# Patient Record
Sex: FEMALE | Race: WHITE | NOT HISPANIC OR LATINO | Employment: OTHER | URBAN - METROPOLITAN AREA
[De-identification: names, ages, dates, MRNs, and addresses within clinical notes are randomized per-mention and may not be internally consistent; named-entity substitution may affect disease eponyms.]

---

## 2017-02-03 ENCOUNTER — HOSPITAL ENCOUNTER (OUTPATIENT)
Dept: GENERAL RADIOLOGY | Facility: HOSPITAL | Age: 82
Discharge: HOME | End: 2017-02-03

## 2017-02-03 ENCOUNTER — APPOINTMENT (OUTPATIENT)
Dept: PREADMISSION TESTING | Facility: HOSPITAL | Age: 82
End: 2017-02-03

## 2017-02-03 ENCOUNTER — HOSPITAL ENCOUNTER (OUTPATIENT)
Dept: GENERAL RADIOLOGY | Facility: HOSPITAL | Age: 82
Discharge: HOME | End: 2017-02-03
Admitting: ORTHOPAEDIC SURGERY

## 2017-02-03 VITALS
OXYGEN SATURATION: 98 % | HEIGHT: 64 IN | WEIGHT: 124.9 LBS | DIASTOLIC BLOOD PRESSURE: 76 MMHG | RESPIRATION RATE: 16 BRPM | SYSTOLIC BLOOD PRESSURE: 184 MMHG | BODY MASS INDEX: 21.32 KG/M2 | TEMPERATURE: 96.7 F | HEART RATE: 61 BPM

## 2017-02-03 LAB
ANION GAP SERPL CALCULATED.3IONS-SCNC: 13.3 MMOL/L
BACTERIA UR QL AUTO: ABNORMAL /HPF
BILIRUB UR QL STRIP: NEGATIVE
BUN BLD-MCNC: 24 MG/DL (ref 8–23)
BUN/CREAT SERPL: 26.7 (ref 7–25)
CALCIUM SPEC-SCNC: 9.2 MG/DL (ref 8.6–10.5)
CHLORIDE SERPL-SCNC: 97 MMOL/L (ref 98–107)
CLARITY UR: CLEAR
CO2 SERPL-SCNC: 29.7 MMOL/L (ref 22–29)
COLOR UR: YELLOW
CREAT BLD-MCNC: 0.9 MG/DL (ref 0.57–1)
DEPRECATED RDW RBC AUTO: 44.1 FL (ref 37–54)
ERYTHROCYTE [DISTWIDTH] IN BLOOD BY AUTOMATED COUNT: 13.5 % (ref 11.7–13)
GFR SERPL CREATININE-BSD FRML MDRD: 60 ML/MIN/1.73
GLUCOSE BLD-MCNC: 86 MG/DL (ref 65–99)
GLUCOSE UR STRIP-MCNC: NEGATIVE MG/DL
HCT VFR BLD AUTO: 37.7 % (ref 35.6–45.5)
HGB BLD-MCNC: 12.8 G/DL (ref 11.9–15.5)
HGB UR QL STRIP.AUTO: NEGATIVE
HYALINE CASTS UR QL AUTO: ABNORMAL /LPF
KETONES UR QL STRIP: NEGATIVE
LEUKOCYTE ESTERASE UR QL STRIP.AUTO: NEGATIVE
MCH RBC QN AUTO: 30.3 PG (ref 26.9–32)
MCHC RBC AUTO-ENTMCNC: 34 G/DL (ref 32.4–36.3)
MCV RBC AUTO: 89.1 FL (ref 80.5–98.2)
NITRITE UR QL STRIP: NEGATIVE
PH UR STRIP.AUTO: 6.5 [PH] (ref 5–8)
PLATELET # BLD AUTO: 239 10*3/MM3 (ref 140–500)
PMV BLD AUTO: 10 FL (ref 6–12)
POTASSIUM BLD-SCNC: 4 MMOL/L (ref 3.5–5.2)
PROT UR QL STRIP: NEGATIVE
RBC # BLD AUTO: 4.23 10*6/MM3 (ref 3.9–5.2)
RBC # UR: ABNORMAL /HPF
REF LAB TEST METHOD: ABNORMAL
SODIUM BLD-SCNC: 140 MMOL/L (ref 136–145)
SP GR UR STRIP: 1.02 (ref 1–1.03)
SQUAMOUS #/AREA URNS HPF: ABNORMAL /HPF
UROBILINOGEN UR QL STRIP: NORMAL
WBC NRBC COR # BLD: 9.4 10*3/MM3 (ref 4.5–10.7)
WBC UR QL AUTO: ABNORMAL /HPF

## 2017-02-03 PROCEDURE — 36415 COLL VENOUS BLD VENIPUNCTURE: CPT

## 2017-02-03 PROCEDURE — 81001 URINALYSIS AUTO W/SCOPE: CPT | Performed by: ORTHOPAEDIC SURGERY

## 2017-02-03 PROCEDURE — 87086 URINE CULTURE/COLONY COUNT: CPT | Performed by: ORTHOPAEDIC SURGERY

## 2017-02-03 PROCEDURE — 80048 BASIC METABOLIC PNL TOTAL CA: CPT | Performed by: ORTHOPAEDIC SURGERY

## 2017-02-03 PROCEDURE — 85027 COMPLETE CBC AUTOMATED: CPT | Performed by: ORTHOPAEDIC SURGERY

## 2017-02-03 PROCEDURE — 93005 ELECTROCARDIOGRAM TRACING: CPT

## 2017-02-03 PROCEDURE — 73562 X-RAY EXAM OF KNEE 3: CPT

## 2017-02-03 PROCEDURE — 93010 ELECTROCARDIOGRAM REPORT: CPT | Performed by: INTERNAL MEDICINE

## 2017-02-03 PROCEDURE — 71020 HC CHEST PA AND LATERAL: CPT

## 2017-02-03 RX ORDER — PRAMIPEXOLE DIHYDROCHLORIDE 0.5 MG/1
0.5 TABLET ORAL 3 TIMES DAILY
Status: ON HOLD | COMMUNITY
End: 2017-02-15 | Stop reason: ALTCHOICE

## 2017-02-03 RX ORDER — MELOXICAM 15 MG/1
15 TABLET ORAL DAILY
COMMUNITY
End: 2020-05-13 | Stop reason: HOSPADM

## 2017-02-03 RX ORDER — TRAMADOL HYDROCHLORIDE 50 MG/1
50 TABLET ORAL EVERY 6 HOURS PRN
Status: ON HOLD | COMMUNITY
End: 2017-02-20

## 2017-02-03 RX ORDER — ASPIRIN 81 MG/1
81 TABLET ORAL DAILY
COMMUNITY
End: 2017-02-20 | Stop reason: HOSPADM

## 2017-02-03 RX ORDER — BISOPROLOL FUMARATE AND HYDROCHLOROTHIAZIDE 5; 6.25 MG/1; MG/1
1 TABLET ORAL EVERY MORNING
COMMUNITY
End: 2020-05-13 | Stop reason: HOSPADM

## 2017-02-03 RX ORDER — CHLORHEXIDINE GLUCONATE 500 MG/1
CLOTH TOPICAL
COMMUNITY
End: 2017-02-20 | Stop reason: HOSPADM

## 2017-02-03 NOTE — DISCHARGE INSTRUCTIONS
Take the following medications the morning of surgery with a small sip of water.      BISOPROLOL. AND DO YOUR NEBULIZER TREATMENT.      OFFICE TO CALL WITH ARRIVAL TIME.    General Instructions:  • Do not eat or drink after midnight: includes water, mints, or gum. You may brush your teeth.  • Do not smoke, chew tobacco, or drink alcohol.  • Bring medications in original bottles, any inhalers and if applicable your C-PAP/ BI-PAP machine.  • Bring any papers given to you in the doctor’s office.  • Wear clean comfortable clothes and socks.  • Do not wear contact lenses or make-up.  Bring a case for your glasses if applicable.   • Bring crutches or walker if applicable.  • Leave all other valuables and jewelry at home.    If you were given a blood bank ID arm band remember to bring it with you the day of surgery.    Preventing a Surgical Site Infection:  Shower on the morning of surgery using a fresh bar of anti-bacterial soap (such as Dial) and clean washcloth.  Dry with a clean towel and dress in clean clothing.  For 2 to 3 days before surgery, avoid shaving with a razor near where you will have surgery because the razor can irritate skin and make it easier to develop an infection  Ask your surgeon if you will be receiving antibiotics prior to surgery  Make sure you, your family, and all healthcare providers clean their hands with soap and water or an alcohol based hand  before caring for you or your wound  If at all possible, quit smoking as many days before surgery as you can.    Day of surgery:  Upon arrival, a Pre-op nurse and Anesthesiologist will review your health history, obtain vital signs, and answer questions you may have.  The only belongings needed at this time will be your home medications and if applicable your C-PAP/BI-PAP machine.  If you are staying overnight your family can leave the rest of your belongings in the car and bring them to your room later.  A Pre-op nurse will start an IV and  you may receive medication in preparation for surgery, including something to help you relax.  Your family will be able to see you in the Pre-op area.  While you are in surgery your family should notify the waiting room  if they leave the waiting room area and provide a contact phone number.    Please be aware that surgery does come with discomfort.  We want to make every effort to control your discomfort so please discuss any uncontrolled symptoms with your nurse.   Your doctor will most likely have prescribed pain medications.      If you are going home after surgery you will receive individualized written care instructions before being discharged.  A responsible adult must drive you to and from the hospital on the day of your surgery and stay with you for 24 hours.    If you are staying overnight following surgery, you will be transported to your hospital room following the recovery period.  [facility] has all private rooms.    If you have any questions please call Pre-Admission Testing at [phone].  Deductibles and co-payments are collected on the day of service. Please be prepared to pay the required co-pay, deductible or deposit on the day of service as defined by your plan.    2% CHLORAHEXIDINE GLUCONATE* CLOTH  Preparing or “prepping” skin before surgery can reduce the risk of infection at the surgical site. To make the process easier, [facility] has chosen disposable cloths moistened with a rinse-free, 2% Chlorhexidine Gluconate (CHG) antiseptic solution. The steps below outline the prepping process and should be carefully followed.        Use the prep cloth on the area that is circled in the diagram             Directions Night before Surgery  1) Shower using a fresh bar of anti-bacterial soap (such as Dial) and clean washcloth.  Use a clean towel to completely dry your skin.  2) Do not use any lotions, oils or creams on your skin.  3) Open the package and remove 1  cloth, wipe your skin for 30 seconds in a circular motion.  Allow to dry for 3 minutes.  4) Repeat #3 with second cloth.  5) Do not touch your eyes, ears, or mouth with the prep cloth.  6) Allow the wet prep solution to air dry.  7) Discard the prep cloth and wash your hands with soap and water.   8) Dress in clean bed clothes and sleep on fresh clean bed sheets.   9) You may experience some temporary itching after the prep.    Directions Day of Surgery  1) Repeat steps 1,2,3,4,5,6,7, and 9.   2) Dress in clean clothes before coming to the hospital.    BACTROBAN NASAL OINTMENT  There are many germs normally in your nose. Bactroban is an ointment that will help reduce these germs. Please follow these instructions for Bactroban use:    ____Two days before surgery in the evening Date________    ____The day before surgery in the morning  Date________    ____The day before surgery in the evening              Date________    ____The day of surgery in the morning    Date________    **Squirt ½ package of Bactroban Ointment onto a cotton applicator and apply to inside of 1st nostril.  Squirt the remaining Bactroban and apply to the inside of the other nostril.    PERIDEX- ORAL:  Use only if your surgeon has ordered  Use the night before and morning of surgery - Swish, gargle, and spit - do not swallow.

## 2017-02-05 LAB — BACTERIA SPEC AEROBE CULT: ABNORMAL

## 2017-02-14 RX ORDER — CEFAZOLIN SODIUM 2 G/100ML
2 INJECTION, SOLUTION INTRAVENOUS ONCE
Status: CANCELLED | OUTPATIENT
Start: 2017-02-14 | End: 2017-02-14

## 2017-02-15 ENCOUNTER — APPOINTMENT (OUTPATIENT)
Dept: GENERAL RADIOLOGY | Facility: HOSPITAL | Age: 82
End: 2017-02-15

## 2017-02-15 ENCOUNTER — ANESTHESIA (OUTPATIENT)
Dept: PERIOP | Facility: HOSPITAL | Age: 82
End: 2017-02-15

## 2017-02-15 ENCOUNTER — ANESTHESIA EVENT (OUTPATIENT)
Dept: PERIOP | Facility: HOSPITAL | Age: 82
End: 2017-02-15

## 2017-02-15 ENCOUNTER — HOSPITAL ENCOUNTER (INPATIENT)
Facility: HOSPITAL | Age: 82
LOS: 5 days | Discharge: NURSING, REHAB OR LONG-TERM CARE | End: 2017-02-20
Attending: ORTHOPAEDIC SURGERY | Admitting: ORTHOPAEDIC SURGERY

## 2017-02-15 DIAGNOSIS — R26.2 DIFFICULTY WALKING: Primary | ICD-10-CM

## 2017-02-15 PROBLEM — M17.12 OSTEOARTHRITIS OF LEFT KNEE: Status: ACTIVE | Noted: 2017-02-15

## 2017-02-15 LAB
HCT VFR BLD AUTO: 36 % (ref 35.6–45.5)
HGB BLD-MCNC: 12 G/DL (ref 11.9–15.5)

## 2017-02-15 PROCEDURE — C1776 JOINT DEVICE (IMPLANTABLE): HCPCS | Performed by: ORTHOPAEDIC SURGERY

## 2017-02-15 PROCEDURE — 0SRD0JA REPLACEMENT OF LEFT KNEE JOINT WITH SYNTHETIC SUBSTITUTE, UNCEMENTED, OPEN APPROACH: ICD-10-PCS | Performed by: ORTHOPAEDIC SURGERY

## 2017-02-15 PROCEDURE — 25010000002 VANCOMYCIN PER 500 MG: Performed by: ORTHOPAEDIC SURGERY

## 2017-02-15 PROCEDURE — 85018 HEMOGLOBIN: CPT | Performed by: ORTHOPAEDIC SURGERY

## 2017-02-15 PROCEDURE — 73560 X-RAY EXAM OF KNEE 1 OR 2: CPT

## 2017-02-15 PROCEDURE — 25010000003 CEFAZOLIN IN DEXTROSE 2-4 GM/100ML-% SOLUTION: Performed by: ORTHOPAEDIC SURGERY

## 2017-02-15 PROCEDURE — 25010000002 POTASSIUM CHLORIDE PER 2 MEQ OF POTASSIUM: Performed by: ORTHOPAEDIC SURGERY

## 2017-02-15 PROCEDURE — 25010000002 FENTANYL CITRATE (PF) 100 MCG/2ML SOLUTION: Performed by: NURSE ANESTHETIST, CERTIFIED REGISTERED

## 2017-02-15 PROCEDURE — 85014 HEMATOCRIT: CPT | Performed by: ORTHOPAEDIC SURGERY

## 2017-02-15 PROCEDURE — C1713 ANCHOR/SCREW BN/BN,TIS/BN: HCPCS | Performed by: ORTHOPAEDIC SURGERY

## 2017-02-15 PROCEDURE — 25010000002 ONDANSETRON PER 1 MG: Performed by: NURSE ANESTHETIST, CERTIFIED REGISTERED

## 2017-02-15 PROCEDURE — 25010000002 MIDAZOLAM PER 1 MG: Performed by: ANESTHESIOLOGY

## 2017-02-15 PROCEDURE — 94799 UNLISTED PULMONARY SVC/PX: CPT

## 2017-02-15 PROCEDURE — 25010000002 PROPOFOL 10 MG/ML EMULSION: Performed by: NURSE ANESTHETIST, CERTIFIED REGISTERED

## 2017-02-15 PROCEDURE — 25010000002 DEXAMETHASONE PER 1 MG: Performed by: NURSE ANESTHETIST, CERTIFIED REGISTERED

## 2017-02-15 DEVICE — SCRW HEX PERSONA 3.5X3.2X33MM: Type: IMPLANTABLE DEVICE | Status: FUNCTIONAL

## 2017-02-15 DEVICE — IMPLANTABLE DEVICE: Type: IMPLANTABLE DEVICE | Site: KNEE | Status: FUNCTIONAL

## 2017-02-15 DEVICE — EXT STEM FEM/KN NEXGEN STR 15X75MM 30MM: Type: IMPLANTABLE DEVICE | Site: KNEE | Status: FUNCTIONAL

## 2017-02-15 DEVICE — CMT BONE PALACOS 120001: Type: IMPLANTABLE DEVICE | Site: KNEE | Status: FUNCTIONAL

## 2017-02-15 RX ORDER — OXYCODONE AND ACETAMINOPHEN 10; 325 MG/1; MG/1
1 TABLET ORAL
Status: DISCONTINUED | OUTPATIENT
Start: 2017-02-15 | End: 2017-02-16

## 2017-02-15 RX ORDER — ONDANSETRON 4 MG/1
4 TABLET, ORALLY DISINTEGRATING ORAL EVERY 6 HOURS PRN
Status: DISCONTINUED | OUTPATIENT
Start: 2017-02-15 | End: 2017-02-20 | Stop reason: HOSPADM

## 2017-02-15 RX ORDER — CEFAZOLIN SODIUM 2 G/100ML
2 INJECTION, SOLUTION INTRAVENOUS ONCE
Status: COMPLETED | OUTPATIENT
Start: 2017-02-15 | End: 2017-02-15

## 2017-02-15 RX ORDER — FENTANYL CITRATE 50 UG/ML
50 INJECTION, SOLUTION INTRAMUSCULAR; INTRAVENOUS
Status: DISCONTINUED | OUTPATIENT
Start: 2017-02-15 | End: 2017-02-15 | Stop reason: HOSPADM

## 2017-02-15 RX ORDER — DIPHENHYDRAMINE HCL 25 MG
25 CAPSULE ORAL EVERY 4 HOURS PRN
Status: DISCONTINUED | OUTPATIENT
Start: 2017-02-15 | End: 2017-02-20 | Stop reason: HOSPADM

## 2017-02-15 RX ORDER — BISOPROLOL FUMARATE AND HYDROCHLOROTHIAZIDE 5; 6.25 MG/1; MG/1
1 TABLET ORAL EVERY MORNING
Status: DISCONTINUED | OUTPATIENT
Start: 2017-02-16 | End: 2017-02-20 | Stop reason: HOSPADM

## 2017-02-15 RX ORDER — DIPHENHYDRAMINE HYDROCHLORIDE 50 MG/ML
12.5 INJECTION INTRAMUSCULAR; INTRAVENOUS
Status: DISCONTINUED | OUTPATIENT
Start: 2017-02-15 | End: 2017-02-15 | Stop reason: HOSPADM

## 2017-02-15 RX ORDER — SODIUM CHLORIDE 0.9 % (FLUSH) 0.9 %
1-10 SYRINGE (ML) INJECTION AS NEEDED
Status: DISCONTINUED | OUTPATIENT
Start: 2017-02-15 | End: 2017-02-15 | Stop reason: HOSPADM

## 2017-02-15 RX ORDER — ONDANSETRON 2 MG/ML
4 INJECTION INTRAMUSCULAR; INTRAVENOUS ONCE AS NEEDED
Status: DISCONTINUED | OUTPATIENT
Start: 2017-02-15 | End: 2017-02-15 | Stop reason: HOSPADM

## 2017-02-15 RX ORDER — SENNA AND DOCUSATE SODIUM 50; 8.6 MG/1; MG/1
2 TABLET, FILM COATED ORAL 2 TIMES DAILY
Status: DISCONTINUED | OUTPATIENT
Start: 2017-02-15 | End: 2017-02-20 | Stop reason: HOSPADM

## 2017-02-15 RX ORDER — PROMETHAZINE HYDROCHLORIDE 25 MG/ML
12.5 INJECTION, SOLUTION INTRAMUSCULAR; INTRAVENOUS EVERY 6 HOURS PRN
Status: DISCONTINUED | OUTPATIENT
Start: 2017-02-15 | End: 2017-02-16

## 2017-02-15 RX ORDER — CEFAZOLIN SODIUM 2 G/100ML
2 INJECTION, SOLUTION INTRAVENOUS EVERY 8 HOURS
Status: COMPLETED | OUTPATIENT
Start: 2017-02-15 | End: 2017-02-16

## 2017-02-15 RX ORDER — NALOXONE HCL 0.4 MG/ML
0.1 VIAL (ML) INJECTION
Status: DISCONTINUED | OUTPATIENT
Start: 2017-02-15 | End: 2017-02-20 | Stop reason: HOSPADM

## 2017-02-15 RX ORDER — LIDOCAINE HYDROCHLORIDE 20 MG/ML
INJECTION, SOLUTION INFILTRATION; PERINEURAL AS NEEDED
Status: DISCONTINUED | OUTPATIENT
Start: 2017-02-15 | End: 2017-02-15 | Stop reason: SURG

## 2017-02-15 RX ORDER — SODIUM CHLORIDE AND POTASSIUM CHLORIDE 150; 450 MG/100ML; MG/100ML
100 INJECTION, SOLUTION INTRAVENOUS CONTINUOUS
Status: DISCONTINUED | OUTPATIENT
Start: 2017-02-15 | End: 2017-02-15 | Stop reason: SDUPTHER

## 2017-02-15 RX ORDER — BISACODYL 10 MG
10 SUPPOSITORY, RECTAL RECTAL DAILY PRN
Status: DISCONTINUED | OUTPATIENT
Start: 2017-02-15 | End: 2017-02-20 | Stop reason: HOSPADM

## 2017-02-15 RX ORDER — FAMOTIDINE 10 MG/ML
20 INJECTION, SOLUTION INTRAVENOUS ONCE
Status: COMPLETED | OUTPATIENT
Start: 2017-02-15 | End: 2017-02-15

## 2017-02-15 RX ORDER — NALOXONE HCL 0.4 MG/ML
0.2 VIAL (ML) INJECTION AS NEEDED
Status: DISCONTINUED | OUTPATIENT
Start: 2017-02-15 | End: 2017-02-15 | Stop reason: HOSPADM

## 2017-02-15 RX ORDER — ONDANSETRON 4 MG/1
4 TABLET, FILM COATED ORAL EVERY 6 HOURS PRN
Status: DISCONTINUED | OUTPATIENT
Start: 2017-02-15 | End: 2017-02-20 | Stop reason: HOSPADM

## 2017-02-15 RX ORDER — VANCOMYCIN HYDROCHLORIDE 1 G/200ML
1000 INJECTION, SOLUTION INTRAVENOUS ONCE
Status: COMPLETED | OUTPATIENT
Start: 2017-02-15 | End: 2017-02-15

## 2017-02-15 RX ORDER — FENTANYL CITRATE 50 UG/ML
INJECTION, SOLUTION INTRAMUSCULAR; INTRAVENOUS AS NEEDED
Status: DISCONTINUED | OUTPATIENT
Start: 2017-02-15 | End: 2017-02-15 | Stop reason: SURG

## 2017-02-15 RX ORDER — HYDROCODONE BITARTRATE AND ACETAMINOPHEN 7.5; 325 MG/1; MG/1
1 TABLET ORAL ONCE AS NEEDED
Status: DISCONTINUED | OUTPATIENT
Start: 2017-02-15 | End: 2017-02-15 | Stop reason: HOSPADM

## 2017-02-15 RX ORDER — GLYCOPYRROLATE 0.2 MG/ML
INJECTION INTRAMUSCULAR; INTRAVENOUS AS NEEDED
Status: DISCONTINUED | OUTPATIENT
Start: 2017-02-15 | End: 2017-02-15 | Stop reason: SURG

## 2017-02-15 RX ORDER — MAGNESIUM HYDROXIDE 1200 MG/15ML
LIQUID ORAL AS NEEDED
Status: DISCONTINUED | OUTPATIENT
Start: 2017-02-15 | End: 2017-02-15 | Stop reason: HOSPADM

## 2017-02-15 RX ORDER — DEXAMETHASONE SODIUM PHOSPHATE 10 MG/ML
INJECTION INTRAMUSCULAR; INTRAVENOUS AS NEEDED
Status: DISCONTINUED | OUTPATIENT
Start: 2017-02-15 | End: 2017-02-15 | Stop reason: SURG

## 2017-02-15 RX ORDER — PROPOFOL 10 MG/ML
VIAL (ML) INTRAVENOUS AS NEEDED
Status: DISCONTINUED | OUTPATIENT
Start: 2017-02-15 | End: 2017-02-15 | Stop reason: SURG

## 2017-02-15 RX ORDER — ONDANSETRON 2 MG/ML
INJECTION INTRAMUSCULAR; INTRAVENOUS AS NEEDED
Status: DISCONTINUED | OUTPATIENT
Start: 2017-02-15 | End: 2017-02-15 | Stop reason: SURG

## 2017-02-15 RX ORDER — HYDRALAZINE HYDROCHLORIDE 20 MG/ML
5 INJECTION INTRAMUSCULAR; INTRAVENOUS
Status: DISCONTINUED | OUTPATIENT
Start: 2017-02-15 | End: 2017-02-15 | Stop reason: HOSPADM

## 2017-02-15 RX ORDER — TRANEXAMIC ACID 100 MG/ML
INJECTION, SOLUTION INTRAVENOUS AS NEEDED
Status: DISCONTINUED | OUTPATIENT
Start: 2017-02-15 | End: 2017-02-15 | Stop reason: SURG

## 2017-02-15 RX ORDER — ONDANSETRON 2 MG/ML
4 INJECTION INTRAMUSCULAR; INTRAVENOUS EVERY 6 HOURS PRN
Status: DISCONTINUED | OUTPATIENT
Start: 2017-02-15 | End: 2017-02-20 | Stop reason: HOSPADM

## 2017-02-15 RX ORDER — LABETALOL HYDROCHLORIDE 5 MG/ML
5 INJECTION, SOLUTION INTRAVENOUS
Status: DISCONTINUED | OUTPATIENT
Start: 2017-02-15 | End: 2017-02-15 | Stop reason: HOSPADM

## 2017-02-15 RX ORDER — ACETAMINOPHEN 325 MG/1
325 TABLET ORAL EVERY 4 HOURS PRN
Status: DISCONTINUED | OUTPATIENT
Start: 2017-02-15 | End: 2017-02-20 | Stop reason: HOSPADM

## 2017-02-15 RX ORDER — OXYCODONE AND ACETAMINOPHEN 10; 325 MG/1; MG/1
2 TABLET ORAL EVERY 4 HOURS PRN
Status: DISCONTINUED | OUTPATIENT
Start: 2017-02-15 | End: 2017-02-16

## 2017-02-15 RX ORDER — MIDAZOLAM HYDROCHLORIDE 1 MG/ML
1 INJECTION INTRAMUSCULAR; INTRAVENOUS
Status: DISCONTINUED | OUTPATIENT
Start: 2017-02-15 | End: 2017-02-15 | Stop reason: HOSPADM

## 2017-02-15 RX ORDER — ACETAMINOPHEN 325 MG/1
650 TABLET ORAL EVERY 4 HOURS PRN
Status: DISCONTINUED | OUTPATIENT
Start: 2017-02-15 | End: 2017-02-20 | Stop reason: HOSPADM

## 2017-02-15 RX ORDER — DIAZEPAM 5 MG/1
5 TABLET ORAL EVERY 6 HOURS PRN
Status: DISCONTINUED | OUTPATIENT
Start: 2017-02-15 | End: 2017-02-20 | Stop reason: HOSPADM

## 2017-02-15 RX ORDER — MIDAZOLAM HYDROCHLORIDE 1 MG/ML
2 INJECTION INTRAMUSCULAR; INTRAVENOUS
Status: DISCONTINUED | OUTPATIENT
Start: 2017-02-15 | End: 2017-02-15 | Stop reason: HOSPADM

## 2017-02-15 RX ORDER — OXYCODONE AND ACETAMINOPHEN 7.5; 325 MG/1; MG/1
1 TABLET ORAL ONCE AS NEEDED
Status: DISCONTINUED | OUTPATIENT
Start: 2017-02-15 | End: 2017-02-15 | Stop reason: HOSPADM

## 2017-02-15 RX ORDER — HYDROMORPHONE HYDROCHLORIDE 1 MG/ML
0.25 INJECTION, SOLUTION INTRAMUSCULAR; INTRAVENOUS; SUBCUTANEOUS
Status: DISCONTINUED | OUTPATIENT
Start: 2017-02-15 | End: 2017-02-15 | Stop reason: HOSPADM

## 2017-02-15 RX ORDER — FLUMAZENIL 0.1 MG/ML
0.2 INJECTION INTRAVENOUS AS NEEDED
Status: DISCONTINUED | OUTPATIENT
Start: 2017-02-15 | End: 2017-02-15 | Stop reason: HOSPADM

## 2017-02-15 RX ORDER — HYDROMORPHONE HYDROCHLORIDE 1 MG/ML
0.5 INJECTION, SOLUTION INTRAMUSCULAR; INTRAVENOUS; SUBCUTANEOUS
Status: DISCONTINUED | OUTPATIENT
Start: 2017-02-15 | End: 2017-02-15 | Stop reason: HOSPADM

## 2017-02-15 RX ORDER — PROMETHAZINE HYDROCHLORIDE 25 MG/1
12.5 TABLET ORAL ONCE AS NEEDED
Status: DISCONTINUED | OUTPATIENT
Start: 2017-02-15 | End: 2017-02-15 | Stop reason: HOSPADM

## 2017-02-15 RX ORDER — HYDROMORPHONE HYDROCHLORIDE 1 MG/ML
0.5 INJECTION, SOLUTION INTRAMUSCULAR; INTRAVENOUS; SUBCUTANEOUS
Status: DISCONTINUED | OUTPATIENT
Start: 2017-02-15 | End: 2017-02-16

## 2017-02-15 RX ORDER — SODIUM CHLORIDE, SODIUM LACTATE, POTASSIUM CHLORIDE, CALCIUM CHLORIDE 600; 310; 30; 20 MG/100ML; MG/100ML; MG/100ML; MG/100ML
9 INJECTION, SOLUTION INTRAVENOUS CONTINUOUS
Status: DISCONTINUED | OUTPATIENT
Start: 2017-02-15 | End: 2017-02-15 | Stop reason: HOSPADM

## 2017-02-15 RX ADMIN — VANCOMYCIN HYDROCHLORIDE 1000 MG: 1 INJECTION, SOLUTION INTRAVENOUS at 10:56

## 2017-02-15 RX ADMIN — GLYCOPYRROLATE 0.2 MG: 0.2 INJECTION INTRAMUSCULAR; INTRAVENOUS at 12:22

## 2017-02-15 RX ADMIN — POTASSIUM CHLORIDE 100 ML/HR: 2 INJECTION, SOLUTION, CONCENTRATE INTRAVENOUS at 21:45

## 2017-02-15 RX ADMIN — SODIUM CHLORIDE, POTASSIUM CHLORIDE, SODIUM LACTATE AND CALCIUM CHLORIDE: 600; 310; 30; 20 INJECTION, SOLUTION INTRAVENOUS at 11:55

## 2017-02-15 RX ADMIN — ONDANSETRON 4 MG: 2 INJECTION INTRAMUSCULAR; INTRAVENOUS at 13:32

## 2017-02-15 RX ADMIN — CEFAZOLIN SODIUM 2 G: 2 INJECTION, SOLUTION INTRAVENOUS at 11:56

## 2017-02-15 RX ADMIN — OXYCODONE HYDROCHLORIDE AND ACETAMINOPHEN 1 TABLET: 10; 325 TABLET ORAL at 20:09

## 2017-02-15 RX ADMIN — FENTANYL CITRATE 50 MCG: 50 INJECTION INTRAMUSCULAR; INTRAVENOUS at 15:54

## 2017-02-15 RX ADMIN — FAMOTIDINE 20 MG: 10 INJECTION, SOLUTION INTRAVENOUS at 11:43

## 2017-02-15 RX ADMIN — TRANEXAMIC ACID 1000 MG: 100 INJECTION, SOLUTION INTRAVENOUS at 12:23

## 2017-02-15 RX ADMIN — PROPOFOL 100 MG: 10 INJECTION, EMULSION INTRAVENOUS at 12:04

## 2017-02-15 RX ADMIN — LIDOCAINE HYDROCHLORIDE 60 MG: 20 INJECTION, SOLUTION INFILTRATION; PERINEURAL at 12:02

## 2017-02-15 RX ADMIN — MIDAZOLAM 0.5 MG: 1 INJECTION INTRAMUSCULAR; INTRAVENOUS at 11:43

## 2017-02-15 RX ADMIN — CARBIDOPA AND LEVODOPA 1.5 TABLET: 25; 100 TABLET ORAL at 20:09

## 2017-02-15 RX ADMIN — FENTANYL CITRATE 50 MCG: 50 INJECTION INTRAMUSCULAR; INTRAVENOUS at 12:36

## 2017-02-15 RX ADMIN — SODIUM CHLORIDE, POTASSIUM CHLORIDE, SODIUM LACTATE AND CALCIUM CHLORIDE: 600; 310; 30; 20 INJECTION, SOLUTION INTRAVENOUS at 12:36

## 2017-02-15 RX ADMIN — CEFAZOLIN SODIUM 2 G: 2 INJECTION, SOLUTION INTRAVENOUS at 20:10

## 2017-02-15 RX ADMIN — DOCUSATE SODIUM -SENNOSIDES 2 TABLET: 50; 8.6 TABLET, COATED ORAL at 20:10

## 2017-02-15 RX ADMIN — DEXAMETHASONE SODIUM PHOSPHATE 8 MG: 10 INJECTION INTRAMUSCULAR; INTRAVENOUS at 13:32

## 2017-02-15 RX ADMIN — FENTANYL CITRATE 50 MCG: 50 INJECTION INTRAMUSCULAR; INTRAVENOUS at 12:28

## 2017-02-15 RX ADMIN — FENTANYL CITRATE 50 MCG: 50 INJECTION INTRAMUSCULAR; INTRAVENOUS at 13:28

## 2017-02-15 RX ADMIN — SODIUM CHLORIDE, POTASSIUM CHLORIDE, SODIUM LACTATE AND CALCIUM CHLORIDE 9 ML/HR: 600; 310; 30; 20 INJECTION, SOLUTION INTRAVENOUS at 11:45

## 2017-02-15 RX ADMIN — EPHEDRINE SULFATE 10 MG: 50 INJECTION INTRAMUSCULAR; INTRAVENOUS; SUBCUTANEOUS at 14:24

## 2017-02-15 RX ADMIN — PROPOFOL 100 MG: 10 INJECTION, EMULSION INTRAVENOUS at 12:02

## 2017-02-15 NOTE — BRIEF OP NOTE
TOTAL KNEE ARTHROPLASTY  Procedure Note    Gregoria Jimenez  2/15/2017    Pre-op Diagnosis:   Avascular necrosis with subchondral fracture of the left distal femur and proximal tibia    Post-op Diagnosis:     Same  Procedure/CPT® Codes:      Procedure(s):  LT TOTAL KNEE ARTHROPLASTY    Surgeon(s):  Gregory Carvajal MD    Anesthesia: General    Staff:   Circulator: Rochelle Gonsalez RN; Janae Diaz RN; Angeline Alcantara RN  Scrub Person: Dia Dorman; Padma Hooks  Vendor Representative: Jeet Rahman  Assistant: Richy Blanton    Estimated Blood Loss: * No values recorded between 2/15/2017 11:56 AM and 2/15/2017  2:33 PM *    Specimens:                * No specimens in log *      Drains:           Findings: see dictation    Complications: none      Gregory Carvajal MD     Date: 2/15/2017  Time: 2:33 PM

## 2017-02-15 NOTE — ANESTHESIA PREPROCEDURE EVALUATION
Anesthesia Evaluation        Airway   Mallampati: II  TM distance: >3 FB  Neck ROM: full  no difficulty expected  Dental      Comment: 3 upper caps front    Pulmonary - normal exam   Cardiovascular - normal exam    (+) hypertension well controlled,       Neuro/Psych  (+) tremors,      ROS Comment: parkinsons disease diagnosed 3 years ago  Tremor for 1-2 years prior  GI/Hepatic/Renal/Endo      Musculoskeletal     Abdominal    Substance History      OB/GYN          Other   (+) arthritis                                 Anesthesia Plan    ASA 3     general     intravenous induction   Anesthetic plan and risks discussed with patient.

## 2017-02-15 NOTE — PERIOPERATIVE NURSING NOTE
PT LIVES IN ASSISTED LIVING. ALERT, ORIENTED X 3. ABLE TO GIVE COMPLETE HX. PATIENT HAS PARKINSON'S. VERY TREMULOUS BOTH EXTREMITIES.

## 2017-02-15 NOTE — ANESTHESIA POSTPROCEDURE EVALUATION
Patient: Gregoria Jimenez    Procedure Summary     Date Anesthesia Start Anesthesia Stop Room / Location    02/15/17 [id number]  [facility] / [facility]       Procedure Diagnosis Surgeon Provider    LT TOTAL KNEE ARTHROPLASTY (Left Knee) No diagnosis on file. MD Richy Hayden MD          Anesthesia Type: general  Last vitals  BP (!) 183/83 (02/15/17 1550)    Temp      Pulse 64 (02/15/17 1550)   Resp 16 (02/15/17 1550)    SpO2 99 % (02/15/17 1550)      Post Anesthesia Care and Evaluation    Patient location during evaluation: PACU  Patient participation: complete - patient participated  Level of consciousness: awake and alert  Pain management: adequate  Airway patency: patent  Anesthetic complications: No anesthetic complications    Cardiovascular status: acceptable  Respiratory status: acceptable  Hydration status: acceptable

## 2017-02-15 NOTE — H&P
ORTHOPEDIC SURGERY PRE-OP HISTORY AND PHYSICAL      Patient: Gregoria Jimenez  Date of Admission: 2/15/2017  9:35 AM  YOB: 1934  Medical Record Number: [id number]  Attending Physician: Gregory Carvajal MD  Consulting Physician: Gregory Carvajal MD    CHIEF COMPLIANT: Left knee pain    HISTORY OF PRESENT ILLINESS: Patient is a 83 y.o. year old female presents to [facility] with above complaints.  The patient failed conservative treatment and patient requested surgical intervention.  Presents today for a left TKA.      ALLERGIES:   Allergies   Allergen Reactions   • Sulfa Antibiotics Nausea Only       HOME MEDICATIONS:  Prescriptions Prior to Admission   Medication Sig Dispense Refill Last Dose   • aspirin 81 MG EC tablet Take 81 mg by mouth Daily. TO STOP 1 WEEK BEFORE SURGERY   2/7/2017   • bisoprolol-hydrochlorothiazide (ZIAC) 5-6.25 MG per tablet Take 1 tablet by mouth Every Morning.   2/15/2017 at 0730   • carbidopa-levodopa (SINEMET)  MG per tablet Take 1.5 tablets by mouth 3 (Three) Times a Day.   2/15/2017 at 0730   • meloxicam (MOBIC) 15 MG tablet Take 15 mg by mouth Daily. TO STOP 1 WEEK BEFORE SURGERY   2/8/2017   • mupirocin (BACTROBAN) 2 % nasal ointment into each nostril. As directed pre op   2/15/2017 at 0730   • Nebulizer misc As Needed.   2/14/2017 at 1800   • traMADol (ULTRAM) 50 MG tablet Take 50 mg by mouth Every 6 (Six) Hours As Needed for moderate pain (4-6).   2/13/2017   • Chlorhexidine Gluconate Cloth 2 % pads Apply  topically. As directed pre op   2/15/2017 at 0600       Past Medical History   Diagnosis Date   • Arthritis    • History of bronchiectasis      2015   • Hypertension    • Neuropathy    • Parkinson disease      Past Surgical History   Procedure Laterality Date   • Eye surgery     • Knee arthroscopy       Social History     Occupational History   • Not on file.     Social History Main Topics   • Smoking status: Never Smoker   • Smokeless tobacco:  "Never Used   • Alcohol use No   • Drug use: No   • Sexual activity: Defer      Social History     Social History Narrative     History reviewed. No pertinent family history.    REVIEW OF SYSTEMS:    HEENT: Patient denies any headaches, vision changes, change in hearing, or tinnitus, Patient denies epistaxis, sinus pain, hoarseness, or dysphagia   Pulmonary: Patient denies any cough, congestion, acute change in SOA or wheezing.   Cardiovascular: Patient denies any change in chest pain, dyspnea, palpitations, weakness, intolerance of exercise, varicosities, change in murmur   Gastrointestinal:  Patient denies change in appetite, melena, change in bowel habits.   Genital/Urinary: Patient denies dysuria, change in color of urine, change in frequency of urination, pain with urgency, change in incontinence, retention.   Musculoskeletal: Patient denies complaints of acute changes in symptoms of other joints not mentioned above.   Neurological: Patient denies changes in dizziness, tremor, ataxia, or difficulty in speaking or changes in memory.   Endocrine system: Patient denies acute changes in tremors, palpitations, polyuria, polydipsia, polyphagia, diaphoresis, exophthalmos, or goiter.   Psychological: Patient denies thoughts/plans or harming self or other; denies acute changes in depression,  insomnia, night terrors, noble, disorientation.   Skin: Patient denies any bruising, rashes, discoloration, pruritus,or wounds not mentioned in history of present illness or chief complaint above.   Hematopoietic: Patient denies current bleeding, epistaxis, hematuria, or melena.    PHYSICAL EXAM:   Vitals:  Vitals:    02/15/17 1014   BP: 166/66   BP Location: Right arm   Patient Position: Lying   Pulse: 57   Resp: 16   Temp: 98 °F (36.7 °C)   TempSrc: Oral   SpO2: 99%   Weight: 123 lb 7 oz (56 kg)   Height: 64\" (162.6 cm)       General:  83 y.o. female who appears about stated age.    Alert, cooperative, in no acute distress         "               Head:    Normocephalic, without obvious abnormality, atraumatic   Eyes:            Lids and lashes normal, conjunctivae and sclerae normal, no         icterus, no pallor, corneas clear, PERRLA   Ears:    Ears appear intact with no abnormalities noted   Throat:   No oral lesions, no thrush, oral mucosa moist   Neck:   No adenopathy, supple, trachea midline, no JVD   Back:     Limited exam shows no severe kyphosis present,no visible           erythema, no excessive  tenderness to palpation.    Lungs:     Respirations regular, even and unlabored.     Heart:    Normal rate, Pulses palpable   Chest Wall:    No abnormalities observed.   Abdomen:     Normal bowel sounds, no masses, no organomegaly, soft              non-tender, non-distended, no guarding, no rebound                      tenderness   Rectal:     Deferred   Pulses:   Pulses palpable and equal bilaterally   Skin:   No bleeding, bruising or rash   Lymph nodes:   No palpable adenopathy   Extremities:     Left knee: skin intact.  Painful ROM.  NVI distally.  Ligaments stable.  Valgus malalignment.     DIAGNOSTIC TEST:  No visits with results within 2 Day(s) from this visit.  Latest known visit with results is:    Appointment on 02/03/2017   Component Date Value Ref Range Status   • Glucose 02/03/2017 86  65 - 99 mg/dL Final   • BUN 02/03/2017 24* 8 - 23 mg/dL Final   • Creatinine 02/03/2017 0.90  0.57 - 1.00 mg/dL Final   • Sodium 02/03/2017 140  136 - 145 mmol/L Final   • Potassium 02/03/2017 4.0  3.5 - 5.2 mmol/L Final   • Chloride 02/03/2017 97* 98 - 107 mmol/L Final   • CO2 02/03/2017 29.7* 22.0 - 29.0 mmol/L Final   • Calcium 02/03/2017 9.2  8.6 - 10.5 mg/dL Final   • eGFR Non African Amer 02/03/2017 60* >60 mL/min/1.73 Final   • BUN/Creatinine Ratio 02/03/2017 26.7* 7.0 - 25.0 Final   • Anion Gap 02/03/2017 13.3  mmol/L Final   • WBC 02/03/2017 9.40  4.50 - 10.70 10*3/mm3 Final   • RBC 02/03/2017 4.23  3.90 - 5.20 10*6/mm3 Final   •  Hemoglobin 02/03/2017 12.8  11.9 - 15.5 g/dL Final   • Hematocrit 02/03/2017 37.7  35.6 - 45.5 % Final   • MCV 02/03/2017 89.1  80.5 - 98.2 fL Final   • MCH 02/03/2017 30.3  26.9 - 32.0 pg Final   • MCHC 02/03/2017 34.0  32.4 - 36.3 g/dL Final   • RDW 02/03/2017 13.5* 11.7 - 13.0 % Final   • RDW-SD 02/03/2017 44.1  37.0 - 54.0 fl Final   • MPV 02/03/2017 10.0  6.0 - 12.0 fL Final   • Platelets 02/03/2017 239  140 - 500 10*3/mm3 Final   • Urine Culture 02/03/2017 <25,000 CFU/mL Gram Negative Bacilli*  Final    Call if further workup needed.     • Color, UA 02/03/2017 Yellow  Yellow, Straw Final   • Appearance, UA 02/03/2017 Clear  Clear Final   • pH, UA 02/03/2017 6.5  5.0 - 8.0 Final   • Specific Gravity, UA 02/03/2017 1.019  1.005 - 1.030 Final   • Glucose, UA 02/03/2017 Negative  Negative Final   • Ketones, UA 02/03/2017 Negative  Negative Final   • Bilirubin, UA 02/03/2017 Negative  Negative Final   • Blood, UA 02/03/2017 Negative  Negative Final   • Protein, UA 02/03/2017 Negative  Negative Final   • Leuk Esterase, UA 02/03/2017 Negative  Negative Final   • Nitrite, UA 02/03/2017 Negative  Negative Final   • Urobilinogen, UA 02/03/2017 1.0 E.U./dL  0.2 - 1.0 E.U./dL Final   • RBC, UA 02/03/2017 None Seen  None Seen /HPF Final   • WBC, UA 02/03/2017 0-2* None Seen /HPF Final   • Bacteria, UA 02/03/2017 None Seen  None Seen /HPF Final   • Squamous Epithelial Cells, UA 02/03/2017 None Seen  None Seen, 0-2 /HPF Final   • Hyaline Casts, UA 02/03/2017 3-6  None Seen /LPF Final   • Methodology 02/03/2017 Manual Light Microscopy   Final       Xr Knee 3 View Left    Result Date: 2/3/2017  Narrative: STANDING 2 VIEWS OF THE LEFT KNEE  HISTORY:  Female who is 83 years-old presents for preop evaluation prior to knee replacement.  COMPARISON: None available  FINDINGS: 1. There is pan compartmental osteoarthritis with lateral compartmental narrowing. 2. Large defect distal lateral femoral condyle suggesting AVN. 3. Mild  chondrocalcinosis, moderate joint effusion  2 VIEWS OF THE CHEST  HISTORY: Female who is 83 years-old, preop knee replacement  COMPARISON: (none available)  FINDINGS: 1. Cardiac enlargement mild vascular calcification granulomatous calcification apical pleural thickening right greater than left. 2. No active pulmonary process.  This report was finalized on 2/3/2017 4:37 PM by Dr. Teddy Lantigua MD.      Xr Chest Pa & Lateral    Result Date: 2/3/2017  Narrative: STANDING 2 VIEWS OF THE LEFT KNEE  HISTORY:  Female who is 83 years-old presents for preop evaluation prior to knee replacement.  COMPARISON: None available  FINDINGS: 1. There is pan compartmental osteoarthritis with lateral compartmental narrowing. 2. Large defect distal lateral femoral condyle suggesting AVN. 3. Mild chondrocalcinosis, moderate joint effusion  2 VIEWS OF THE CHEST  HISTORY: Female who is 83 years-old, preop knee replacement  COMPARISON: (none available)  FINDINGS: 1. Cardiac enlargement mild vascular calcification granulomatous calcification apical pleural thickening right greater than left. 2. No active pulmonary process.  This report was finalized on 2/3/2017 4:37 PM by Dr. Teddy Lantigua MD.          ASSESSMENT:  Left KNee OA  Patient Active Problem List   Diagnosis   • Osteoarthritis of left knee       PLAN:    Left TKA    Risks and benefits of surgical intervention were discussed in detail with the patient.  Risks of infection, fracture, dislocation, extremity length discrepancy, neurovascular injury, persistent pain, medical risks, anesthetic risk, need for additional surgery, deep venous thrombosis, pulmonary embolism and death.      The above diagnosis and treatment plan was discussed with the patient and/or family.  They were educated in both non-surgical and surgical treatment options for their condition.   They were given the opportunity to ask questions and were answered to their satisfaction.  They agreed to proceed with the  above treatment plan.        Gregory Carvajal MD  Date: 2/15/2017

## 2017-02-15 NOTE — ANESTHESIA PROCEDURE NOTES
Airway  Urgency: elective    Airway not difficult    General Information and Staff    Patient location during procedure: OR  Anesthesiologist: FARZANA WELCH  CRNA: AYAZ MAHAN    Indications and Patient Condition  Indications for airway management: airway protection    Preoxygenated: yes  MILS maintained throughout  Mask difficulty assessment: 1 - vent by mask    Final Airway Details  Final airway type: supraglottic airway      Successful airway: classic  Size 4    Number of attempts at approach: 1    Additional Comments  Pt preoxygenated, sivi, LMA placed with adequate seal and TV

## 2017-02-16 LAB
ANION GAP SERPL CALCULATED.3IONS-SCNC: 15.9 MMOL/L
BUN BLD-MCNC: 18 MG/DL (ref 8–23)
BUN/CREAT SERPL: 23.4 (ref 7–25)
CALCIUM SPEC-SCNC: 8.4 MG/DL (ref 8.6–10.5)
CHLORIDE SERPL-SCNC: 96 MMOL/L (ref 98–107)
CO2 SERPL-SCNC: 25.1 MMOL/L (ref 22–29)
CREAT BLD-MCNC: 0.77 MG/DL (ref 0.57–1)
GFR SERPL CREATININE-BSD FRML MDRD: 72 ML/MIN/1.73
GLUCOSE BLD-MCNC: 166 MG/DL (ref 65–99)
HCT VFR BLD AUTO: 32.4 % (ref 35.6–45.5)
HGB BLD-MCNC: 11 G/DL (ref 11.9–15.5)
POTASSIUM BLD-SCNC: 3.9 MMOL/L (ref 3.5–5.2)
SODIUM BLD-SCNC: 137 MMOL/L (ref 136–145)

## 2017-02-16 PROCEDURE — 97161 PT EVAL LOW COMPLEX 20 MIN: CPT

## 2017-02-16 PROCEDURE — 25010000002 ENOXAPARIN PER 10 MG: Performed by: ORTHOPAEDIC SURGERY

## 2017-02-16 PROCEDURE — 80048 BASIC METABOLIC PNL TOTAL CA: CPT | Performed by: ORTHOPAEDIC SURGERY

## 2017-02-16 PROCEDURE — 25010000003 CEFAZOLIN IN DEXTROSE 2-4 GM/100ML-% SOLUTION: Performed by: ORTHOPAEDIC SURGERY

## 2017-02-16 PROCEDURE — 85018 HEMOGLOBIN: CPT | Performed by: ORTHOPAEDIC SURGERY

## 2017-02-16 PROCEDURE — 97110 THERAPEUTIC EXERCISES: CPT

## 2017-02-16 PROCEDURE — 85014 HEMATOCRIT: CPT | Performed by: ORTHOPAEDIC SURGERY

## 2017-02-16 PROCEDURE — 97150 GROUP THERAPEUTIC PROCEDURES: CPT

## 2017-02-16 RX ORDER — HYDROCODONE BITARTRATE AND ACETAMINOPHEN 5; 325 MG/1; MG/1
2 TABLET ORAL EVERY 4 HOURS PRN
Status: DISCONTINUED | OUTPATIENT
Start: 2017-02-16 | End: 2017-02-20 | Stop reason: HOSPADM

## 2017-02-16 RX ORDER — MORPHINE SULFATE 2 MG/ML
2 INJECTION, SOLUTION INTRAMUSCULAR; INTRAVENOUS
Status: DISCONTINUED | OUTPATIENT
Start: 2017-02-16 | End: 2017-02-20 | Stop reason: HOSPADM

## 2017-02-16 RX ORDER — HYDROCODONE BITARTRATE AND ACETAMINOPHEN 5; 325 MG/1; MG/1
1 TABLET ORAL EVERY 4 HOURS PRN
Status: DISCONTINUED | OUTPATIENT
Start: 2017-02-16 | End: 2017-02-20 | Stop reason: HOSPADM

## 2017-02-16 RX ORDER — ONDANSETRON 2 MG/ML
4 INJECTION INTRAMUSCULAR; INTRAVENOUS EVERY 6 HOURS PRN
Status: DISCONTINUED | OUTPATIENT
Start: 2017-02-16 | End: 2017-02-20 | Stop reason: HOSPADM

## 2017-02-16 RX ADMIN — HYDROCODONE BITARTRATE AND ACETAMINOPHEN 2 TABLET: 5; 325 TABLET ORAL at 14:02

## 2017-02-16 RX ADMIN — BISOPROLOL FUMARATE AND HYDROCHLOROTHIAZIDE 1 TABLET: 6.25; 5 TABLET ORAL at 09:19

## 2017-02-16 RX ADMIN — DOCUSATE SODIUM -SENNOSIDES 2 TABLET: 50; 8.6 TABLET, COATED ORAL at 18:29

## 2017-02-16 RX ADMIN — CARBIDOPA AND LEVODOPA 1.5 TABLET: 25; 100 TABLET ORAL at 09:19

## 2017-02-16 RX ADMIN — OXYCODONE HYDROCHLORIDE AND ACETAMINOPHEN 1 TABLET: 10; 325 TABLET ORAL at 01:02

## 2017-02-16 RX ADMIN — ENOXAPARIN SODIUM 40 MG: 40 INJECTION SUBCUTANEOUS at 09:19

## 2017-02-16 RX ADMIN — CARBIDOPA AND LEVODOPA 1.5 TABLET: 25; 100 TABLET ORAL at 20:23

## 2017-02-16 RX ADMIN — HYDROCODONE BITARTRATE AND ACETAMINOPHEN 1 TABLET: 5; 325 TABLET ORAL at 09:19

## 2017-02-16 RX ADMIN — CEFAZOLIN SODIUM 2 G: 2 INJECTION, SOLUTION INTRAVENOUS at 04:40

## 2017-02-16 RX ADMIN — HYDROCODONE BITARTRATE AND ACETAMINOPHEN 2 TABLET: 5; 325 TABLET ORAL at 20:23

## 2017-02-16 RX ADMIN — CARBIDOPA AND LEVODOPA 1.5 TABLET: 25; 100 TABLET ORAL at 16:02

## 2017-02-16 RX ADMIN — DOCUSATE SODIUM -SENNOSIDES 2 TABLET: 50; 8.6 TABLET, COATED ORAL at 09:19

## 2017-02-16 NOTE — PLAN OF CARE
Problem: Patient Care Overview (Adult)  Goal: Plan of Care Review  Outcome: Ongoing (interventions implemented as appropriate)    02/16/17 1542   Coping/Psychosocial Response Interventions   Plan Of Care Reviewed With patient   Outcome Evaluation   Outcome Summary/Follow up Plan Pt w/ improved upright stability and ambulation endurance this date. Required frequent cuing for improved safety awareness and sequencing w/ RWX. No new concerns.

## 2017-02-16 NOTE — PLAN OF CARE
Problem: Patient Care Overview (Adult)  Goal: Plan of Care Review  Outcome: Ongoing (interventions implemented as appropriate)    02/16/17 0309   Coping/Psychosocial Response Interventions   Plan Of Care Reviewed With patient   Patient Care Overview   Progress improving   Outcome Evaluation   Outcome Summary/Follow up Plan decreased mobility due to parkinson's, po pain medication effective.       Goal: Adult Individualization and Mutuality  Outcome: Ongoing (interventions implemented as appropriate)  Goal: Discharge Needs Assessment  Outcome: Ongoing (interventions implemented as appropriate)    Problem: Knee Replacement, Total (Adult)  Goal: Signs and Symptoms of Listed Potential Problems Will be Absent or Manageable (Knee Replacement, Total)  Outcome: Ongoing (interventions implemented as appropriate)    Problem: Fall Risk (Adult)  Goal: Identify Related Risk Factors and Signs and Symptoms  Outcome: Ongoing (interventions implemented as appropriate)  Goal: Absence of Falls  Outcome: Ongoing (interventions implemented as appropriate)

## 2017-02-16 NOTE — PROGRESS NOTES
Discharge Planning Assessment  [facility]     Patient Name: Gregoria Jimenez  MRN: [id number]  Today's Date: 2/16/2017    Admit Date: 2/15/2017          Discharge Needs Assessment       02/16/17 1715    Living Environment    Lives With alone    Living Arrangements assisted living    Discharge Needs Assessment    Concerns To Be Addressed basic needs concerns    Readmission Within The Last 30 Days no previous admission in last 30 days    Transportation Available car;family or friend will provide;ambulance            Discharge Plan       02/16/17 1713    Case Management/Social Work Plan    Plan [facility], pending bed status.     Patient/Family In Agreement With Plan yes    Additional Comments Met w/ pt at the bedsid , verified facesheet and explained CCP role. Pt is pre-registered w/ [facility]. Janae/ Isaura notified, checking bed status .         Discharge Placement     Facility/Agency Request Status Selected? Address Phone Number Fax Number    [facility] Pending - Request Sent     [address] [facility] [id number] [phone]        Annie Marie RN 2/16/2017 17:17    2/16/2017  Janae to check bed status.Annie Marie RN                             Demographic Summary     None            Functional Status       02/16/17 1716    IADL    Meal Preparation assistive person    Housekeeping assistive person    Laundry assistive person    Shopping assistive person    Cognitive/Perceptual/Developmental    Current Mental Status/Cognitive Functioning lacks insight into situation    Recent Changes in Mental Status/Cognitive Functioning unable to assess      02/16/17 1714    Functional Status Current    Ambulation 3-->assistive equipment and person    Transferring 3-->assistive equipment and person    Toileting 3-->assistive equipment and person    Bathing 2-->assistive person    Dressing 2-->assistive person    Eating 2-->assistive person    Swallowing (if score 2 or more for any item,  consult Rehab Services) 0-->swallows foods/liquids without difficulty    Change in Functional Status Since Onset of Current Illness/Injury yes    Functional Status Prior    Ambulation 0-->independent    Transferring 0-->independent    Toileting 0-->independent    Bathing 0-->independent    Dressing 0-->independent    Eating 0-->independent    Communication 0-->understands/communicates without difficulty    Swallowing 0-->swallows foods/liquids without difficulty    IADL    Medications independent    Meal Preparation independent    Housekeeping independent    Laundry independent    Shopping independent    Oral Care independent    Activity Tolerance    Usual Activity Tolerance moderate    Current Activity Tolerance fair            Psychosocial     None            Abuse/Neglect     None            Legal     None            Substance Abuse     None            Patient Forms       02/16/17 1099    Patient Forms    Provider Choice List Delivered    Delivered to Patient    Method of delivery In person          Annie Marie RN

## 2017-02-16 NOTE — PROGRESS NOTES
Procedure(s):  LT TOTAL KNEE ARTHROPLASTY     LOS: 1 day     Subjective :   Complains of pain, Had a bad night. Little confused this am per RN.  Thinks she has two roommates.      Objective :    Vital signs in last 24 hours:  Vitals:    02/15/17 1830 02/15/17 2004 02/15/17 2329 02/16/17 0250   BP: 158/72 142/72 152/68 164/72   BP Location: Right arm Left arm Left arm Left arm   Patient Position: Lying Lying Lying Lying   Pulse: 72 67 70 68   Resp: 16 16 16 16   Temp: 97.6 °F (36.4 °C) 98.3 °F (36.8 °C) 97.5 °F (36.4 °C) 97 °F (36.1 °C)   TempSrc: Oral Oral Oral Oral   SpO2: 98% 98% 96% 98%   Weight:       Height:           PHYSICAL EXAM:  Patient is calm, in no acute distress, awake and oriented x 2.  Seems a little confused about situation.    Dressing is clean, dry and intact.  No signs of infection.  Swelling is appropriate in amount.  Ecchymosis is appropriate in amount.  Homans test is negative.  Patient is neurovascularly intact distally.    LABS:    Results from last 7 days  Lab Units 02/16/17  0309   HEMOGLOBIN g/dL 11.0*   HEMATOCRIT % 32.4*       Results from last 7 days  Lab Units 02/16/17  0309   SODIUM mmol/L 137   POTASSIUM mmol/L 3.9   CHLORIDE mmol/L 96*   TOTAL CO2 mmol/L 25.1   BUN mg/dL 18   CREATININE mg/dL 0.77   GLUCOSE mg/dL 166*   CALCIUM mg/dL 8.4*           ASSESSMENT:  Status post Procedure(s):  LT TOTAL KNEE ARTHROPLASTY      Plan:  Continue Physical Therapy, increase mobility and range of motion as tolerated.  Continue SCDs, Continue Lovenox for DVT prophylaxis while inpatient.  Dispo planning for [facility] Saturday.    Gregory Carvajal MD    Date: 2/16/2017  Time: 6:09 AM

## 2017-02-16 NOTE — PLAN OF CARE
Problem: Patient Care Overview (Adult)  Goal: Plan of Care Review    02/16/17 1003   Coping/Psychosocial Response Interventions   Plan Of Care Reviewed With patient   Outcome Evaluation   Outcome Summary/Follow up Plan Pt s/p L TKA presents with post op pain, weakness, and impaired L knee ROM limiting mobility. Pt also w/hx of Parkinson's further limiiting balance and overall mobility. Pt would benefit from skilled PT to address impairments and assist w/return to PLOF.          Problem: Inpatient Physical Therapy  Goal: Transfer Training Goal 1 LTG- PT    02/16/17 1003   Transfer Training PT LTG   Transfer Training PT LTG, Date Established 02/16/17   Transfer Training PT LTG, Time to Achieve 3 days   Transfer Training PT LTG, Activity Type all transfers   Transfer Training PT LTG, [place] Level supervision required   Transfer Training PT LTG, Assist Device walker, standard       Goal: Gait Training Goal LTG- PT    02/16/17 1003   Gait Training PT LTG   Gait Training Goal PT LTG, Date Established 02/16/17   Gait Training Goal PT LTG, Time to Achieve 3 days   Gait Training Goal PT LTG, [place] Level contact guard assist   Gait Training Goal PT LTG, Assist Device walker, standard   Gait Training Goal PT LTG, Distance to Achieve 75       Goal: Range of Motion Goal LTG- PT    02/16/17 1003   Range of Motion PT LTG   Range of Motion Goal PT LTG, Date Established 02/16/17   Range of Motion Goal PT LTG, Time to Achieve 3 days   Range fo Motion Goal PT LTG, Joint L knee   Range of Motion Goal PT LTG, AROM Measure 0-90       Goal: Patient Education Goal LTG- PT    02/16/17 1003   Patient Education PT LTG   Patient Education PT LTG, Date Established 02/16/17   Patient Education PT LTG, Time to Achieve 3 days   Patient Education PT LTG, Education Type HEP   Patient Education PT LTG, Education Understanding demonstrate adequately;verbalize understanding

## 2017-02-16 NOTE — PROGRESS NOTES
Acute Care - Physical Therapy Initial Evaluation  [facility]     Patient Name: Gregoria Jimenez  : 1934  MRN: [id number]  Today's Date: 2017   Onset of Illness/Injury or Date of Surgery Date: 02/15/17     Referring Physician: Alena      Admit Date: 2/15/2017     Visit Dx:    ICD-10-CM ICD-9-CM   1. Difficulty walking R26.2 719.7     Patient Active Problem List   Diagnosis   • Osteoarthritis of left knee     Past Medical History   Diagnosis Date   • Arthritis    • History of bronchiectasis         • Hypertension    • Neuropathy    • Parkinson disease      Past Surgical History   Procedure Laterality Date   • Eye surgery     • Knee arthroscopy            PT ASSESSMENT (last 72 hours)      PT Evaluation       17 0930 02/15/17 1926    Rehab Evaluation    Document Type evaluation  -EE     Subjective Information agree to therapy;complains of;pain  -EE     Patient Effort, Rehab Treatment good  -EE     Symptoms Noted Comment Hx of Parkinson's  -EE     General Information    Onset of Illness/Injury or Date of Surgery Date 02/15/17  -EE     Referring Physician Alena  -EE     General Observations Elderly female, sitting up in chair in no acute distress.  -EE     Pertinent History Of Current Problem s/p L TKA  -EE     Precautions/Limitations fall precautions  -EE     Prior Level of Function independent:;all household mobility;community mobility  -EE     Equipment Currently Used at Home walker, rolling  -EE     Plans/Goals Discussed With patient;agreed upon  -EE     Barriers to Rehab none identified  -EE     Living Environment    Lives With  alone  -KS    Living Arrangements  assisted living  -KS    Home Accessibility  no concerns  -KS    Stair Railings at Home  none  -KS    Type of Financial/Environmental Concern  none  -KS    Transportation Available  car;family or friend will provide  -KS    Clinical Impression    Patient/Family Goals Statement Decrease pain; go home  -EE     Criteria for Skilled  Therapeutic Interventions Met yes;treatment indicated  -EE     Pathology/Pathophysiology Noted (Describe Specifically for Each System) musculoskeletal  -EE     Impairments Found (describe specific impairments) gait, locomotion, and balance;ROM  -EE     Rehab Potential good, to achieve stated therapy goals  -EE     Pain Assessment    Pain Assessment 0-10  -EE     Pain Score 7  -EE     Pain Type Surgical pain  -EE     Pain Location Knee  -EE     Pain Orientation Left  -EE     Pain Intervention(s) Repositioned;Ambulation/increased activity  -EE     Response to Interventions tolerated  -EE     Cognitive Assessment/Intervention    Current Cognitive/Communication Assessment impaired   some confusion  -EE     Orientation Status oriented x 4  -EE     Follows Commands/Answers Questions 100% of the time;able to follow single-step instructions;needs cueing  -EE     Personal Safety mild impairment  -EE     Personal Safety Interventions fall prevention program maintained;gait belt;muscle strengthening facilitated;nonskid shoes/slippers when out of bed;supervised activity  -EE     ROM (Range of Motion)    General ROM lower extremity range of motion deficits identified  -EE     General ROM Detail L knee ROM impaired ~50%; all others grossly WFL  -EE     MMT (Manual Muscle Testing)    General MMT Assessment lower extremity strength deficits identified  -EE     General MMT Assessment Detail L knee 3-/5; all others functionally 4-/5  -EE     Bed Mobility, Assessment/Treatment    Bed Mob, Supine to Sit, [place] not tested  -EE     Bed Mob, Sit to Supine, [place] not tested  -EE     Transfer Assessment/Treatment    Transfers, Sit-Stand [place] minimum assist (75% patient effort);verbal cues required  -EE     Transfers, Stand-Sit [place] minimum assist (75% patient effort)  -EE     Transfers, Sit-Stand-Sit, Assist Device rolling walker  -EE     Toilet Transfer, [place] minimum assist (75% patient effort);verbal  cues required  -EE     Toilet Transfer, Assistive Device rolling walker  -EE     Transfer, Impairments strength decreased;impaired balance;pain;ROM decreased  -EE     Gait Assessment/Treatment    Gait, [place] Level contact guard assist;minimum assist (75% patient effort);verbal cues required  -EE     Gait, Assistive Device standard walker  -EE     Gait, Distance (Feet) 25  -EE     Gait, Gait Pattern Analysis 3-point gait  -EE     Gait, Gait Deviations forward flexed posture;marcos decreased;left:;antalgic;decreased heel strike;step length decreased;stride length decreased;swing-to-stance ratio decreased;toe-to-floor clearance decreased;weight-shifting ability decreased  -EE     Gait, Safety Issues step length decreased  -EE     Gait, Impairments strength decreased;impaired balance;ROM decreased;pain  -EE     Gait, Comment Unable to place L foot flat during stance; early heel rise during R stance; vc's to widen LEAH and for sequencing  -EE     Motor Skills/Interventions    Additional Documentation Balance Skills Training (Group)  -EE     Balance Skills Training    Sitting-Level of Assistance Close supervision  -EE     Standing-Level of Assistance Contact guard  -EE     Static Standing Balance Support assistive device  -EE     Gait Balance-Level of Assistance Contact guard;Minimum assistance  -EE     Gait Balance Support assistive device  -EE     Therapy Exercises    Exercise Protocols total knee  -EE     Total Knee Exercises left:;10 reps;completed protocol  -EE     Positioning and Restraints    Pre-Treatment Position sitting in chair/recliner  -EE     Post Treatment Position chair  -EE     In Chair reclined;call light within reach;encouraged to call for assist;with family/caregiver;exit alarm on;legs elevated  -EE       02/15/17 1924 02/15/17 1017    General Information    Equipment Currently Used at Home none  -KS shower chair;walker, rolling;cane, straight;bath bench   scooter  -TG    Living Environment     Lives With  --   will have assistance when she goes back to assisted living  -TG    Living Arrangements  assisted living  -TG    Home Accessibility  no concerns;bed and bath on same level  -TG    Type of Financial/Environmental Concern  none  -TG      User Key  (r) = Recorded By, (t) = Taken By, (c) = Cosigned By    Initials Name Provider Type    EE Zayra Niño, RONIT Physical Therapist    TG Hali Bui, RN Registered Nurse    SHIRA Ernst RN Registered Nurse          Physical Therapy Education     Title: PT OT SLP Therapies (Done)     Topic: Physical Therapy (Done)     Point: Mobility training (Done)    Learning Progress Summary    Learner Readiness Method Response Comment Documented by Status   Patient Acceptance E,TB VU,NR  EE 02/16/17 1003 Done               Point: Home exercise program (Done)    Learning Progress Summary    Learner Readiness Method Response Comment Documented by Status   Patient Acceptance E,TB VU,NR  EE 02/16/17 1003 Done               Point: Body mechanics (Done)    Learning Progress Summary    Learner Readiness Method Response Comment Documented by Status   Patient Acceptance E,TB VU,NR  EE 02/16/17 1003 Done                      User Key     Initials Effective Dates Name Provider Type Discipline     12/01/15 -  Zayra Niño PT Physical Therapist PT                PT Recommendation and Plan  Anticipated Discharge Disposition: skilled nursing facility  Planned Therapy Interventions: balance training, bed mobility training, gait training, home exercise program, patient/family education, ROM (Range of Motion), strengthening, stretching, transfer training  PT Frequency: 2 times/day  Plan of Care Review  Plan Of Care Reviewed With: patient  Outcome Summary/Follow up Plan: Pt s/p L TKA presents with post op pain, weakness, and impaired L knee ROM limiting mobility. Pt also w/hx of Parkinson's further limiiting balance and overall mobility. Pt would benefit from skilled PT to address  impairments and assist w/return to PLOF.           IP PT Goals       02/16/17 1003          Transfer Training PT LTG    Transfer Training PT LTG, Date Established 02/16/17  -EE      Transfer Training PT LTG, Time to Achieve 3 days  -EE      Transfer Training PT LTG, Activity Type all transfers  -EE      Transfer Training PT LTG, [place] Level supervision required  -EE      Transfer Training PT LTG, Assist Device walker, standard  -EE      Gait Training PT LTG    Gait Training Goal PT LTG, Date Established 02/16/17  -EE      Gait Training Goal PT LTG, Time to Achieve 3 days  -EE      Gait Training Goal PT LTG, [place] Level contact guard assist  -EE      Gait Training Goal PT LTG, Assist Device walker, standard  -EE      Gait Training Goal PT LTG, Distance to Achieve 75  -EE      Range of Motion PT LTG    Range of Motion Goal PT LTG, Date Established 02/16/17  -EE      Range of Motion Goal PT LTG, Time to Achieve 3 days  -EE      Range fo Motion Goal PT LTG, Joint L knee  -EE      Range of Motion Goal PT LTG, AROM Measure 0-90  -EE      Patient Education PT LTG    Patient Education PT LTG, Date Established 02/16/17  -EE      Patient Education PT LTG, Time to Achieve 3 days  -EE      Patient Education PT LTG, Education Type HEP  -EE      Patient Education PT LTG, Education Understanding demonstrate adequately;verbalize understanding  -EE        User Key  (r) = Recorded By, (t) = Taken By, (c) = Cosigned By    Initials Name Provider Type    EE Zayra Niño, PT Physical Therapist                Outcome Measures       02/16/17 1000          How much help from another person do you currently need...    Turning from your back to your side while in flat bed without using bedrails? 3  -EE      Moving from lying on back to sitting on the side of a flat bed without bedrails? 3  -EE      Moving to and from a bed to a chair (including a wheelchair)? 3  -EE      Standing up from a chair using your arms (e.g., wheelchair,  bedside chair)? 3  -EE      Climbing 3-5 steps with a railing? 2  -EE      To walk in hospital room? 3  -EE      AM-PAC 6 Clicks Score 17  -EE      Functional Assessment    Outcome Measure Options AM-PAC 6 Clicks Basic Mobility (PT)  -EE        User Key  (r) = Recorded By, (t) = Taken By, (c) = Cosigned By    Initials Name Provider Type    EE Zayra Niño PT Physical Therapist           Time Calculation:         PT Charges       02/16/17 1005          Time Calculation    Start Time 0935  -EE      Stop Time 1000  -EE      Time Calculation (min) 25 min  -EE      PT Received On 02/16/17  -EE      PT - Next Appointment 02/16/17  -EE      PT Goal Re-Cert Due Date 02/19/17  -EE        User Key  (r) = Recorded By, (t) = Taken By, (c) = Cosigned By    Initials Name Provider Type    EE Zayra Niño PT Physical Therapist          Therapy Charges for Today     Code Description Service Date Service Provider Modifiers Qty    01321861831 HC PT EVAL LOW COMPLEXITY 2 2/16/2017 Zayra Niño, PT GP 1    55582027890 HC PT THER PROC EA 15 MIN 2/16/2017 Zayra Niño PT GP 1          PT G-Codes  Outcome Measure Options: AM-PAC 6 Clicks Basic Mobility (PT)      Zayra Niño PT  2/16/2017

## 2017-02-16 NOTE — PROGRESS NOTES
Acute Care - Physical Therapy Treatment Note  [facility]     Patient Name: Gregoria Jimenez  : 1934  MRN: [id number]  Today's Date: 2017  Onset of Illness/Injury or Date of Surgery Date: 02/15/17     Referring Physician: Alena    Admit Date: 2/15/2017    Visit Dx:    ICD-10-CM ICD-9-CM   1. Difficulty walking R26.2 719.7     Patient Active Problem List   Diagnosis   • Osteoarthritis of left knee               Adult Rehabilitation Note       17 1310          Rehab Assessment/Intervention    Discipline physical therapy assistant  -KEYSHA      Document Type therapy note (daily note)  -KEYSHA      Subjective Information agree to therapy  -KEYSHA      Patient Effort, Rehab Treatment good  -KEYSHA      Precautions/Limitations fall precautions  -KEYSHA      Recorded by [KEYSHA] Nic Parikh PTA      Pain Assessment    Pain Assessment 0-10  -KEYSHA      Pain Score 5  -KEYSHA      Pain Type Acute pain;Surgical pain  -KEYSHA      Pain Location Knee  -KEYSHA      Pain Orientation Left  -KEYSHA      Pain Intervention(s) Ambulation/increased activity;Repositioned;Rest  -KEYSHA      Response to Interventions tolerated  -KEYSHA      Recorded by [KEYSHA] Nic Parikh PTA      Cognitive Assessment/Intervention    Current Cognitive/Communication Assessment impaired  -KEYSHA      Orientation Status oriented to;person;situation  -KEYSHA      Follows Commands/Answers Questions 75% of the time;able to follow single-step instructions  -KEYSHA      Personal Safety mild impairment  -KEYSHA      Personal Safety Interventions fall prevention program maintained;gait belt;nonskid shoes/slippers when out of bed  -KEYSHA      Recorded by [KEYSHA] Nic Parikh PTA      ROM (Range of Motion)    General ROM lower extremity range of motion deficits identified  -KEYSHA      Recorded by [KEYSHA] Nic Parikh PTA      General LE Assessment    ROM knee, left: LE ROM deficit  -KEYSHA      ROM Detail 26-82'  -KEYSHA      Recorded by [KEYSHA] Nic Parikh PTA      Bed Mobility, Assessment/Treatment    Bed Mob, Supine to Sit,  [place] not tested  -KEYSHA      Bed Mob, Sit to Supine, [place] not tested  -KEYSHA      Recorded by [KEYSHA] Nic Parikh PTA      Transfer Assessment/Treatment    Transfers, Sit-Stand [place] contact guard assist;verbal cues required  -KEYSHA      Transfers, Stand-Sit [place] contact guard assist;verbal cues required  -KEYSHA      Transfers, Sit-Stand-Sit, Assist Device rolling walker  -KESYHA      Transfer, Impairments strength decreased;impaired balance;pain;ROM decreased  -KEYSHA      Recorded by [KEYSHA] Nic Parikh PTA      Gait Assessment/Treatment    Gait, [place] Level minimum assist (75% patient effort);verbal cues required;nonverbal cues required (demo/gesture)  -KEYSHA      Gait, Assistive Device rolling walker  -KEYSHA      Gait, Distance (Feet) 45  -KEYSHA      Gait, Gait Deviations forward flexed posture;m[place] decreased;left:;antalgic;decreased heel strike;step length decreased;stride length decreased;swing-to-stance ratio decreased;toe-to-floor clearance decreased;weight-shifting ability decreased  -KEYSHA      Gait, Safety Issues step length decreased  -KEYSHA      Gait, Impairments strength decreased;impaired balance;ROM decreased;pain  -KEYSHA      Recorded by [KEYSHA] Nic Parikh PTA      Therapy Exercises    Exercise Protocols total knee  -KEYSHA      Total Knee Exercises left:;15 reps;completed protocol  -KEYSHA      Recorded by [KEYSHA] Nic Parikh PTA      Positioning and Restraints    Pre-Treatment Position sitting in chair/recliner  -KEYSHA      Post Treatment Position chair  -KEYSHA      In Chair reclined;call light within reach;encouraged to call for assist  -KEYSHA      Recorded by [KEYSHA] Nic Parikh PTA        User Key  (r) = Recorded By, (t) = Taken By, (c) = Cosigned By    Initials Name Effective Dates    KEYSHA Parikh PTA 04/24/15 -                 IP PT Goals       02/16/17 1003          Transfer Training PT LTG    Transfer Training PT LTG, Date Established 02/16/17  -EE      Transfer Training PT LTG, Time to Achieve 3 days  -EE       Transfer Training PT LTG, Activity Type all transfers  -EE      Transfer Training PT LTG, [place] Level supervision required  -EE      Transfer Training PT LTG, Assist Device walker, standard  -EE      Gait Training PT LTG    Gait Training Goal PT LTG, Date Established 02/16/17  -EE      Gait Training Goal PT LTG, Time to Achieve 3 days  -EE      Gait Training Goal PT LTG, [place] Level contact guard assist  -EE      Gait Training Goal PT LTG, Assist Device walker, standard  -EE      Gait Training Goal PT LTG, Distance to Achieve 75  -EE      Range of Motion PT LTG    Range of Motion Goal PT LTG, Date Established 02/16/17  -EE      Range of Motion Goal PT LTG, Time to Achieve 3 days  -EE      Range fo Motion Goal PT LTG, Joint L knee  -EE      Range of Motion Goal PT LTG, AROM Measure 0-90  -EE      Patient Education PT LTG    Patient Education PT LTG, Date Established 02/16/17  -EE      Patient Education PT LTG, Time to Achieve 3 days  -EE      Patient Education PT LTG, Education Type HEP  -EE      Patient Education PT LTG, Education Understanding demonstrate adequately;verbalize understanding  -EE        User Key  (r) = Recorded By, (t) = Taken By, (c) = Cosigned By    Initials Name Provider Type    EE Zayra Niño, PT Physical Therapist          Physical Therapy Education     Title: PT OT SLP Therapies (Done)     Topic: Physical Therapy (Done)     Point: Mobility training (Done)    Learning Progress Summary    Learner Readiness Method Response Comment Documented by Status   Patient Acceptance E VU  KEYSHA 02/16/17 1541 Done    Acceptance E,TB VU,NR  EE 02/16/17 1003 Done               Point: Home exercise program (Done)    Learning Progress Summary    Learner Readiness Method Response Comment Documented by Status   Patient Acceptance E VU  KEYSHA 02/16/17 1541 Done    Acceptance E,TB VU,NR  EE 02/16/17 1003 Done               Point: Body mechanics (Done)    Learning Progress Summary    Learner Readiness  Method Response Comment Documented by Status   Patient Acceptance E VU  KEYSHA 02/16/17 1541 Done    Acceptance E,TB VU,NR  EE 02/16/17 1003 Done                      User Key     Initials Effective Dates Name Provider Type Discipline    EE 12/01/15 -  Zayra Niño, PT Physical Therapist PT    KEYSHA 04/24/15 -  Nic Parikh PTA Physical Therapy Assistant PT                    PT Recommendation and Plan  Anticipated Discharge Disposition: skilled nursing facility  Planned Therapy Interventions: balance training, bed mobility training, gait training, home exercise program, patient/family education, ROM (Range of Motion), strengthening, stretching, transfer training  PT Frequency: 2 times/day  Plan of Care Review  Plan Of Care Reviewed With: patient  Outcome Summary/Follow up Plan: Pt w/ improved upright stability and ambulation endurance this date. Required frequent cuing for improved safety awareness and sequencing w/ RWX.   No new concerns.           Outcome Measures       02/16/17 1500 02/16/17 1000       How much help from another person do you currently need...    Turning from your back to your side while in flat bed without using bedrails? 3  -KEYSHA 3  -EE     Moving from lying on back to sitting on the side of a flat bed without bedrails? 3  -KEYSHA 3  -EE     Moving to and from a bed to a chair (including a wheelchair)? 3  -KEYSHA 3  -EE     Standing up from a chair using your arms (e.g., wheelchair, bedside chair)? 3  -KEYSHA 3  -EE     Climbing 3-5 steps with a railing? 2  -KEYSHA 2  -EE     To walk in hospital room? 3  -KEYSHA 3  -EE     AM-PAC 6 Clicks Score 17  -KEYSHA 17  -EE     Functional Assessment    Outcome Measure Options AM-PAC 6 Clicks Basic Mobility (PT)  -KEYSHA AM-PAC 6 Clicks Basic Mobility (PT)  -EE       User Key  (r) = Recorded By, (t) = Taken By, (c) = Cosigned By    Initials Name Provider Type    SALVADOR Niño, RONIT Physical Therapist    KEYSHA Parikh PTA Physical Therapy Assistant           Time Calculation:         PT  Charges       02/16/17 1540 02/16/17 1005       Time Calculation    Start Time 1310  -KEYSHA 0935  -EE     Stop Time 1353  -KEYSHA 1000  -EE     Time Calculation (min) 43 min  -KEYSHA 25 min  -EE     PT Received On 02/16/17  -KEYSHA 02/16/17  -EE     PT - Next Appointment 02/17/17  -KEYSHA 02/16/17  -EE     PT Goal Re-Cert Due Date  02/19/17  -EE       User Key  (r) = Recorded By, (t) = Taken By, (c) = Cosigned By    Initials Name Provider Type    SALVADOR Niño, PT Physical Therapist    KEYSHA Parikh PTA Physical Therapy Assistant          Therapy Charges for Today     Code Description Service Date Service Provider Modifiers Qty    36905474618 HC PT THER PROC GROUP 2/16/2017 Nic Parikh PTA GP 1    85522907437 HC PT THER PROC EA 15 MIN 2/16/2017 Nic Parikh PTA GP 1          PT G-Codes  Outcome Measure Options: AM-PAC 6 Clicks Basic Mobility (PT)    Nic Parikh PTA  2/16/2017

## 2017-02-16 NOTE — PLAN OF CARE
Problem: Patient Care Overview (Adult)  Goal: Plan of Care Review  Outcome: Ongoing (interventions implemented as appropriate)    02/16/17 1542 02/16/17 1831   Coping/Psychosocial Response Interventions   Plan Of Care Reviewed With patient --    Patient Care Overview   Progress --  progress toward functional goals as expected   Outcome Evaluation   Outcome Summary/Follow up Plan --  Pt ambulated with PT and staff. Voiding per BSC, pain controlled with PO meds.       Goal: Adult Individualization and Mutuality  Outcome: Ongoing (interventions implemented as appropriate)  Goal: Discharge Needs Assessment  Outcome: Ongoing (interventions implemented as appropriate)    Problem: Knee Replacement, Total (Adult)  Goal: Signs and Symptoms of Listed Potential Problems Will be Absent or Manageable (Knee Replacement, Total)  Outcome: Ongoing (interventions implemented as appropriate)    Problem: Fall Risk (Adult)  Goal: Identify Related Risk Factors and Signs and Symptoms  Outcome: Outcome(s) achieved Date Met:  02/16/17  Goal: Absence of Falls  Outcome: Ongoing (interventions implemented as appropriate)

## 2017-02-17 LAB
ANION GAP SERPL CALCULATED.3IONS-SCNC: 15.1 MMOL/L
BASOPHILS # BLD AUTO: 0.02 10*3/MM3 (ref 0–0.2)
BASOPHILS NFR BLD AUTO: 0.1 % (ref 0–1.5)
BUN BLD-MCNC: 17 MG/DL (ref 8–23)
BUN/CREAT SERPL: 22.4 (ref 7–25)
CALCIUM SPEC-SCNC: 9 MG/DL (ref 8.6–10.5)
CHLORIDE SERPL-SCNC: 93 MMOL/L (ref 98–107)
CO2 SERPL-SCNC: 25.9 MMOL/L (ref 22–29)
CREAT BLD-MCNC: 0.76 MG/DL (ref 0.57–1)
DEPRECATED RDW RBC AUTO: 43.9 FL (ref 37–54)
EOSINOPHIL # BLD AUTO: 0.08 10*3/MM3 (ref 0–0.7)
EOSINOPHIL NFR BLD AUTO: 0.6 % (ref 0.3–6.2)
ERYTHROCYTE [DISTWIDTH] IN BLOOD BY AUTOMATED COUNT: 13.5 % (ref 11.7–13)
GFR SERPL CREATININE-BSD FRML MDRD: 73 ML/MIN/1.73
GLUCOSE BLD-MCNC: 140 MG/DL (ref 65–99)
HCT VFR BLD AUTO: 31.8 % (ref 35.6–45.5)
HGB BLD-MCNC: 10.9 G/DL (ref 11.9–15.5)
IMM GRANULOCYTES # BLD: 0.02 10*3/MM3 (ref 0–0.03)
IMM GRANULOCYTES NFR BLD: 0.1 % (ref 0–0.5)
LYMPHOCYTES # BLD AUTO: 1.75 10*3/MM3 (ref 0.9–4.8)
LYMPHOCYTES NFR BLD AUTO: 12.1 % (ref 19.6–45.3)
MCH RBC QN AUTO: 30.4 PG (ref 26.9–32)
MCHC RBC AUTO-ENTMCNC: 34.3 G/DL (ref 32.4–36.3)
MCV RBC AUTO: 88.6 FL (ref 80.5–98.2)
MONOCYTES # BLD AUTO: 1.67 10*3/MM3 (ref 0.2–1.2)
MONOCYTES NFR BLD AUTO: 11.6 % (ref 5–12)
NEUTROPHILS # BLD AUTO: 10.91 10*3/MM3 (ref 1.9–8.1)
NEUTROPHILS NFR BLD AUTO: 75.5 % (ref 42.7–76)
PLATELET # BLD AUTO: 170 10*3/MM3 (ref 140–500)
PMV BLD AUTO: 10.4 FL (ref 6–12)
POTASSIUM BLD-SCNC: 3.5 MMOL/L (ref 3.5–5.2)
RBC # BLD AUTO: 3.59 10*6/MM3 (ref 3.9–5.2)
SODIUM BLD-SCNC: 134 MMOL/L (ref 136–145)
WBC NRBC COR # BLD: 14.45 10*3/MM3 (ref 4.5–10.7)

## 2017-02-17 PROCEDURE — 97150 GROUP THERAPEUTIC PROCEDURES: CPT

## 2017-02-17 PROCEDURE — 97110 THERAPEUTIC EXERCISES: CPT

## 2017-02-17 PROCEDURE — 25010000002 ENOXAPARIN PER 10 MG: Performed by: ORTHOPAEDIC SURGERY

## 2017-02-17 PROCEDURE — 85025 COMPLETE CBC W/AUTO DIFF WBC: CPT | Performed by: INTERNAL MEDICINE

## 2017-02-17 PROCEDURE — 80048 BASIC METABOLIC PNL TOTAL CA: CPT | Performed by: INTERNAL MEDICINE

## 2017-02-17 RX ADMIN — BISOPROLOL FUMARATE AND HYDROCHLOROTHIAZIDE 1 TABLET: 6.25; 5 TABLET ORAL at 08:19

## 2017-02-17 RX ADMIN — CARBIDOPA AND LEVODOPA 1.5 TABLET: 25; 100 TABLET ORAL at 22:20

## 2017-02-17 RX ADMIN — DOCUSATE SODIUM -SENNOSIDES 2 TABLET: 50; 8.6 TABLET, COATED ORAL at 08:19

## 2017-02-17 RX ADMIN — HYDROCODONE BITARTRATE AND ACETAMINOPHEN 1 TABLET: 5; 325 TABLET ORAL at 05:40

## 2017-02-17 RX ADMIN — DOCUSATE SODIUM -SENNOSIDES 2 TABLET: 50; 8.6 TABLET, COATED ORAL at 17:56

## 2017-02-17 RX ADMIN — CARBIDOPA AND LEVODOPA 1.5 TABLET: 25; 100 TABLET ORAL at 08:19

## 2017-02-17 RX ADMIN — ACETAMINOPHEN 650 MG: 325 TABLET ORAL at 22:20

## 2017-02-17 RX ADMIN — ENOXAPARIN SODIUM 40 MG: 40 INJECTION SUBCUTANEOUS at 08:19

## 2017-02-17 RX ADMIN — CARBIDOPA AND LEVODOPA 1.5 TABLET: 25; 100 TABLET ORAL at 17:56

## 2017-02-17 NOTE — PROGRESS NOTES
LOS: 2 days   Patient Care Team:  Rosanne Bland MD as PCP - General (Geriatric Medicine)    No chief complaint on file.        Subjective   Sitting up in chair eating dinner. Reports that her pain is well controlled other than when she tries to stand on her leg. No nausea, no SOA, CP.       Objective     Vital Signs  Temp:  [96.6 °F (35.9 °C)-98.8 °F (37.1 °C)] 96.6 °F (35.9 °C)  Heart Rate:  [66-89] 73  Resp:  [16-24] 16  BP: (115-161)/(49-69) 141/58    Physical Exam:  WDWN WF in NAD, looks really good actually, very alert & dressed in street clothes  Skin warm & dry  Lungs clear  Heart RRR  Ext no edema       Results Review:     I reviewed the patient's new clinical results.    Medication Review:       LABS    Results from last 7 days  Lab Units 02/17/17  0424 02/16/17  0309   SODIUM mmol/L 134* 137   POTASSIUM mmol/L 3.5 3.9   CHLORIDE mmol/L 93* 96*   TOTAL CO2 mmol/L 25.9 25.1   BUN mg/dL 17 18   CREATININE mg/dL 0.76 0.77   GLUCOSE mg/dL 140* 166*   CALCIUM mg/dL 9.0 8.4*       Results from last 7 days  Lab Units 02/17/17  0424 02/16/17  0309 02/15/17  1855   WBC 10*3/mm3 14.45*  --   --    HEMOGLOBIN g/dL 10.9* 11.0* 12.0   HEMATOCRIT % 31.8* 32.4* 36.0   PLATELETS 10*3/mm3 170  --   --                Assessment/Plan     Active Problems:    Osteoarthritis of left knee    2. Postop delirium/toxic encephalopathy -  3. Leukocytosis - likely stress leukocytosis but will cont to monitor.  4. HTN  5. Parkinson's disease - on home dose of meds  6. Acute blood loss anemia - pretty mild so far.  7. Hyperglycemia without h/o DM.  Better on today's labs        Becca Cuevas MD  02/17/17  4:54 PM      Time:

## 2017-02-17 NOTE — PLAN OF CARE
Problem: Patient Care Overview (Adult)  Goal: Plan of Care Review  Outcome: Ongoing (interventions implemented as appropriate)    02/17/17 0520   Coping/Psychosocial Response Interventions   Plan Of Care Reviewed With patient   Patient Care Overview   Progress improving   Outcome Evaluation   Outcome Summary/Follow up Plan pain well controlled         Problem: Fall Risk (Adult)  Goal: Absence of Falls  Outcome: Ongoing (interventions implemented as appropriate)    02/17/17 0520   Fall Risk (Adult)   Absence of Falls making progress toward outcome

## 2017-02-17 NOTE — PROGRESS NOTES
Procedure(s):  LT TOTAL KNEE ARTHROPLASTY     LOS: 2 days     Subjective :   Complains of pain.  PT going well    Objective :    Vital signs in last 24 hours:  Vitals:    02/16/17 2252 02/17/17 0248 02/17/17 0753 02/17/17 1126   BP: 115/55 157/62 144/52 123/49   BP Location: Left arm Left arm Left arm Left arm   Patient Position: Lying Lying Lying Sitting   Pulse: 67 72 89 66   Resp: 16 16 16 16   Temp: 97.2 °F (36.2 °C) 98.8 °F (37.1 °C) 96.8 °F (36 °C) 97.2 °F (36.2 °C)   TempSrc: Oral Oral Oral Oral   SpO2: 92% 96% 94% 98%   Weight:       Height:           PHYSICAL EXAM:  Patient is calm, in no acute distress, awake and oriented x 3.  Dressing is clean, dry and intact.  No signs of infection.  Swelling is appropriate in amount.  Ecchymosis is appropriate in amount.  Homans test is negative.  Patient is neurovascularly intact distally.    LABS:    Results from last 7 days  Lab Units 02/17/17  0424   WBC 10*3/mm3 14.45*   HEMOGLOBIN g/dL 10.9*   HEMATOCRIT % 31.8*   PLATELETS 10*3/mm3 170       Results from last 7 days  Lab Units 02/17/17  0424   SODIUM mmol/L 134*   POTASSIUM mmol/L 3.5   CHLORIDE mmol/L 93*   TOTAL CO2 mmol/L 25.9   BUN mg/dL 17   CREATININE mg/dL 0.76   GLUCOSE mg/dL 140*   CALCIUM mg/dL 9.0           ASSESSMENT:  Status post Procedure(s):  LT TOTAL KNEE ARTHROPLASTY      Plan:  Continue Physical Therapy, increase mobility and range of motion as tolerated.  Continue SCDs, Continue Lovenox for DVT prophylaxis while inpatient.  Dispo planning for rehab shoulder. .    Gregory Carvajal MD    Date: 2/17/2017  Time: 1:47 PM

## 2017-02-17 NOTE — PROGRESS NOTES
Acute Care - Physical Therapy Treatment Note  [facility]     Patient Name: Gregoria Jimenez  : 1934  MRN: [id number]  Today's Date: 2017  Onset of Illness/Injury or Date of Surgery Date: 02/15/17     Referring Physician: Alena    Admit Date: 2/15/2017    Visit Dx:    ICD-10-CM ICD-9-CM   1. Difficulty walking R26.2 719.7     Patient Active Problem List   Diagnosis   • Osteoarthritis of left knee               Adult Rehabilitation Note       17 0850 17 1310       Rehab Assessment/Intervention    Discipline physical therapy assistant  -KEYSHA physical therapy assistant  -KEYSHA     Document Type therapy note (daily note)  -KEYSHA therapy note (daily note)  -KEYSHA     Subjective Information agree to therapy  -KEYSAH agree to therapy  -KEYSHA     Patient Effort, Rehab Treatment adequate  -KEYSHA good  -KEYSHA     Precautions/Limitations fall precautions  -KEYSHA fall precautions  -KEYSHA     Recorded by [KEYSHA] Nic Parikh PTA [KEYSHA] Nic Parikh PTA     Pain Assessment    Pain Assessment 0-10  -KEYSHA 0-10  -KEYSHA     Pain Score 8  -KEYSHA 5  -KEYSHA     Pain Type Acute pain;Surgical pain  -KEYSHA Acute pain;Surgical pain  -KEYSHA     Pain Location Knee  -KEYSHA Knee  -KEYSHA     Pain Orientation Left  -KEYSHA Left  -KEYSHA     Pain Intervention(s) Ambulation/increased activity;Repositioned;Rest  -KEYSHA Ambulation/increased activity;Repositioned;Rest  -KEYSHA     Response to Interventions tolerated  -KEYSHA tolerated  -KEYSHA     Recorded by [KEYSHA] Nic Parikh PTA [KEYSHA] Nic Parikh PTA     Cognitive Assessment/Intervention    Current Cognitive/Communication Assessment impaired  -KEYSHA impaired  -KEYSHA     Orientation Status oriented to;person;situation  -KEYSHA oriented to;person;situation  -KEYSHA     Follows Commands/Answers Questions able to follow single-step instructions;100% of the time  -KEYSHA 75% of the time;able to follow single-step instructions  -KEYSHA     Personal Safety mild impairment  -KEYSHA mild impairment  -KEYSHA     Personal Safety Interventions fall prevention program maintained;gait belt;nonskid  shoes/slippers when out of bed  -KEYSHA fall prevention program maintained;gait belt;nonskid shoes/slippers when out of bed  -KEYSHA     Recorded by [KEYSHA] Nic Parikh PTA [KEYSHA] Nic Parikh PTA     ROM (Range of Motion)    General ROM lower extremity range of motion deficits identified  -KEYSHA lower extremity range of motion deficits identified  -KEYSHA     Recorded by [KEYSHA] Nic Parikh PTA [KEYSHA] Nic Parikh PTA     General LE Assessment    ROM knee, left: LE ROM deficit  -KEYSHA knee, left: LE ROM deficit  -KEYSHA     ROM Detail 24-85'  -KEYSHA 26-82'  -KEYSHA     Recorded by [KEYSHA] Nic Parikh PTA [KEYSHA] Nic Parikh PTA     Bed Mobility, Assessment/Treatment    Bed Mob, Supine to Sit, [place] not tested  -KEYSHA not tested  -KEYSHA     Bed Mob, Sit to Supine, [place] not tested  -KEYSHA not tested  -KEYSHA     Recorded by [KEYSHA] Nic Parikh PTA [KEYSHA] Nic Parikh PTA     Transfer Assessment/Treatment    Transfers, Sit-Stand [place] contact guard assist;verbal cues required  -KEYSHA contact guard assist;verbal cues required  -KEYSHA     Transfers, Stand-Sit [place] contact guard assist;verbal cues required  -KEYSHA contact guard assist;verbal cues required  -KEYSHA     Transfers, Sit-Stand-Sit, Assist Device rolling walker  -KEYSHA rolling walker  -KEYSHA     Transfer, Impairments strength decreased;impaired balance;pain;ROM decreased  -KEYSHA strength decreased;impaired balance;pain;ROM decreased  -KEYSHA     Recorded by [KEYSHA] Nic Parikh PTA [KEYSHA] Nic Parikh PTA     Gait Assessment/Treatment    Gait, [place] Level minimum assist (75% patient effort);verbal cues required;nonverbal cues required (demo/gesture)  -KEYSHA minimum assist (75% patient effort);verbal cues required;nonverbal cues required (demo/gesture)  -KEYSHA     Gait, Assistive Device rolling walker  -KEYSHA rolling walker  -KEYSHA     Gait, Distance (Feet) 50  -KEYSHA 45  -KEYSHA     Gait, Gait Deviations forward flexed posture;marcos decreased;left:;antalgic;decreased heel strike;step length decreased;stride length  decreased;swing-to-stance ratio decreased;toe-to-floor clearance decreased;weight-shifting ability decreased  -KEYSHA forward flexed posture;marcos decreased;left:;antalgic;decreased heel strike;step length decreased;stride length decreased;swing-to-stance ratio decreased;toe-to-floor clearance decreased;weight-shifting ability decreased  -KEYSHA     Gait, Safety Issues step length decreased  -KEYSHA step length decreased  -KEYSHA     Gait, Impairments strength decreased;impaired balance;ROM decreased;pain  -KEYSHA strength decreased;impaired balance;ROM decreased;pain  -KEYSHA     Recorded by [KEYSHA] Nic Parikh PTA [KEYSHA] Nic Parikh PTA     Therapy Exercises    Exercise Protocols total knee  -KEYSHA total knee  -KEYSHA     Total Knee Exercises left:;20 reps;completed protocol  -KEYSHA left:;15 reps;completed protocol  -KEYSHA     Recorded by [KEYSHA] Nic Parikh PTA [KEYSHA] Nic Parikh PTA     Positioning and Restraints    Pre-Treatment Position sitting in chair/recliner  -KEYSHA sitting in chair/recliner  -KEYSHA     Post Treatment Position chair  -KEYSHA chair  -KEYSHA     In Chair reclined;call light within reach;encouraged to call for assist  -KEYSHA reclined;call light within reach;encouraged to call for assist  -KEYSHA     Recorded by [KEYSHA] Nic Parikh PTA [KEYSHA] Nic Parikh PTA       User Key  (r) = Recorded By, (t) = Taken By, (c) = Cosigned By    Initials Name Effective Dates    KEYSHA Parikh PTA 04/24/15 -                 IP PT Goals       02/16/17 1003          Transfer Training PT LTG    Transfer Training PT LTG, Date Established 02/16/17  -EE      Transfer Training PT LTG, Time to Achieve 3 days  -EE      Transfer Training PT LTG, Activity Type all transfers  -EE      Transfer Training PT LTG, [place] Level supervision required  -EE      Transfer Training PT LTG, Assist Device walker, standard  -EE      Gait Training PT LTG    Gait Training Goal PT LTG, Date Established 02/16/17  -EE      Gait Training Goal PT LTG, Time to Achieve 3 days  -EE      Gait Training  Goal PT LTG, [place] Level contact guard assist  -EE      Gait Training Goal PT LTG, Assist Device walker, standard  -EE      Gait Training Goal PT LTG, Distance to Achieve 75  -EE      Range of Motion PT LTG    Range of Motion Goal PT LTG, Date Established 02/16/17  -EE      Range of Motion Goal PT LTG, Time to Achieve 3 days  -EE      Range fo Motion Goal PT LTG, Joint L knee  -EE      Range of Motion Goal PT LTG, AROM Measure 0-90  -EE      Patient Education PT LTG    Patient Education PT LTG, Date Established 02/16/17  -EE      Patient Education PT LTG, Time to Achieve 3 days  -EE      Patient Education PT LTG, Education Type HEP  -EE      Patient Education PT LTG, Education Understanding demonstrate adequately;verbalize understanding  -EE        User Key  (r) = Recorded By, (t) = Taken By, (c) = Cosigned By    Initials Name Provider Type    EE Zayra Niño, PT Physical Therapist          Physical Therapy Education     Title: PT OT SLP Therapies (Done)     Topic: Physical Therapy (Done)     Point: Mobility training (Done)    Learning Progress Summary    Learner Readiness Method Response Comment Documented by Status   Patient Acceptance E VU  KEYSHA 02/17/17 1155 Done    Acceptance E VU  KEYSHA 02/16/17 1541 Done    Acceptance E,TB VU,NR  EE 02/16/17 1003 Done               Point: Home exercise program (Done)    Learning Progress Summary    Learner Readiness Method Response Comment Documented by Status   Patient Acceptance E VU  KEYSHA 02/17/17 1155 Done    Acceptance E VU  KEYSHA 02/16/17 1541 Done    Acceptance E,TB VU,NR  EE 02/16/17 1003 Done               Point: Body mechanics (Done)    Learning Progress Summary    Learner Readiness Method Response Comment Documented by Status   Patient Acceptance E VU  KEYSHA 02/17/17 1155 Done    Acceptance E VU  KEYSHA 02/16/17 1541 Done    Acceptance E,TB VU,NR  EE 02/16/17 1003 Done                      User Key     Initials Effective Dates Name Provider Type Discipline    EE 12/01/15 -   Zayra Niño, PT Physical Therapist PT    KEYSHA 04/24/15 -  Nic Parikh PTA Physical Therapy Assistant PT                    PT Recommendation and Plan  Anticipated Discharge Disposition: skilled nursing facility  Planned Therapy Interventions: balance training, bed mobility training, gait training, home exercise program, patient/family education, ROM (Range of Motion), strengthening, stretching, transfer training  PT Frequency: 2 times/day  Plan of Care Review  Plan Of Care Reviewed With: patient  Outcome Summary/Follow up Plan: Pt with improved functional mobility as expected. Pt educated on proper long-term positioning to facilitate improved (L)knee ext.  Continues to require cuing for proper sequencing during gait.            Outcome Measures       02/17/17 1100 02/16/17 1500 02/16/17 1000    How much help from another person do you currently need...    Turning from your back to your side while in flat bed without using bedrails? 3  -KEYSHA 3  -KEYSHA 3  -EE    Moving from lying on back to sitting on the side of a flat bed without bedrails? 3  -KEYSHA 3  -KEYSHA 3  -EE    Moving to and from a bed to a chair (including a wheelchair)? 3  -KEYSHA 3  -KEYSHA 3  -EE    Standing up from a chair using your arms (e.g., wheelchair, bedside chair)? 3  -KEYSHA 3  -KEYSHA 3  -EE    Climbing 3-5 steps with a railing? 2  -KEYSHA 2  -KEYSHA 2  -EE    To walk in hospital room? 3  -KEYSHA 3  -KEYSHA 3  -EE    AM-PAC 6 Clicks Score 17  -KEYSHA 17  -KEYSHA 17  -EE    Functional Assessment    Outcome Measure Options AM-PAC 6 Clicks Basic Mobility (PT)  -KEYSHA AM-PAC 6 Clicks Basic Mobility (PT)  -KEYSHA AM-PAC 6 Clicks Basic Mobility (PT)  -EE      User Key  (r) = Recorded By, (t) = Taken By, (c) = Cosigned By    Initials Name Provider Type    EE Zayra Niño, RONIT Physical Therapist    KEYSHA Parikh PTA Physical Therapy Assistant           Time Calculation:         PT Charges       02/17/17 1154          Time Calculation    Start Time 0850  -KEYSHA      Stop Time 0935  -KEYSHA      Time Calculation (min)  45 min  -KEYSHA      PT Received On 02/17/17  -KEYSHA      PT - Next Appointment 02/17/17  -KEYSHA        User Key  (r) = Recorded By, (t) = Taken By, (c) = Cosigned By    Initials Name Provider Type    KEYSHA Parikh PTA Physical Therapy Assistant          Therapy Charges for Today     Code Description Service Date Service Provider Modifiers Qty    82317245350 HC PT THER PROC GROUP 2/16/2017 Nic Parikh PTA GP 1    32989188185 HC PT THER PROC EA 15 MIN 2/16/2017 Nic Parikh PTA GP 1    22124270894 HC PT THER PROC GROUP 2/17/2017 Nic Parikh PTA GP 1    10993079515 HC PT THER PROC EA 15 MIN 2/17/2017 Nic Parikh PTA GP 1          PT G-Codes  Outcome Measure Options: AM-PAC 6 Clicks Basic Mobility (PT)    Nic Parikh PTA  2/17/2017

## 2017-02-17 NOTE — CONSULTS
"    Name: Gregoria Jimenez ADMIT: 2/15/2017   : 1934  PCP: Rosanne Bland MD    MRN: [id number] LOS: 1 days   AGE/SEX: 83 y.o. female  ROOM: [facility]           Inpatient Consult to Internal Medicine  Consult performed by: PADMINI JUARES  Consult ordered by: JAZ BBOO      Date of Admit: 2/15/2017  Date of Consult: 17    Subjective   History of Present Illness  84 yo WF admitted postoperatively on 2/15/2016 after undergoing left TKA. Apparently she became very confused postoperatively & consult was requested early this am. Unfortunately there was some breakdown in communication & I was not told that his was a new consult, so I didn't get around to see her until this evening. As it turns out though, her mental status appears to have improved throughout the day.  The pt told me that she has been doing well except that she has \"been having wild dreams\".  Denies any nausea, reports she only has pain when she tries to stand on her leg. Denies any SOA, CP      Past Medical History   Diagnosis Date   • Arthritis    • History of bronchiectasis         • Hypertension    • Neuropathy    • Parkinson disease      Past Surgical History   Procedure Laterality Date   • Eye surgery     • Knee arthroscopy       History reviewed. No pertinent family history.  Social History   Substance Use Topics   • Smoking status: Never Smoker   • Smokeless tobacco: Never Used   • Alcohol use No     Prescriptions Prior to Admission   Medication Sig Dispense Refill Last Dose   • aspirin 81 MG EC tablet Take 81 mg by mouth Daily. TO STOP 1 WEEK BEFORE SURGERY   2017   • bisoprolol-hydrochlorothiazide (ZIAC) 5-6.25 MG per tablet Take 1 tablet by mouth Every Morning.   2/15/2017 at 0730   • carbidopa-levodopa (SINEMET)  MG per tablet Take 1.5 tablets by mouth 3 (Three) Times a Day.   2/15/2017 at 0730   • meloxicam (MOBIC) 15 MG tablet Take 15 mg by mouth Daily. TO STOP 1 WEEK BEFORE SURGERY   2017   • " mupirocin (BACTROBAN) 2 % nasal ointment into each nostril. As directed pre op   2/15/2017 at 0730   • Nebulizer misc As Needed.   2/14/2017 at 1800   • traMADol (ULTRAM) 50 MG tablet Take 50 mg by mouth Every 6 (Six) Hours As Needed for moderate pain (4-6).   2/13/2017   • Chlorhexidine Gluconate Cloth 2 % pads Apply  topically. As directed pre op   2/15/2017 at 0600     Allergies:  Sulfa antibiotics    Review of Systems  As mentioned in HPI. Further ROS not reliable      Objective      Vital Signs  Temp:  [97 °F (36.1 °C)-99 °F (37.2 °C)] 98 °F (36.7 °C)  Heart Rate:  [68-80] 80  Resp:  [16-18] 18  BP: (134-164)/(55-72) 140/64  Body mass index is 21.19 kg/(m^2).    Physical Exam  Elderly WF in NAD  Skin warm & dry. Speech fluent although slow  PERRLA, EOMI, sclera non-icteric. Oropharynx dry, multiple teeth missing but otherwise benign.  Neck supple without adenopathy or thryromegaly  Lungs clear  Heart RRR  Abd soft, NT, BS+  Ext no edema       Results Review:    I reviewed the patient's new clinical results.      Assessment/Plan     Active Problems:    Osteoarthritis of left knee      Assessment & Plan    1. HTN  2. Parkinsons disease  3. Postop delirium/toxic encephalopathy secondary to anesthesia & meds. This appears to be clearing on it's own. There may be some baseline dementia present related to her Parkinson's disease that has worsened with the surgery but she has improved quickly. For now I'll just plan to monitor how she does.    Thank you very much for asking A to be involved in this patient's care.  We will follow along with you.      Becca Cuevas MD  [facility]  02/16/17  8:14 PM

## 2017-02-17 NOTE — PLAN OF CARE
Problem: Patient Care Overview (Adult)  Goal: Plan of Care Review  Outcome: Ongoing (interventions implemented as appropriate)    02/17/17 0423   Coping/Psychosocial Response Interventions   Plan Of Care Reviewed With patient   Outcome Evaluation   Outcome Summary/Follow up Plan Pt with improved functional mobility as expected. Pt educated on proper long-term positioning to facilitate improved (L)knee ext. Continues to require cuing for proper sequencing during gait.

## 2017-02-17 NOTE — PROGRESS NOTES
Acute Care - Physical Therapy Treatment Note  [facility]     Patient Name: Gregoria Jimenez  : 1934  MRN: [id number]  Today's Date: 2017  Onset of Illness/Injury or Date of Surgery Date: 02/15/17     Referring Physician: Alena    Admit Date: 2/15/2017    Visit Dx:    ICD-10-CM ICD-9-CM   1. Difficulty walking R26.2 719.7     Patient Active Problem List   Diagnosis   • Osteoarthritis of left knee               Adult Rehabilitation Note       17 1300 17 0850 17 1310    Rehab Assessment/Intervention    Discipline physical therapy assistant  -KEYSHA physical therapy assistant  -KEYSHA physical therapy assistant  -KEYSHA    Document Type therapy note (daily note)  -KEYSHA therapy note (daily note)  -KEYSHA therapy note (daily note)  -KEYSHA    Subjective Information agree to therapy  -KEYSHA agree to therapy  -KEYSHA agree to therapy  -KEYSHA    Patient Effort, Rehab Treatment adequate  -KEYSHA adequate  -KEYSHA good  -KEYSHA    Precautions/Limitations fall precautions  -KEYSHA fall precautions  -KEYSHA fall precautions  -KEYSHA    Recorded by [KEYSHA] Nic Parikh PTA [KEYSHA] Nic Parikh PTA [KEYSHA] Nic Parikh PTA    Pain Assessment    Pain Assessment 0-10  -KEYSHA 0-10  -KEYSHA 0-10  -KEYSHA    Pain Score 0  -KEYSHA 8  -KEYSHA 5  -KEYSHA    Post Pain Score 3  -KEYSHA      Pain Type Acute pain;Surgical pain  -KEYSHA Acute pain;Surgical pain  -KEYSHA Acute pain;Surgical pain  -KEYSHA    Pain Location Knee  -KEYSHA Knee  -KEYSHA Knee  -KEYSHA    Pain Orientation Left  -KEYSHA Left  -KEYSHA Left  -KEYSHA    Pain Intervention(s) Ambulation/increased activity;Repositioned;Rest  -KEYSHA Ambulation/increased activity;Repositioned;Rest  -KEYSHA Ambulation/increased activity;Repositioned;Rest  -KEYSHA    Response to Interventions tolerated  -KEYSHA tolerated  -KEYSHA tolerated  -KEYSHA    Recorded by [KEYSHA] Nic Parikh PTA [KEYSHA] Nic Parikh PTA [KEYSHA] Nic Parikh PTA    Cognitive Assessment/Intervention    Current Cognitive/Communication Assessment impaired  -KEYSHA impaired  -KEYSHA impaired  -KEYSHA    Orientation Status oriented to;person;situation;place  -KEYSHA  oriented to;person;situation  -KEYSHA oriented to;person;situation  -KEYSHA    Follows Commands/Answers Questions 100% of the time;able to follow single-step instructions  -KEYSHA able to follow single-step instructions;100% of the time  -KEYSHA 75% of the time;able to follow single-step instructions  -KEYSHA    Personal Safety mild impairment  -KEYSHA mild impairment  -KEYSHA mild impairment  -KEYSHA    Personal Safety Interventions fall prevention program maintained;gait belt;nonskid shoes/slippers when out of bed  -KEYSHA fall prevention program maintained;gait belt;nonskid shoes/slippers when out of bed  -KEYSHA fall prevention program maintained;gait belt;nonskid shoes/slippers when out of bed  -KEYSHA    Recorded by [KEYSHA] Nic Parikh PTA [KEYSHA] Nic Parikh PTA [KEYSHA] Nic Parikh PTA    ROM (Range of Motion)    General ROM  lower extremity range of motion deficits identified  -KEYSHA lower extremity range of motion deficits identified  -KEYSHA    Recorded by  [KEYSHA] Nic Parikh PTA [KEYSHA] Nic Parikh PTA    General LE Assessment    ROM  knee, left: LE ROM deficit  -KEYSHA knee, left: LE ROM deficit  -KEYSHA    ROM Detail  24-85'  -KEYSHA 26-82'  -KEYSHA    Recorded by  [KEYSHA] Nic Parikh PTA [KEYSHA] Nic Parikh PTA    Bed Mobility, Assessment/Treatment    Bed Mob, Supine to Sit, [place] not tested  -KEYSHA not tested  -KEYSHA not tested  -KEYSHA    Bed Mob, Sit to Supine, [place] not tested  -KEYSHA not tested  -KEYSHA not tested  -KEYSHA    Recorded by [KEYSHA] Nic Parikh PTA [KEYSHA] Nic Parikh PTA [KEYSHA] Nic Parikh PTA    Transfer Assessment/Treatment    Transfers, Sit-Stand [place] contact guard assist;verbal cues required  -KEYSHA contact guard assist;verbal cues required  -KEYSHA contact guard assist;verbal cues required  -KEYSHA    Transfers, Stand-Sit [place] contact guard assist;verbal cues required  -KEYSHA contact guard assist;verbal cues required  -KEYSHA contact guard assist;verbal cues required  -KEYSHA    Transfers, Sit-Stand-Sit, Assist Device rolling walker  -KEYSHA rolling walker  -KEYSHA rolling walker   -KEYSHA    Transfer, Impairments strength decreased;impaired balance;pain;ROM decreased  -KEYSHA strength decreased;impaired balance;pain;ROM decreased  -KEYSHA strength decreased;impaired balance;pain;ROM decreased  -KEYSHA    Recorded by [KEYSHA] Nci Parikh PTA [KEYSHA] Nic Parikh PTA [KEYSHA] Nic Parikh PTA    Gait Assessment/Treatment    Gait, [place] Level minimum assist (75% patient effort);verbal cues required;nonverbal cues required (demo/gesture)  -KEYSHA minimum assist (75% patient effort);verbal cues required;nonverbal cues required (demo/gesture)  -KEYSHA minimum assist (75% patient effort);verbal cues required;nonverbal cues required (demo/gesture)  -KEYSHA    Gait, Assistive Device rolling walker  -KEYSHA rolling walker  -KEYSHA rolling walker  -KEYSHA    Gait, Distance (Feet) 56  -KEYSHA 50  -KEYSHA 45  -KEYSHA    Gait, Gait Deviations forward flexed posture;m[place] decreased;left:;antalgic;decreased heel strike;step length decreased;stride length decreased;swing-to-stance ratio decreased;toe-to-floor clearance decreased;weight-shifting ability decreased  -KEYSHA forward flexed posture;m[place] decreased;left:;antalgic;decreased heel strike;step length decreased;stride length decreased;swing-to-stance ratio decreased;toe-to-floor clearance decreased;weight-shifting ability decreased  -KEYSHA forward flexed posture;m[place] decreased;left:;antalgic;decreased heel strike;step length decreased;stride length decreased;swing-to-stance ratio decreased;toe-to-floor clearance decreased;weight-shifting ability decreased  -KEYSHA    Gait, Safety Issues step length decreased  -KEYSHA step length decreased  -KEYSHA step length decreased  -KESYHA    Gait, Impairments strength decreased;impaired balance;ROM decreased;pain  -KEYSHA strength decreased;impaired balance;ROM decreased;pain  -KEYSHA strength decreased;impaired balance;ROM decreased;pain  -KEYSHA    Recorded by [KEYSHA] Nic Parikh PTA [KEYSHA] Nic Parikh PTA [KEYSHA] Nic Parikh PTA    Therapy Exercises    Exercise Protocols total knee  -KEYSHA total knee   -KEYSHA total knee  -KEYSHA    Total Knee Exercises left:;completed protocol;25 reps  -KEYSHA left:;20 reps;completed protocol  -KEYSHA left:;15 reps;completed protocol  -KEYSHA    Recorded by [KEYSHA] Nic Parikh PTA [KEYSHA] Nic Parikh PTA [KEYSHA] Nic Parikh PTA    Positioning and Restraints    Pre-Treatment Position sitting in chair/recliner  -KEYSHA sitting in chair/recliner  -KEYSHA sitting in chair/recliner  -KEYSHA    Post Treatment Position chair  -KEYSHA chair  -KEYSHA chair  -KEYSHA    In Chair reclined;call light within reach;encouraged to call for assist  -KEYSHA reclined;call light within reach;encouraged to call for assist  -KEYSHA reclined;call light within reach;encouraged to call for assist  -KEYSHA    Recorded by [KEYSHA] Nic Parikh PTA [KEYSHA] Nic Parikh PTA [KEYSHA] Nic Parikh PTA      User Key  (r) = Recorded By, (t) = Taken By, (c) = Cosigned By    Initials Name Effective Dates    KEYSHA Parikh PTA 04/24/15 -                 IP PT Goals       02/16/17 1003          Transfer Training PT LTG    Transfer Training PT LTG, Date Established 02/16/17  -EE      Transfer Training PT LTG, Time to Achieve 3 days  -EE      Transfer Training PT LTG, Activity Type all transfers  -EE      Transfer Training PT LTG, [place] Level supervision required  -EE      Transfer Training PT LTG, Assist Device walker, standard  -EE      Gait Training PT LTG    Gait Training Goal PT LTG, Date Established 02/16/17  -EE      Gait Training Goal PT LTG, Time to Achieve 3 days  -EE      Gait Training Goal PT LTG, [place] Level contact guard assist  -EE      Gait Training Goal PT LTG, Assist Device walker, standard  -EE      Gait Training Goal PT LTG, Distance to Achieve 75  -EE      Range of Motion PT LTG    Range of Motion Goal PT LTG, Date Established 02/16/17  -EE      Range of Motion Goal PT LTG, Time to Achieve 3 days  -EE      Range fo Motion Goal PT LTG, Joint L knee  -EE      Range of Motion Goal PT LTG, AROM Measure 0-90  -EE      Patient Education PT LTG    Patient  Education PT LTG, Date Established 02/16/17  -EE      Patient Education PT LTG, Time to Achieve 3 days  -EE      Patient Education PT LTG, Education Type HEP  -EE      Patient Education PT LTG, Education Understanding demonstrate adequately;verbalize understanding  -EE        User Key  (r) = Recorded By, (t) = Taken By, (c) = Cosigned By    Initials Name Provider Type    EE Zayra Niño, PT Physical Therapist          Physical Therapy Education     Title: PT OT SLP Therapies (Done)     Topic: Physical Therapy (Done)     Point: Mobility training (Done)    Learning Progress Summary    Learner Readiness Method Response Comment Documented by Status   Patient Acceptance E VU,NR  KEYSHA 02/17/17 1621 Done    Acceptance E VU  KEYSHA 02/17/17 1155 Done    Acceptance E VU  KEYSHA 02/16/17 1541 Done    Acceptance E,TB VU,NR  EE 02/16/17 1003 Done               Point: Home exercise program (Done)    Learning Progress Summary    Learner Readiness Method Response Comment Documented by Status   Patient Acceptance E VU,NR  KEYSHA 02/17/17 1621 Done    Acceptance E VU  KEYSHA 02/17/17 1155 Done    Acceptance E VU  KEYSHA 02/16/17 1541 Done    Acceptance E,TB VU,NR  EE 02/16/17 1003 Done               Point: Body mechanics (Done)    Learning Progress Summary    Learner Readiness Method Response Comment Documented by Status   Patient Acceptance E VU,NR  KEYSHA 02/17/17 1621 Done    Acceptance E VU  KEYSHA 02/17/17 1155 Done    Acceptance E VU  KEYSHA 02/16/17 1541 Done    Acceptance E,TB VU,NR  EE 02/16/17 1003 Done                      User Key     Initials Effective Dates Name Provider Type Discipline    EE 12/01/15 -  Zayra Niño, PT Physical Therapist PT    KEYSHA 04/24/15 -  Nic Parikh PTA Physical Therapy Assistant PT                    PT Recommendation and Plan  Anticipated Discharge Disposition: skilled nursing facility  Planned Therapy Interventions: balance training, bed mobility training, gait training, home exercise program, patient/family education, ROM  (Range of Motion), strengthening, stretching, transfer training  PT Frequency: 2 times/day  Plan of Care Review  Plan Of Care Reviewed With: patient  Outcome Summary/Follow up Plan: Pt with improved functional mobility as expected. Pt educated on proper long-term positioning to facilitate improved (L)knee ext.  Continues to require cuing for proper sequencing during gait.            Outcome Measures       02/17/17 1100 02/16/17 1500 02/16/17 1000    How much help from another person do you currently need...    Turning from your back to your side while in flat bed without using bedrails? 3  -KEYSHA 3  -KEYSHA 3  -EE    Moving from lying on back to sitting on the side of a flat bed without bedrails? 3  -KEYSHA 3  -KEYSHA 3  -EE    Moving to and from a bed to a chair (including a wheelchair)? 3  -KEYSHA 3  -KEYSHA 3  -EE    Standing up from a chair using your arms (e.g., wheelchair, bedside chair)? 3  -KEYSHA 3  -KEYSHA 3  -EE    Climbing 3-5 steps with a railing? 2  -KEYSHA 2  -KEYSHA 2  -EE    To walk in hospital room? 3  -KEYSHA 3  -KEYSHA 3  -EE    AM-PAC 6 Clicks Score 17  -KEYSHA 17  -KEYSHA 17  -EE    Functional Assessment    Outcome Measure Options AM-PAC 6 Clicks Basic Mobility (PT)  -KEYSHA AM-PAC 6 Clicks Basic Mobility (PT)  -KEYSHA AM-PAC 6 Clicks Basic Mobility (PT)  -EE      User Key  (r) = Recorded By, (t) = Taken By, (c) = Cosigned By    Initials Name Provider Type    SALVADOR Niño, PT Physical Therapist    KEYSHA Parikh PTA Physical Therapy Assistant           Time Calculation:         PT Charges       02/17/17 1621 02/17/17 1154       Time Calculation    Start Time 1300  -KEYSHA 0850  -KEYSHA     Stop Time 1349  -KEYSHA 0935  -KEYSHA     Time Calculation (min) 49 min  -KEYSHA 45 min  -KEYSHA     PT Received On 02/17/17  -KEYSHA 02/17/17  -KEYSHA     PT - Next Appointment 02/18/17  -KEYSHA 02/17/17  -KEYSHA       User Key  (r) = Recorded By, (t) = Taken By, (c) = Cosigned By    Initials Name Provider Type    KEYSHA Parikh PTA Physical Therapy Assistant          Therapy Charges for Today     Code  Description Service Date Service Provider Modifiers Qty    41369898654 HC PT THER PROC GROUP 2/16/2017 Nic Parikh, PTA GP 1    00735574839 HC PT THER PROC EA 15 MIN 2/16/2017 Nic Parikh, PTA GP 1    65877493801 HC PT THER PROC GROUP 2/17/2017 Nic Parikh, PTA GP 1    89925361777 HC PT THER PROC EA 15 MIN 2/17/2017 Nic Parikh, PTA GP 1    93248404703 HC PT THER PROC EA 15 MIN 2/17/2017 Nic Parikh, PTA GP 1    22703229583 HC PT THER PROC GROUP 2/17/2017 Nic Parikh, PTA GP 1          PT G-Codes  Outcome Measure Options: AM-PAC 6 Clicks Basic Mobility (PT)    Nic Parikh PTA  2/17/2017

## 2017-02-17 NOTE — PROGRESS NOTES
Discharge Planning Assessment  [facility]     Patient Name: Gregoria Jimenez  MRN: [id number]  Today's Date: 2/17/2017    Admit Date: 2/15/2017          Discharge Needs Assessment     None            Discharge Plan       02/17/17 1855    Case Management/Social Work Plan    Plan [facility] Monday 2/20    Additional Comments Pt will have a bed at [facility] on Monday 2/20, Packet in [facility] office.         Discharge Placement     Facility/Agency Request Status Selected? Address Phone Number Fax Number    [facility] Accepted    Yes [address] [facility] [phone]        Annie Marie RN 2/17/2017 18:55    2/16/2017  Janae to check bed status.Annie Marie RN  2/17/2017  Bed available Monday per Janae.Annie Marie, STACEY                             Demographic Summary     None            Functional Status     None            Psychosocial     None            Abuse/Neglect     None            Legal     None            Substance Abuse     None            Patient Forms     None          Annie Marie RN

## 2017-02-18 LAB
ANION GAP SERPL CALCULATED.3IONS-SCNC: 11.8 MMOL/L
BASOPHILS # BLD AUTO: 0.01 10*3/MM3 (ref 0–0.2)
BASOPHILS NFR BLD AUTO: 0.1 % (ref 0–1.5)
BUN BLD-MCNC: 19 MG/DL (ref 8–23)
BUN/CREAT SERPL: 25 (ref 7–25)
CALCIUM SPEC-SCNC: 8.7 MG/DL (ref 8.6–10.5)
CHLORIDE SERPL-SCNC: 93 MMOL/L (ref 98–107)
CO2 SERPL-SCNC: 28.2 MMOL/L (ref 22–29)
CREAT BLD-MCNC: 0.76 MG/DL (ref 0.57–1)
DEPRECATED RDW RBC AUTO: 43.1 FL (ref 37–54)
EOSINOPHIL # BLD AUTO: 0.1 10*3/MM3 (ref 0–0.7)
EOSINOPHIL NFR BLD AUTO: 0.9 % (ref 0.3–6.2)
ERYTHROCYTE [DISTWIDTH] IN BLOOD BY AUTOMATED COUNT: 13.3 % (ref 11.7–13)
GFR SERPL CREATININE-BSD FRML MDRD: 73 ML/MIN/1.73
GLUCOSE BLD-MCNC: 119 MG/DL (ref 65–99)
HCT VFR BLD AUTO: 27 % (ref 35.6–45.5)
HGB BLD-MCNC: 9.3 G/DL (ref 11.9–15.5)
IMM GRANULOCYTES # BLD: 0.03 10*3/MM3 (ref 0–0.03)
IMM GRANULOCYTES NFR BLD: 0.3 % (ref 0–0.5)
LYMPHOCYTES # BLD AUTO: 2.23 10*3/MM3 (ref 0.9–4.8)
LYMPHOCYTES NFR BLD AUTO: 20.8 % (ref 19.6–45.3)
MCH RBC QN AUTO: 30.2 PG (ref 26.9–32)
MCHC RBC AUTO-ENTMCNC: 34.4 G/DL (ref 32.4–36.3)
MCV RBC AUTO: 87.7 FL (ref 80.5–98.2)
MONOCYTES # BLD AUTO: 1.1 10*3/MM3 (ref 0.2–1.2)
MONOCYTES NFR BLD AUTO: 10.3 % (ref 5–12)
NEUTROPHILS # BLD AUTO: 7.24 10*3/MM3 (ref 1.9–8.1)
NEUTROPHILS NFR BLD AUTO: 67.6 % (ref 42.7–76)
PLATELET # BLD AUTO: 168 10*3/MM3 (ref 140–500)
PMV BLD AUTO: 10.6 FL (ref 6–12)
POTASSIUM BLD-SCNC: 2.9 MMOL/L (ref 3.5–5.2)
POTASSIUM BLD-SCNC: 4.3 MMOL/L (ref 3.5–5.2)
RBC # BLD AUTO: 3.08 10*6/MM3 (ref 3.9–5.2)
SODIUM BLD-SCNC: 133 MMOL/L (ref 136–145)
WBC NRBC COR # BLD: 10.71 10*3/MM3 (ref 4.5–10.7)

## 2017-02-18 PROCEDURE — 25010000002 ENOXAPARIN PER 10 MG: Performed by: ORTHOPAEDIC SURGERY

## 2017-02-18 PROCEDURE — 84132 ASSAY OF SERUM POTASSIUM: CPT | Performed by: ORTHOPAEDIC SURGERY

## 2017-02-18 PROCEDURE — 80048 BASIC METABOLIC PNL TOTAL CA: CPT | Performed by: INTERNAL MEDICINE

## 2017-02-18 PROCEDURE — 97150 GROUP THERAPEUTIC PROCEDURES: CPT

## 2017-02-18 PROCEDURE — 97110 THERAPEUTIC EXERCISES: CPT

## 2017-02-18 PROCEDURE — 85025 COMPLETE CBC W/AUTO DIFF WBC: CPT | Performed by: INTERNAL MEDICINE

## 2017-02-18 RX ORDER — POTASSIUM CHLORIDE 7.45 MG/ML
10 INJECTION INTRAVENOUS
Status: DISCONTINUED | OUTPATIENT
Start: 2017-02-18 | End: 2017-02-20 | Stop reason: HOSPADM

## 2017-02-18 RX ORDER — POTASSIUM CHLORIDE 750 MG/1
40 CAPSULE, EXTENDED RELEASE ORAL AS NEEDED
Status: DISCONTINUED | OUTPATIENT
Start: 2017-02-18 | End: 2017-02-20 | Stop reason: HOSPADM

## 2017-02-18 RX ORDER — POTASSIUM CHLORIDE 1.5 G/1.77G
40 POWDER, FOR SOLUTION ORAL AS NEEDED
Status: DISCONTINUED | OUTPATIENT
Start: 2017-02-18 | End: 2017-02-20 | Stop reason: HOSPADM

## 2017-02-18 RX ADMIN — POTASSIUM CHLORIDE 40 MEQ: 750 CAPSULE, EXTENDED RELEASE ORAL at 08:37

## 2017-02-18 RX ADMIN — ACETAMINOPHEN 650 MG: 325 TABLET ORAL at 14:37

## 2017-02-18 RX ADMIN — ACETAMINOPHEN 650 MG: 325 TABLET ORAL at 06:14

## 2017-02-18 RX ADMIN — CARBIDOPA AND LEVODOPA 1.5 TABLET: 25; 100 TABLET ORAL at 16:23

## 2017-02-18 RX ADMIN — BISOPROLOL FUMARATE AND HYDROCHLOROTHIAZIDE 1 TABLET: 6.25; 5 TABLET ORAL at 08:36

## 2017-02-18 RX ADMIN — ACETAMINOPHEN 650 MG: 325 TABLET ORAL at 23:40

## 2017-02-18 RX ADMIN — CARBIDOPA AND LEVODOPA 1.5 TABLET: 25; 100 TABLET ORAL at 21:10

## 2017-02-18 RX ADMIN — ACETAMINOPHEN 650 MG: 325 TABLET ORAL at 10:38

## 2017-02-18 RX ADMIN — POTASSIUM CHLORIDE 40 MEQ: 750 CAPSULE, EXTENDED RELEASE ORAL at 13:35

## 2017-02-18 RX ADMIN — ACETAMINOPHEN 650 MG: 325 TABLET ORAL at 18:43

## 2017-02-18 RX ADMIN — ENOXAPARIN SODIUM 40 MG: 40 INJECTION SUBCUTANEOUS at 08:36

## 2017-02-18 RX ADMIN — DOCUSATE SODIUM -SENNOSIDES 2 TABLET: 50; 8.6 TABLET, COATED ORAL at 08:36

## 2017-02-18 RX ADMIN — CARBIDOPA AND LEVODOPA 1.5 TABLET: 25; 100 TABLET ORAL at 08:36

## 2017-02-18 RX ADMIN — POTASSIUM CHLORIDE 40 MEQ: 750 CAPSULE, EXTENDED RELEASE ORAL at 17:44

## 2017-02-18 RX ADMIN — MAGNESIUM HYDROXIDE 10 ML: 2400 SUSPENSION ORAL at 08:37

## 2017-02-18 NOTE — PROGRESS NOTES
Procedure(s):  LT TOTAL KNEE ARTHROPLASTY     LOS: 3 days     Subjective :   Complains of pain. PT is going well. Not sleeping well. Ready to go to rehab facility. Bed won't be ready until Monday.     Objective :    Vital signs in last 24 hours:  Vitals:    02/17/17 1900 02/17/17 2300 02/18/17 0300 02/18/17 0702   BP: 120/50 134/54 136/85 147/61   BP Location: Left arm Left arm Left arm Left arm   Patient Position: Sitting Lying Lying Lying   Pulse: 69 67  65   Resp: 20 24 16 16   Temp: 97.4 °F (36.3 °C) 99.3 °F (37.4 °C) 98 °F (36.7 °C) 98.7 °F (37.1 °C)   TempSrc: Oral Oral Oral Oral   SpO2: 94% 95% 92% 95%   Weight:       Height:           PHYSICAL EXAM:  Patient is calm, in no acute distress, awake and oriented x 3.  Dressing is clean, dry and intact.  No signs of infection.  Swelling is appropriate in amount.  Ecchymosis is appropriate in amount.  Homans test is negative.  Patient is neurovascularly intact distally.    LABS:    Results from last 7 days  Lab Units 02/18/17  0314   WBC 10*3/mm3 10.71*   HEMOGLOBIN g/dL 9.3*   HEMATOCRIT % 27.0*   PLATELETS 10*3/mm3 168       Results from last 7 days  Lab Units 02/18/17  0314   SODIUM mmol/L 133*   POTASSIUM mmol/L 2.9*   CHLORIDE mmol/L 93*   TOTAL CO2 mmol/L 28.2   BUN mg/dL 19   CREATININE mg/dL 0.76   GLUCOSE mg/dL 119*   CALCIUM mg/dL 8.7           ASSESSMENT:  Status post Procedure(s):  LT TOTAL KNEE ARTHROPLASTY      Plan:  Continue Physical Therapy, increase mobility and range of motion as tolerated.  Continue SCDs, Continue Lovenox for DVT prophylaxis while inpatient.  Dispo planning for rehab facility. Bed will not be ready until Monday. Placed on potassium protocol.     Solange Murillo PA-C    Date: 2/18/2017  Time: 8:14 AM

## 2017-02-18 NOTE — PROGRESS NOTES
Acute Care - Physical Therapy Treatment Note  [facility]     Patient Name: Gregoria Jimenez  : 1934  MRN: [id number]  Today's Date: 2017  Onset of Illness/Injury or Date of Surgery Date: 02/15/17     Referring Physician: Alena    Admit Date: 2/15/2017    Visit Dx:    ICD-10-CM ICD-9-CM   1. Difficulty walking R26.2 719.7     Patient Active Problem List   Diagnosis   • Osteoarthritis of left knee               Adult Rehabilitation Note       17 0930 17 1300 17 0850    Rehab Assessment/Intervention    Discipline physical therapist  -SUZANNE physical therapy assistant  -KEYSHA physical therapy assistant  -KEYSHA    Document Type therapy note (daily note)  -SUZANNE therapy note (daily note)  -KEYSHA therapy note (daily note)  -KEYSHA    Subjective Information agree to therapy  -SUZANNE agree to therapy  -KEYSHA agree to therapy  -KEYSHA    Patient Effort, Rehab Treatment good  -SUZANNE adequate  -KEYSHA adequate  -KEYSHA    Precautions/Limitations  fall precautions  -KEYSHA fall precautions  -KEYSHA    Recorded by [SUZANNE] Mgean Howard, PT [KEYSHA] Nic Parikh, PTA [KEYSHA] Nic Parikh, XAVIER    Pain Assessment    Pain Assessment 0-10  -SUZANNE 0-10  -KEYSHA 0-10  -KEYSHA    Pain Score 3  -SUZANNE 0  -KEYSHA 8  -KEYSHA    Post Pain Score  3  -KEYSHA     Pain Type  Acute pain;Surgical pain  -KEYSHA Acute pain;Surgical pain  -KEYSHA    Pain Location Knee  -SUZANNE Knee  -KEYSHA Knee  -KEYSHA    Pain Orientation Left  -SUZANNE Left  -KEYSHA Left  -KEYSHA    Pain Intervention(s)  Ambulation/increased activity;Repositioned;Rest  -KEYSHA Ambulation/increased activity;Repositioned;Rest  -KEYSHA    Response to Interventions  tolerated  -KEYSHA tolerated  -KEYSHA    Recorded by [SUZANNE] Mgean Howard, PT [KEYSHA] Nic Parikh, XAVIER [KEYSHA] Nic Parikh, XAVIER    Cognitive Assessment/Intervention    Current Cognitive/Communication Assessment functional  -SUZANNE impaired  -KEYSHA impaired  -KEYSHA    Orientation Status oriented x 4  -SUZANNE oriented to;person;situation;place  -KEYSHA oriented to;person;situation  -KEYSHA    Follows Commands/Answers Questions  100% of the time;able  to follow single-step instructions  -KEYSHA able to follow single-step instructions;100% of the time  -KEYSHA    Personal Safety mild impairment  -SUZANNE mild impairment  -KEYSHA mild impairment  -KEYSHA    Personal Safety Interventions fall prevention program maintained;gait belt;muscle strengthening facilitated;nonskid shoes/slippers when out of bed  -SUZANNE fall prevention program maintained;gait belt;nonskid shoes/slippers when out of bed  -KEYSHA fall prevention program maintained;gait belt;nonskid shoes/slippers when out of bed  -KEYSHA    Recorded by [SUZANNE] Megan Howard, PT [KEYSHA] Nic Parikh PTA [KEYSHA] Nic Parikh PTA    ROM (Range of Motion)    General ROM   lower extremity range of motion deficits identified  -KEYSHA    General ROM Detail 23-84  -SUZANNE      Recorded by [SUZANNE] Megan Howard, PT  [KEYSHA] Nic Parikh PTA    General LE Assessment    ROM   knee, left: LE ROM deficit  -KEYSHA    ROM Detail   24-85'  -KEYSHA    Recorded by   [KEYSHA] Nic Parikh PTA    Bed Mobility, Assessment/Treatment    Bed Mob, Supine to Sit, [place] minimum assist (75% patient effort);contact guard assist  -SUZANNE not tested  -KEYSHA not tested  -KEYSHA    Bed Mob, Sit to Supine, [place] minimum assist (75% patient effort);contact guard assist  -SUZANNE not tested  -KEYSHA not tested  -KEYSHA    Recorded by [SUZANNE] Megan Howard, PT [KEYSHA] Nic Parikh PTA [KEYSHA] Nic Parikh PTA    Transfer Assessment/Treatment    Transfers, Sit-Stand [place] contact guard assist;verbal cues required  -SUZANNE contact guard assist;verbal cues required  -KEYSHA contact guard assist;verbal cues required  -KEYSHA    Transfers, Stand-Sit [place] contact guard assist;verbal cues required  -SUZANNE contact guard assist;verbal cues required  -KEYSHA contact guard assist;verbal cues required  -KEYSHA    Transfers, Sit-Stand-Sit, Assist Device rolling walker  -SUZANNE rolling walker  -KEYSHA rolling walker  -KEYSHA    Transfer, Impairments  strength decreased;impaired balance;pain;ROM decreased  -KEYSHA strength decreased;impaired balance;pain;ROM  decreased  -KEYSHA    Recorded by [SUZANNE] Megan Howard, PT [KEYSHA] Nic Parikh PTA [KEYSHA] Nic Parikh PTA    Gait Assessment/Treatment    Gait, [place] Level minimum assist (75% patient effort);verbal cues required;contact guard assist  -SUZANNE minimum assist (75% patient effort);verbal cues required;nonverbal cues required (demo/gesture)  -KEYSHA minimum assist (75% patient effort);verbal cues required;nonverbal cues required (demo/gesture)  -KEYSHA    Gait, Assistive Device rolling walker  -SUZANNE rolling walker  -KEYSHA rolling walker  -KEYSHA    Gait, Distance (Feet) 50  -SUZANNE 56  -KEYSHA 50  -KEYSHA    Gait, Gait Deviations antalgic;marcos decreased;forward flexed posture  -SUZANNE forward flexed posture;marcos decreased;left:;antalgic;decreased heel strike;step length decreased;stride length decreased;swing-to-stance ratio decreased;toe-to-floor clearance decreased;weight-shifting ability decreased  -KEYSHA forward flexed posture;marcos decreased;left:;antalgic;decreased heel strike;step length decreased;stride length decreased;swing-to-stance ratio decreased;toe-to-floor clearance decreased;weight-shifting ability decreased  -KEYSHA    Gait, Safety Issues  step length decreased  -KEYSHA step length decreased  -KEYSHA    Gait, Impairments  strength decreased;impaired balance;ROM decreased;pain  -KYESHA strength decreased;impaired balance;ROM decreased;pain  -KEYSHA    Recorded by [SUZANNE] Megan Howard, PT [KEYSHA] Nic Parikh PTA [KEYSHA] Nic Parikh PTA    Therapy Exercises    Exercise Protocols total knee  -SUZANNE total knee  -KEYSHA total knee  -KEYSHA    Total Knee Exercises left:;completed protocol;30 reps  -SUZANNE left:;completed protocol;25 reps  -KEYSHA left:;20 reps;completed protocol  -KEYSHA    Recorded by [SUZANNE] Megan Howard, PT [KEYSHA] Nic Parikh PTA [KEYSHA] Nic Parikh PTA    Positioning and Restraints    Pre-Treatment Position sitting in chair/recliner  -SUZANNE sitting in chair/recliner  -KEYSHA sitting in chair/recliner  -KEYSHA    Post Treatment Position chair  -SUZANNE chair  -KEYSHA chair  -KEYSHA    In  Chair reclined;call light within reach  -SUZANNE reclined;call light within reach;encouraged to call for assist  -KEYSHA reclined;call light within reach;encouraged to call for assist  -KEYSHA    Recorded by [SUZANNE] Megan Howard, PT [KEYSHA] Nic Parikh PTA [KEYSHA] Nic Parikh PTA      02/16/17 1310          Rehab Assessment/Intervention    Discipline physical therapy assistant  -KEYSHA      Document Type therapy note (daily note)  -KEYSHA      Subjective Information agree to therapy  -KEYSHA      Patient Effort, Rehab Treatment good  -KEYSHA      Precautions/Limitations fall precautions  -KEYSHA      Recorded by [KEYSHA] Nic Parikh PTA      Pain Assessment    Pain Assessment 0-10  -KEYSHA      Pain Score 5  -KEYSHA      Pain Type Acute pain;Surgical pain  -KEYSHA      Pain Location Knee  -KEYSHA      Pain Orientation Left  -KEYSHA      Pain Intervention(s) Ambulation/increased activity;Repositioned;Rest  -KEYSHA      Response to Interventions tolerated  -KEYSHA      Recorded by [KEYSHA] Nic Parikh PTA      Cognitive Assessment/Intervention    Current Cognitive/Communication Assessment impaired  -KEYSHA      Orientation Status oriented to;person;situation  -KEYSHA      Follows Commands/Answers Questions 75% of the time;able to follow single-step instructions  -KEYSHA      Personal Safety mild impairment  -KEYSHA      Personal Safety Interventions fall prevention program maintained;gait belt;nonskid shoes/slippers when out of bed  -KEYSHA      Recorded by [KEYSHA] Nic Parikh PTA      ROM (Range of Motion)    General ROM lower extremity range of motion deficits identified  -KEYSHA      Recorded by [KEYSHA] Nic Parikh PTA      General LE Assessment    ROM knee, left: LE ROM deficit  -KEYSHA      ROM Detail 26-82'  -KEYSHA      Recorded by [KEYSHA] Nic Parikh PTA      Bed Mobility, Assessment/Treatment    Bed Mob, Supine to Sit, [place] not tested  -KEYSHA      Bed Mob, Sit to Supine, [place] not tested  -KEYSHA      Recorded by [KEYSHA] Nic Parikh PTA      Transfer Assessment/Treatment    Transfers, Sit-Stand [place]  contact guard assist;verbal cues required  -KEYSHA      Transfers, Stand-Sit [place] contact guard assist;verbal cues required  -KEYSHA      Transfers, Sit-Stand-Sit, Assist Device rolling walker  -KEYSHA      Transfer, Impairments strength decreased;impaired balance;pain;ROM decreased  -KEYSHA      Recorded by [KEYSHA] Nic Parikh PTA      Gait Assessment/Treatment    Gait, [place] Level minimum assist (75% patient effort);verbal cues required;nonverbal cues required (demo/gesture)  -KEYSHA      Gait, Assistive Device rolling walker  -KEYSHA      Gait, Distance (Feet) 45  -KEYSHA      Gait, Gait Deviations forward flexed posture;marcos decreased;left:;antalgic;decreased heel strike;step length decreased;stride length decreased;swing-to-stance ratio decreased;toe-to-floor clearance decreased;weight-shifting ability decreased  -KEYSHA      Gait, Safety Issues step length decreased  -KEYSHA      Gait, Impairments strength decreased;impaired balance;ROM decreased;pain  -KEYSHA      Recorded by [KEYSHA] Nic Parikh PTA      Therapy Exercises    Exercise Protocols total knee  -KEYSHA      Total Knee Exercises left:;15 reps;completed protocol  -KEYSHA      Recorded by [KEYSHA] Nic Parikh PTA      Positioning and Restraints    Pre-Treatment Position sitting in chair/recliner  -KEYSHA      Post Treatment Position chair  -KEYSHA      In Chair reclined;call light within reach;encouraged to call for assist  -KEYSHA      Recorded by [KEYSHA] Nic Parikh PTA        User Key  (r) = Recorded By, (t) = Taken By, (c) = Cosigned By    Initials Name Effective Dates    SUZANNE Howard, PT 10/06/15 -     KEYSHA Parikh, PTA 04/24/15 -                 IP PT Goals       02/16/17 1003          Transfer Training PT LTG    Transfer Training PT LTG, Date Established 02/16/17  -EE      Transfer Training PT LTG, Time to Achieve 3 days  -EE      Transfer Training PT LTG, Activity Type all transfers  -EE      Transfer Training PT LTG, [place] Level supervision required  -EE      Transfer Training  PT LTG, Assist Device walker, standard  -EE      Gait Training PT LTG    Gait Training Goal PT LTG, Date Established 02/16/17  -EE      Gait Training Goal PT LTG, Time to Achieve 3 days  -EE      Gait Training Goal PT LTG, [place] Level contact guard assist  -EE      Gait Training Goal PT LTG, Assist Device walker, standard  -EE      Gait Training Goal PT LTG, Distance to Achieve 75  -EE      Range of Motion PT LTG    Range of Motion Goal PT LTG, Date Established 02/16/17  -EE      Range of Motion Goal PT LTG, Time to Achieve 3 days  -EE      Range fo Motion Goal PT LTG, Joint L knee  -EE      Range of Motion Goal PT LTG, AROM Measure 0-90  -EE      Patient Education PT LTG    Patient Education PT LTG, Date Established 02/16/17  -EE      Patient Education PT LTG, Time to Achieve 3 days  -EE      Patient Education PT LTG, Education Type HEP  -EE      Patient Education PT LTG, Education Understanding demonstrate adequately;verbalize understanding  -EE        User Key  (r) = Recorded By, (t) = Taken By, (c) = Cosigned By    Initials Name Provider Type    EE Zayra Niño, PT Physical Therapist          Physical Therapy Education     Title: PT OT SLP Therapies (Done)     Topic: Physical Therapy (Done)     Point: Mobility training (Done)    Learning Progress Summary    Learner Readiness Method Response Comment Documented by Status   Patient Acceptance E VU,NR  SUZANNE 02/18/17 1441 Done    Acceptance E VU,NR  KEYSHA 02/17/17 1621 Done    Acceptance E VU  KEYSHA 02/17/17 1155 Done    Acceptance E VU  KEYSHA 02/16/17 1541 Done    Acceptance ECLNR  EE 02/16/17 1003 Done               Point: Home exercise program (Done)    Learning Progress Summary    Learner Readiness Method Response Comment Documented by Status   Patient Acceptance E VU,NR  SUZANNE 02/18/17 1441 Done    Acceptance E VU,NR  KEYSHA 02/17/17 1621 Done    Acceptance E VU  KEYSHA 02/17/17 1155 Done    Acceptance E VU  KEYSHA 02/16/17 1541 Done    Acceptance ECLNR  EE 02/16/17 1003  Done               Point: Body mechanics (Done)    Learning Progress Summary    Learner Readiness Method Response Comment Documented by Status   Patient Acceptance E VU,NR  KEYSHA 02/17/17 1621 Done    Acceptance E VU  KEYSHA 02/17/17 1155 Done    Acceptance E VU  KEYSHA 02/16/17 1541 Done    Acceptance E,TB VU,NR   02/16/17 1003 Done                      User Key     Initials Effective Dates Name Provider Type Discipline    SUZANNE 10/06/15 -  Megan Howard, PT Physical Therapist PT    EE 12/01/15 -  Zayra Niño, PT Physical Therapist PT    KEYSHA 04/24/15 -  Nic Parikh PTA Physical Therapy Assistant PT                    PT Recommendation and Plan  Anticipated Discharge Disposition: skilled nursing facility  Planned Therapy Interventions: balance training, bed mobility training, gait training, home exercise program, patient/family education, ROM (Range of Motion), strengthening, stretching, transfer training  PT Frequency: 2 times/day  Plan of Care Review  Plan Of Care Reviewed With: patient  Progress: progress toward functional goals as expected          Outcome Measures       02/18/17 1400 02/17/17 1100 02/16/17 1500    How much help from another person do you currently need...    Turning from your back to your side while in flat bed without using bedrails? 3  -SUZANNE 3  -KEYSHA 3  -KEYSHA    Moving from lying on back to sitting on the side of a flat bed without bedrails? 3  -SUZANNE 3  -KEYSHA 3  -KEYSHA    Moving to and from a bed to a chair (including a wheelchair)? 3  -SUZANNE 3  -KEYSHA 3  -KEYSHA    Standing up from a chair using your arms (e.g., wheelchair, bedside chair)? 3  -SUZANNE 3  -KEYSHA 3  -KEYSHA    Climbing 3-5 steps with a railing? 3  -SUZANNE 2  -KEYSHA 2  -KEYSHA    To walk in hospital room? 3  -SUZANNE 3  -KEYSHA 3  -KEYSHA    AM-PAC 6 Clicks Score 18  -SUZANNE 17  -KEYSHA 17  -KEYSHA    Functional Assessment    Outcome Measure Options AM-PAC 6 Clicks Basic Mobility (PT)  -SUZANNE AM-PAC 6 Clicks Basic Mobility (PT)  -KEYSHA AM-PAC 6 Clicks Basic Mobility (PT)  -KEYSHA      02/16/17 1000          How much  help from another person do you currently need...    Turning from your back to your side while in flat bed without using bedrails? 3  -EE      Moving from lying on back to sitting on the side of a flat bed without bedrails? 3  -EE      Moving to and from a bed to a chair (including a wheelchair)? 3  -EE      Standing up from a chair using your arms (e.g., wheelchair, bedside chair)? 3  -EE      Climbing 3-5 steps with a railing? 2  -EE      To walk in hospital room? 3  -EE      AM-PAC 6 Clicks Score 17  -EE      Functional Assessment    Outcome Measure Options AM-PAC 6 Clicks Basic Mobility (PT)  -EE        User Key  (r) = Recorded By, (t) = Taken By, (c) = Cosigned By    Initials Name Provider Type    SUZANNE Howard, PT Physical Therapist    SALVADOR Niño, PT Physical Therapist    KEYSHA Parikh, PTA Physical Therapy Assistant           Time Calculation:         PT Charges       02/18/17 1443          Time Calculation    Start Time 0930  -SUZANNE      Stop Time 1030  -SUZANNE      Time Calculation (min) 60 min  -SUZANNE      PT Received On 02/18/17  -SUZANNE        User Key  (r) = Recorded By, (t) = Taken By, (c) = Cosigned By    Initials Name Provider Type    SUZANNE Howard, PT Physical Therapist          Therapy Charges for Today     Code Description Service Date Service Provider Modifiers Qty    60343922008 HC PT THER PROC GROUP 2/18/2017 Megan Howard, PT GP 1    [id number] HC PT THER PROC EA 15 MIN 2/18/2017 Megan Howard, PT GP 1          PT G-Codes  Outcome Measure Options: AM-PAC 6 Clicks Basic Mobility (PT)    Megan Howard PT  2/18/2017

## 2017-02-18 NOTE — PLAN OF CARE
Problem: Patient Care Overview (Adult)  Goal: Plan of Care Review  Outcome: Ongoing (interventions implemented as appropriate)    02/18/17 0422   Coping/Psychosocial Response Interventions   Plan Of Care Reviewed With patient   Patient Care Overview   Progress improving   Outcome Evaluation   Outcome Summary/Follow up Plan ambulates fair, once out of bed, taking only tylenol for pain, less confusion       Goal: Adult Individualization and Mutuality  Outcome: Ongoing (interventions implemented as appropriate)  Goal: Discharge Needs Assessment  Outcome: Ongoing (interventions implemented as appropriate)    Problem: Knee Replacement, Total (Adult)  Goal: Signs and Symptoms of Listed Potential Problems Will be Absent or Manageable (Knee Replacement, Total)  Outcome: Ongoing (interventions implemented as appropriate)    Problem: Fall Risk (Adult)  Goal: Absence of Falls  Outcome: Ongoing (interventions implemented as appropriate)

## 2017-02-18 NOTE — PROGRESS NOTES
Acute Care - Physical Therapy Treatment Note  [facility]     Patient Name: Gregoria Jimenez  : 1934  MRN: [id number]  Today's Date: 2017  Onset of Illness/Injury or Date of Surgery Date: 02/15/17     Referring Physician: Alena    Admit Date: 2/15/2017    Visit Dx:    ICD-10-CM ICD-9-CM   1. Difficulty walking R26.2 719.7     Patient Active Problem List   Diagnosis   • Osteoarthritis of left knee               Adult Rehabilitation Note       17 1500 17 0930 17 1300    Rehab Assessment/Intervention    Discipline physical therapist  -SUZANNE physical therapist  -SUZANNE physical therapy assistant  -KEYSHA    Document Type therapy note (daily note)  -SUZANNE therapy note (daily note)  -SUZANNE therapy note (daily note)  -KEYSHA    Subjective Information agree to therapy  -SUZANNE agree to therapy  -SUZANNE agree to therapy  -KEYSHA    Patient Effort, Rehab Treatment good  -SUZANNE good  -SUZANNE adequate  -KEYSHA    Precautions/Limitations   fall precautions  -KYESHA    Recorded by [SUZANNE] Megan Howard, PT [SUZANNE] Megan Howard, PT [KEYSHA] Nic Parikh PTA    Pain Assessment    Pain Assessment 0-10  -SUZANNE 0-10  -SUZANNE 0-10  -KEYSHA    Pain Score 3  -SUZANNE 3  -SUZANNE 0  -KEYSHA    Post Pain Score   3  -KEYSHA    Pain Type   Acute pain;Surgical pain  -KEYSHA    Pain Location Knee  -SUZANEN Knee  -SUZANNE Knee  -KEYSHA    Pain Orientation Left  -SUZANNE Left  -SUZANNE Left  -KEYSHA    Pain Intervention(s)   Ambulation/increased activity;Repositioned;Rest  -KEYSHA    Response to Interventions   tolerated  -KEYSHA    Recorded by [SUZANNE] Megan Howard, PT [SUZANNE] Megan Howard, PT [KEYSHA] Nic Parikh PTA    Cognitive Assessment/Intervention    Current Cognitive/Communication Assessment functional  -SUZANNE functional  -SUZANNE impaired  -KEYSHA    Orientation Status oriented x 4  -SUZANNE oriented x 4  -SUZANNE oriented to;person;situation;place  -KEYSHA    Follows Commands/Answers Questions   100% of the time;able to follow single-step instructions  -KEYSHA    Personal Safety mild impairment  -SUZANNE mild impairment  -SUZANNE mild impairment  -KEYSHA    Personal  Safety Interventions fall prevention program maintained;gait belt;muscle strengthening facilitated;nonskid shoes/slippers when out of bed  -SUZANNE fall prevention program maintained;gait belt;muscle strengthening facilitated;nonskid shoes/slippers when out of bed  -SUZANNE fall prevention program maintained;gait belt;nonskid shoes/slippers when out of bed  -KEYSHA    Recorded by [SUZANNE] Megan Howard, PT [SUZANNE] Megan Howard, PT [KEYSHA] Nic Parikh, PTA    ROM (Range of Motion)    General ROM Detail  23-84  -SUZANNE     Recorded by  [SUZANNE] Megan Howard PT     Bed Mobility, Assessment/Treatment    Bed Mob, Supine to Sit, [place] minimum assist (75% patient effort);contact guard assist  -SUZANNE minimum assist (75% patient effort);contact guard assist  -SUZANNE not tested  -KEYSHA    Bed Mob, Sit to Supine, [place] minimum assist (75% patient effort);contact guard assist  -SUZANNE minimum assist (75% patient effort);contact guard assist  -SUZANNE not tested  -KEYSHA    Recorded by [SUZANNE] Megan Howard PT [SUZANNE] Megan Howard, PT [KEYSHA] Nic Parikh, PTA    Transfer Assessment/Treatment    Transfers, Sit-Stand [place] contact guard assist;verbal cues required  -SUZANNE contact guard assist;verbal cues required  -SUZANNE contact guard assist;verbal cues required  -KEYSHA    Transfers, Stand-Sit [place] contact guard assist;verbal cues required  -SUZANNE contact guard assist;verbal cues required  -SUZANNE contact guard assist;verbal cues required  -KEYSHA    Transfers, Sit-Stand-Sit, Assist Device rolling walker  -SUZANNE rolling walker  -SUZANNE rolling walker  -KEYSHA    Transfer, Impairments   strength decreased;impaired balance;pain;ROM decreased  -KEYSHA    Recorded by [SUZANNE] Megan Howard, PT [SUZANNE] Megan Howard, PT [KEYSHA] Nic Parikh, XAVIER    Gait Assessment/Treatment    Gait, [place] Level minimum assist (75% patient effort);verbal cues required;contact guard assist  -SUZANNE minimum assist (75% patient effort);verbal cues required;contact guard assist  -SUZANNE minimum assist (75%  patient effort);verbal cues required;nonverbal cues required (demo/gesture)  -KEYSHA    Gait, Assistive Device rolling walker  -SUZANNE rolling walker  -SUZANNE rolling walker  -KEYSHA    Gait, Distance (Feet) 100  -SUZANNE 50  -SUZANNE 56  -KEYSHA    Gait, Gait Deviations antalgic;marcos decreased;forward flexed posture  -SUZANNE antalgic;marcos decreased;forward flexed posture  -SUZANNE forward flexed posture;marcos decreased;left:;antalgic;decreased heel strike;step length decreased;stride length decreased;swing-to-stance ratio decreased;toe-to-floor clearance decreased;weight-shifting ability decreased  -KEYSHA    Gait, Safety Issues   step length decreased  -KEYSHA    Gait, Impairments   strength decreased;impaired balance;ROM decreased;pain  -KEYSHA    Recorded by [SUZANNE] Megan Howard PT [SUZANNE] Megan Howard PT [KEYSHA] Nic Parikh PTA    Therapy Exercises    Exercise Protocols total knee  -SUZANNE total knee  -SUZANNE total knee  -KEYSHA    Total Knee Exercises left:;completed protocol;30 reps  -SUZANNE left:;completed protocol;30 reps  -SUZANNE left:;completed protocol;25 reps  -KEYSHA    Recorded by [SUZANNE] Megan Howard PT [SUZANNE] Megan Howard, PT [KEYSHA] Nic Parikh, XAVIER    Positioning and Restraints    Pre-Treatment Position sitting in chair/recliner  -SUZANNE sitting in chair/recliner  -SUZANNE sitting in chair/recliner  -KEYSHA    Post Treatment Position chair  -SUZANNE chair  -SUZANNE chair  -KEYSHA    In Chair reclined;call light within reach  -SUZANNE reclined;call light within reach  -SUZANNE reclined;call light within reach;encouraged to call for assist  -KEYSHA    Recorded by [SUZANNE] Megan Howard PT [SUZANNE] Megan Howard, PT [KEYSHA] Nic Parikh, XAVIER      02/17/17 0850 02/16/17 1310       Rehab Assessment/Intervention    Discipline physical therapy assistant  -KEYSHA physical therapy assistant  -KEYSHA     Document Type therapy note (daily note)  -KEYSHA therapy note (daily note)  -KEYSHA     Subjective Information agree to therapy  -KEYSHA agree to therapy  -KEYSHA     Patient Effort, Rehab Treatment adequate  -KEYSHA good  -KEYSHA      Precautions/Limitations fall precautions  -KEYSHA fall precautions  -KEYSHA     Recorded by [KEYSHA] Nic Parikh PTA [KEYSHA] Nic Parikh PTA     Pain Assessment    Pain Assessment 0-10  -KEYSHA 0-10  -KEYSHA     Pain Score 8  -KEYSHA 5  -KEYSHA     Pain Type Acute pain;Surgical pain  -KEYSHA Acute pain;Surgical pain  -KEYSHA     Pain Location Knee  -KEYSHA Knee  -KEYSHA     Pain Orientation Left  -KEYSHA Left  -KEYSHA     Pain Intervention(s) Ambulation/increased activity;Repositioned;Rest  -KEYSHA Ambulation/increased activity;Repositioned;Rest  -KEYSHA     Response to Interventions tolerated  -KEYSHA tolerated  -KEYSHA     Recorded by [KEYSHA] Nic Parikh PTA [KEYSHA] Nic Parikh PTA     Cognitive Assessment/Intervention    Current Cognitive/Communication Assessment impaired  -KEYSHA impaired  -KEYSHA     Orientation Status oriented to;person;situation  -KEYSHA oriented to;person;situation  -KEYSHA     Follows Commands/Answers Questions able to follow single-step instructions;100% of the time  -KEYSHA 75% of the time;able to follow single-step instructions  -KEYSHA     Personal Safety mild impairment  -KEYSHA mild impairment  -KEYSHA     Personal Safety Interventions fall prevention program maintained;gait belt;nonskid shoes/slippers when out of bed  -KEYSHA fall prevention program maintained;gait belt;nonskid shoes/slippers when out of bed  -KEYSHA     Recorded by [KEYSHA] Nic Parikh PTA [KEYSHA] Nic Parikh PTA     ROM (Range of Motion)    General ROM lower extremity range of motion deficits identified  -KEYSHA lower extremity range of motion deficits identified  -KEYSHA     Recorded by [KEYSHA] Nic Parikh PTA [KEYSHA] Nic Parikh PTA     General LE Assessment    ROM knee, left: LE ROM deficit  -KEYSHA knee, left: LE ROM deficit  -KEYSHA     ROM Detail 24-85'  -KEYSHA 26-82'  -KEYSHA     Recorded by [KEYSHA] Nic Parikh PTA [KEYSHA] Nic Parikh PTA     Bed Mobility, Assessment/Treatment    Bed Mob, Supine to Sit, [place] not tested  -KEYSHA not tested  -KEYSHA     Bed Mob, Sit to Supine, [place] not tested  -KEYSHA not tested  -KEYSHA     Recorded by [KEYSHA] Nic Parikh  PTA [KEYSHA] Nic Parikh PTA     Transfer Assessment/Treatment    Transfers, Sit-Stand [place] contact guard assist;verbal cues required  -KEYSHA contact guard assist;verbal cues required  -KEYSHA     Transfers, Stand-Sit [place] contact guard assist;verbal cues required  -KEYSHA contact guard assist;verbal cues required  -KEYSHA     Transfers, Sit-Stand-Sit, Assist Device rolling walker  -KEYSHA rolling walker  -KEYSHA     Transfer, Impairments strength decreased;impaired balance;pain;ROM decreased  -KEYSHA strength decreased;impaired balance;pain;ROM decreased  -KEYSHA     Recorded by [KEYSHA] Nic Parikh PTA [KEYSHA] Nic Parikh PTA     Gait Assessment/Treatment    Gait, [place] Level minimum assist (75% patient effort);verbal cues required;nonverbal cues required (demo/gesture)  -KEYSHA minimum assist (75% patient effort);verbal cues required;nonverbal cues required (demo/gesture)  -KEYSHA     Gait, Assistive Device rolling walker  -KEYSHA rolling walker  -KEYSHA     Gait, Distance (Feet) 50  -KEYSHA 45  -KEYSHA     Gait, Gait Deviations forward flexed posture;marcos decreased;left:;antalgic;decreased heel strike;step length decreased;stride length decreased;swing-to-stance ratio decreased;toe-to-floor clearance decreased;weight-shifting ability decreased  -KEYSHA forward flexed posture;marcos decreased;left:;antalgic;decreased heel strike;step length decreased;stride length decreased;swing-to-stance ratio decreased;toe-to-floor clearance decreased;weight-shifting ability decreased  -KEYSHA     Gait, Safety Issues step length decreased  -KEYSHA step length decreased  -KEYSHA     Gait, Impairments strength decreased;impaired balance;ROM decreased;pain  -KEYSHA strength decreased;impaired balance;ROM decreased;pain  -KEYSHA     Recorded by [KEYSHA] Nic Parikh PTA [KEYSHA] Nic Parikh PTA     Therapy Exercises    Exercise Protocols total knee  -KEYSHA total knee  -KEYSHA     Total Knee Exercises left:;20 reps;completed protocol  -KEYSHA left:;15 reps;completed protocol  -KEYSHA     Recorded by [KEYSHA] Nic  Jl, XAVIER [KEYSHA] Nic Parikh PTA     Positioning and Restraints    Pre-Treatment Position sitting in chair/recliner  -KEYSHA sitting in chair/recliner  -KEYSHA     Post Treatment Position chair  -KEYSHA chair  -KEYSHA     In Chair reclined;call light within reach;encouraged to call for assist  -KEYSHA reclined;call light within reach;encouraged to call for assist  -KEYSHA     Recorded by [KEYSHA] Nic Parikh PTA [KEYSHA] Nic Parikh PTA       User Key  (r) = Recorded By, (t) = Taken By, (c) = Cosigned By    Initials Name Effective Dates    SUZANNE Megan Howard, PT 10/06/15 -     KEYSHA Nic Parikh, PTA 04/24/15 -                 IP PT Goals       02/16/17 1003          Transfer Training PT LTG    Transfer Training PT LTG, Date Established 02/16/17  -EE      Transfer Training PT LTG, Time to Achieve 3 days  -EE      Transfer Training PT LTG, Activity Type all transfers  -EE      Transfer Training PT LTG, [place] Level supervision required  -EE      Transfer Training PT LTG, Assist Device walker, standard  -EE      Gait Training PT LTG    Gait Training Goal PT LTG, Date Established 02/16/17  -EE      Gait Training Goal PT LTG, Time to Achieve 3 days  -EE      Gait Training Goal PT LTG, [place] Level contact guard assist  -EE      Gait Training Goal PT LTG, Assist Device walker, standard  -EE      Gait Training Goal PT LTG, Distance to Achieve 75  -EE      Range of Motion PT LTG    Range of Motion Goal PT LTG, Date Established 02/16/17  -EE      Range of Motion Goal PT LTG, Time to Achieve 3 days  -EE      Range fo Motion Goal PT LTG, Joint L knee  -EE      Range of Motion Goal PT LTG, AROM Measure 0-90  -EE      Patient Education PT LTG    Patient Education PT LTG, Date Established 02/16/17  -EE      Patient Education PT LTG, Time to Achieve 3 days  -EE      Patient Education PT LTG, Education Type HEP  -EE      Patient Education PT LTG, Education Understanding demonstrate adequately;verbalize understanding  -EE        User Key  (r) = Recorded  By, (t) = Taken By, (c) = Cosigned By    Initials Name Provider Type    EE Zayra Niño, PT Physical Therapist          Physical Therapy Education     Title: PT OT SLP Therapies (Done)     Topic: Physical Therapy (Done)     Point: Mobility training (Done)    Learning Progress Summary    Learner Readiness Method Response Comment Documented by Status   Patient Acceptance E VU,NR  SUZANNE 02/18/17 1441 Done    Acceptance E VU,NR  KEYSHA 02/17/17 1621 Done    Acceptance E VU  KEYSHA 02/17/17 1155 Done    Acceptance E VU  KEYSHA 02/16/17 1541 Done    Acceptance E,TB VU,NR  EE 02/16/17 1003 Done               Point: Home exercise program (Done)    Learning Progress Summary    Learner Readiness Method Response Comment Documented by Status   Patient Acceptance E VU,NR  SUZANNE 02/18/17 1441 Done    Acceptance E VU,NR  KEYSHA 02/17/17 1621 Done    Acceptance E VU  KEYSHA 02/17/17 1155 Done    Acceptance E VU  KEYSHA 02/16/17 1541 Done    Acceptance E,TB VU,NR  EE 02/16/17 1003 Done               Point: Body mechanics (Done)    Learning Progress Summary    Learner Readiness Method Response Comment Documented by Status   Patient Acceptance E VU,NR  KEYSHA 02/17/17 1621 Done    Acceptance E VU  KEYSHA 02/17/17 1155 Done    Acceptance E VU  KEYSHA 02/16/17 1541 Done    Acceptance E,TB VU,NR  EE 02/16/17 1003 Done                      User Key     Initials Effective Dates Name Provider Type Discipline     10/06/15 -  Megan Howard, PT Physical Therapist PT     12/01/15 -  Zayra Niño, PT Physical Therapist PT    KEYSHA 04/24/15 -  Nic Parikh, PTA Physical Therapy Assistant PT                    PT Recommendation and Plan  Anticipated Discharge Disposition: skilled nursing facility  Planned Therapy Interventions: balance training, bed mobility training, gait training, home exercise program, patient/family education, ROM (Range of Motion), strengthening, stretching, transfer training  PT Frequency: 2 times/day  Plan of Care Review  Plan Of Care Reviewed With:  patient  Progress: progress toward functional goals as expected          Outcome Measures       02/18/17 1400 02/17/17 1100 02/16/17 1500    How much help from another person do you currently need...    Turning from your back to your side while in flat bed without using bedrails? 3  -SUZANNE 3  -KEYSHA 3  -KEYSHA    Moving from lying on back to sitting on the side of a flat bed without bedrails? 3  -SUZANNE 3  -KEYSHA 3  -KEYSHA    Moving to and from a bed to a chair (including a wheelchair)? 3  -SUZANNE 3  -KEYSHA 3  -KEYSHA    Standing up from a chair using your arms (e.g., wheelchair, bedside chair)? 3  -SUZANNE 3  -KEYSHA 3  -KEYSHA    Climbing 3-5 steps with a railing? 3  -SUZANNE 2  -KEYSHA 2  -KEYSHA    To walk in hospital room? 3  -SUZANNE 3  -KEYSHA 3  -KEYSHA    AM-PAC 6 Clicks Score 18  -SUZANNE 17  -KEYSHA 17  -KEYSHA    Functional Assessment    Outcome Measure Options AM-PAC 6 Clicks Basic Mobility (PT)  -SUZANNE AM-PAC 6 Clicks Basic Mobility (PT)  -KEYSHA AM-PAC 6 Clicks Basic Mobility (PT)  -KEYSHA      02/16/17 1000          How much help from another person do you currently need...    Turning from your back to your side while in flat bed without using bedrails? 3  -EE      Moving from lying on back to sitting on the side of a flat bed without bedrails? 3  -EE      Moving to and from a bed to a chair (including a wheelchair)? 3  -EE      Standing up from a chair using your arms (e.g., wheelchair, bedside chair)? 3  -EE      Climbing 3-5 steps with a railing? 2  -EE      To walk in hospital room? 3  -EE      AM-PAC 6 Clicks Score 17  -EE      Functional Assessment    Outcome Measure Options AM-PAC 6 Clicks Basic Mobility (PT)  -EE        User Key  (r) = Recorded By, (t) = Taken By, (c) = Cosigned By    Initials Name Provider Type    SUZANNE Howard, PT Physical Therapist    SALVADOR Niño, PT Physical Therapist    KEYSHA Parikh, PTA Physical Therapy Assistant           Time Calculation:         PT Charges       02/18/17 1809 02/18/17 1443       Time Calculation    Start Time 1500  -SUZANNE 0930  -SUZANNE      Stop Time 1600  -SUZANNE 1030  -SUZANNE     Time Calculation (min) 60 min  -SUZANNE 60 min  -SUZANNE     PT Received On 02/18/17  -SUZANNE 02/18/17  -SUZANNE       User Key  (r) = Recorded By, (t) = Taken By, (c) = Cosigned By    Initials Name Provider Type    SUZANNE Howard, PT Physical Therapist          Therapy Charges for Today     Code Description Service Date Service Provider Modifiers Qty    82546225820 HC PT THER PROC GROUP 2/18/2017 Megan Howard, PT GP 1    12958029030 HC PT THER PROC EA 15 MIN 2/18/2017 Megan Howard, PT GP 1    27234120090 HC PT THER PROC GROUP 2/18/2017 Megan Howard, PT GP 1    49712910406 HC PT THER PROC EA 15 MIN 2/18/2017 Megan Howard, PT GP 1          PT G-Codes  Outcome Measure Options: AM-PAC 6 Clicks Basic Mobility (PT)    Megan Howard PT  2/18/2017

## 2017-02-18 NOTE — PLAN OF CARE
Problem: Patient Care Overview (Adult)  Goal: Plan of Care Review  Outcome: Ongoing (interventions implemented as appropriate)    02/18/17 1812   Coping/Psychosocial Response Interventions   Plan Of Care Reviewed With patient   Patient Care Overview   Progress improving   Outcome Evaluation   Outcome Summary/Follow up Plan patient taking tylenol for pain and it is controlled well. ambulated well once moving with assist of 1, sometimes 2. patient placed on potassium protocol today because potassium was 2.9. given x3 doses and lab will be drawn tonight at 2145. patient will be going to [facility] at discharge, probably monday , when a bed is available.          Problem: Knee Replacement, Total (Adult)  Goal: Signs and Symptoms of Listed Potential Problems Will be Absent or Manageable (Knee Replacement, Total)  Outcome: Ongoing (interventions implemented as appropriate)    Problem: Fall Risk (Adult)  Goal: Absence of Falls  Outcome: Ongoing (interventions implemented as appropriate)

## 2017-02-18 NOTE — PLAN OF CARE
Problem: Patient Care Overview (Adult)  Goal: Plan of Care Review  Outcome: Ongoing (interventions implemented as appropriate)    02/18/17 1442   Coping/Psychosocial Response Interventions   Plan Of Care Reviewed With patient   Patient Care Overview   Progress progress toward functional goals as expected

## 2017-02-19 LAB
ANION GAP SERPL CALCULATED.3IONS-SCNC: 12.4 MMOL/L
BASOPHILS # BLD AUTO: 0.01 10*3/MM3 (ref 0–0.2)
BASOPHILS NFR BLD AUTO: 0.1 % (ref 0–1.5)
BUN BLD-MCNC: 21 MG/DL (ref 8–23)
BUN/CREAT SERPL: 29.2 (ref 7–25)
CALCIUM SPEC-SCNC: 8.4 MG/DL (ref 8.6–10.5)
CHLORIDE SERPL-SCNC: 97 MMOL/L (ref 98–107)
CO2 SERPL-SCNC: 26.6 MMOL/L (ref 22–29)
CREAT BLD-MCNC: 0.72 MG/DL (ref 0.57–1)
DEPRECATED RDW RBC AUTO: 42.8 FL (ref 37–54)
EOSINOPHIL # BLD AUTO: 0.15 10*3/MM3 (ref 0–0.7)
EOSINOPHIL NFR BLD AUTO: 1.8 % (ref 0.3–6.2)
ERYTHROCYTE [DISTWIDTH] IN BLOOD BY AUTOMATED COUNT: 13.5 % (ref 11.7–13)
GFR SERPL CREATININE-BSD FRML MDRD: 77 ML/MIN/1.73
GLUCOSE BLD-MCNC: 159 MG/DL (ref 65–99)
HCT VFR BLD AUTO: 26.1 % (ref 35.6–45.5)
HGB BLD-MCNC: 8.8 G/DL (ref 11.9–15.5)
IMM GRANULOCYTES # BLD: 0.03 10*3/MM3 (ref 0–0.03)
IMM GRANULOCYTES NFR BLD: 0.4 % (ref 0–0.5)
LYMPHOCYTES # BLD AUTO: 1.94 10*3/MM3 (ref 0.9–4.8)
LYMPHOCYTES NFR BLD AUTO: 23.5 % (ref 19.6–45.3)
MCH RBC QN AUTO: 29 PG (ref 26.9–32)
MCHC RBC AUTO-ENTMCNC: 33.7 G/DL (ref 32.4–36.3)
MCV RBC AUTO: 86.1 FL (ref 80.5–98.2)
MONOCYTES # BLD AUTO: 0.76 10*3/MM3 (ref 0.2–1.2)
MONOCYTES NFR BLD AUTO: 9.2 % (ref 5–12)
NEUTROPHILS # BLD AUTO: 5.36 10*3/MM3 (ref 1.9–8.1)
NEUTROPHILS NFR BLD AUTO: 65 % (ref 42.7–76)
NRBC BLD MANUAL-RTO: 0 /100 WBC (ref 0–0)
PLATELET # BLD AUTO: 187 10*3/MM3 (ref 140–500)
PMV BLD AUTO: 10 FL (ref 6–12)
POTASSIUM BLD-SCNC: 4.3 MMOL/L (ref 3.5–5.2)
RBC # BLD AUTO: 3.03 10*6/MM3 (ref 3.9–5.2)
SODIUM BLD-SCNC: 136 MMOL/L (ref 136–145)
WBC NRBC COR # BLD: 8.25 10*3/MM3 (ref 4.5–10.7)

## 2017-02-19 PROCEDURE — 97110 THERAPEUTIC EXERCISES: CPT

## 2017-02-19 PROCEDURE — 80048 BASIC METABOLIC PNL TOTAL CA: CPT | Performed by: INTERNAL MEDICINE

## 2017-02-19 PROCEDURE — 94799 UNLISTED PULMONARY SVC/PX: CPT

## 2017-02-19 PROCEDURE — 25010000002 ENOXAPARIN PER 10 MG: Performed by: ORTHOPAEDIC SURGERY

## 2017-02-19 PROCEDURE — 97150 GROUP THERAPEUTIC PROCEDURES: CPT

## 2017-02-19 PROCEDURE — 85025 COMPLETE CBC W/AUTO DIFF WBC: CPT | Performed by: INTERNAL MEDICINE

## 2017-02-19 RX ORDER — FERROUS SULFATE 325(65) MG
325 TABLET ORAL 2 TIMES DAILY WITH MEALS
Status: DISCONTINUED | OUTPATIENT
Start: 2017-02-19 | End: 2017-02-20 | Stop reason: HOSPADM

## 2017-02-19 RX ADMIN — CARBIDOPA AND LEVODOPA 1.5 TABLET: 25; 100 TABLET ORAL at 09:06

## 2017-02-19 RX ADMIN — DOCUSATE SODIUM -SENNOSIDES 2 TABLET: 50; 8.6 TABLET, COATED ORAL at 18:08

## 2017-02-19 RX ADMIN — ACETAMINOPHEN 650 MG: 325 TABLET ORAL at 05:51

## 2017-02-19 RX ADMIN — CARBIDOPA AND LEVODOPA 1.5 TABLET: 25; 100 TABLET ORAL at 18:08

## 2017-02-19 RX ADMIN — CARBIDOPA AND LEVODOPA 1.5 TABLET: 25; 100 TABLET ORAL at 20:41

## 2017-02-19 RX ADMIN — DOCUSATE SODIUM -SENNOSIDES 2 TABLET: 50; 8.6 TABLET, COATED ORAL at 09:06

## 2017-02-19 RX ADMIN — FERROUS SULFATE TAB 325 MG (65 MG ELEMENTAL FE) 325 MG: 325 (65 FE) TAB at 20:41

## 2017-02-19 RX ADMIN — ACETAMINOPHEN 650 MG: 325 TABLET ORAL at 13:37

## 2017-02-19 RX ADMIN — BISOPROLOL FUMARATE AND HYDROCHLOROTHIAZIDE 1 TABLET: 6.25; 5 TABLET ORAL at 09:06

## 2017-02-19 RX ADMIN — ENOXAPARIN SODIUM 40 MG: 40 INJECTION SUBCUTANEOUS at 09:05

## 2017-02-19 NOTE — PROGRESS NOTES
Acute Care - Physical Therapy Treatment Note  [facility]     Patient Name: Gregoria Jimenez  : 1934  MRN: [id number]  Today's Date: 2017  Onset of Illness/Injury or Date of Surgery Date: 02/15/17     Referring Physician: Alena    Admit Date: 2/15/2017    Visit Dx:    ICD-10-CM ICD-9-CM   1. Difficulty walking R26.2 719.7     Patient Active Problem List   Diagnosis   • Osteoarthritis of left knee               Adult Rehabilitation Note       17 1400 17 0930 17 1500    Rehab Assessment/Intervention    Discipline physical therapist  -SUZANNE physical therapist  -SUZANNE physical therapist  -SUZANNE    Document Type therapy note (daily note)  -SUZANNE therapy note (daily note)  -SUZANNE therapy note (daily note)  -SUZANNE    Subjective Information agree to therapy  -SUZANNE agree to therapy  -SUZANNE agree to therapy  -SUZANNE    Patient Effort, Rehab Treatment good  -SUZANNE good  -SUZANNE good  -SUZANNE    Recorded by [SUZANNE] Megan Howard PT [SUZANNE] Megan Howard, PT [SUZANNE] Megan Howard, PT    Pain Assessment    Pain Assessment 0-10  -SUZANNE 0-10  -SUZANNE 0-10  -SUZANNE    Pain Score 3  -SUZANNE 3  -SUZANNE 3  -SUZANNE    Pain Location Knee  -SUZANNE Knee  -SUZANNE Knee  -SUZANNE    Pain Orientation Left  -SUZANNE Left  -SUZANNE Left  -SUZANNE    Recorded by [SUZANNE] Megan Howard, PT [SUZANNE] Megan Howard, PT [SUZANNE] Megan Howard, PT    Cognitive Assessment/Intervention    Current Cognitive/Communication Assessment functional  -SUZANNE functional  -SUZANNE functional  -SUZANNE    Orientation Status oriented x 4  -SUZANNE oriented x 4  -SUZANNE oriented x 4  -SUZANNE    Personal Safety mild impairment  -SUZANNE mild impairment  -SUZANNE mild impairment  -SUZANNE    Personal Safety Interventions fall prevention program maintained;gait belt;muscle strengthening facilitated;nonskid shoes/slippers when out of bed  -SUZANNE fall prevention program maintained;gait belt;muscle strengthening facilitated;nonskid shoes/slippers when out of bed  -SUZANNE fall prevention program maintained;gait belt;muscle strengthening facilitated;nonskid shoes/slippers when out  of bed  -SUZANNE    Recorded by [SUZANNE] Megan Howard, PT [SUZANNE] Megan Howard, PT [SUZANNE] Megan Howard, PT    ROM (Range of Motion)    General ROM Detail  9-83  -SUZANNE     Recorded by  [SUZANNE] Megan Howard PT     Bed Mobility, Assessment/Treatment    Bed Mob, Supine to Sit, [place] contact guard assist  -SUZANNE minimum assist (75% patient effort);contact guard assist  -SUZANNE minimum assist (75% patient effort);contact guard assist  -SUZANNE    Bed Mob, Sit to Supine, [place] contact guard assist  -SUZANNE minimum assist (75% patient effort);contact guard assist  -SUZANNE minimum assist (75% patient effort);contact guard assist  -SUZANNE    Recorded by [SUZANNE] Megan Howard, PT [SUZANNE] Megan Howard, PT [SUZANNE] Megan Howard, PT    Transfer Assessment/Treatment    Transfers, Sit-Stand [place] contact guard assist;verbal cues required;stand by assist  -SUZANNE contact guard assist;verbal cues required;stand by assist  -SUZANNE contact guard assist;verbal cues required  -SUZANNE    Transfers, Stand-Sit [place] contact guard assist;verbal cues required;stand by assist  -SUZANNE contact guard assist;verbal cues required;stand by assist  -SUZANNE contact guard assist;verbal cues required  -SUZANNE    Transfers, Sit-Stand-Sit, Assist Device rolling walker  -SUZANNE rolling walker  -SUZANNE rolling walker  -SUZANNE    Recorded by [SUZANNE] Megan Howard, PT [SUZANNE] Megan Howard, PT [SUZANNE] Megan Howard, PT    Gait Assessment/Treatment    Gait, [place] Level verbal cues required;stand by assist  -SUZANNE verbal cues required;stand by assist  -SUZANNE minimum assist (75% patient effort);verbal cues required;contact guard assist  -SUZANNE    Gait, Assistive Device rolling walker  -SUZANNE rolling walker  -SUZANNE rolling walker  -SUZANNE    Gait, Distance (Feet) 125  -SUZANNE 125  -SUZANNE 100  -SUZANNE    Gait, Gait Deviations antalgic;marcos decreased;forward flexed posture  -SUZANNE antalgic;marcos decreased;forward flexed posture  -SUZANNE antalgic;mracos decreased;forward flexed posture  -SUZANNE    Recorded by [SUZANNE] Megan ANNA  Anita, PT [SUZANNE] Megan Howard, PT [SUZANNE] Megan Howard, PT    Therapy Exercises    Exercise Protocols total knee  -SUZANNE total knee  -SUZANNE total knee  -SUZANNE    Total Knee Exercises left:;completed protocol;30 reps  -SUZANNE left:;completed protocol;30 reps  -SUZANNE left:;completed protocol;30 reps  -SUZANNE    Recorded by [SUZANNE] Megan Howard PT [SUZANNE] Megan Howard PT [SUZANNE] Megan Hoawrd, PT    Positioning and Restraints    Pre-Treatment Position sitting in chair/recliner  -SUZANNE sitting in chair/recliner  -SUZANNE sitting in chair/recliner  -SUZANNE    Post Treatment Position chair  -SUZANNE chair  -SUZANNE chair  -SUZANNE    In Chair reclined;call light within reach;with family/caregiver  -SUZANNE reclined;call light within reach  -SUZANNE reclined;call light within reach  -SUZANNE    Recorded by [SUZANNE] Megan Howard, RONIT [SUZANNE] Megan Howard, PT [SUZANNE] Megan Howard, PT      02/18/17 0930 02/17/17 1300 02/17/17 0850    Rehab Assessment/Intervention    Discipline physical therapist  -SUZANNE physical therapy assistant  -KEYSHA physical therapy assistant  -KEYSHA    Document Type therapy note (daily note)  -SUZANNE therapy note (daily note)  -KEYSHA therapy note (daily note)  -KEYSHA    Subjective Information agree to therapy  -SUZANNE agree to therapy  -KEYSHA agree to therapy  -KEYSHA    Patient Effort, Rehab Treatment good  -SUZANNE adequate  -KEYSHA adequate  -KEYSHA    Precautions/Limitations  fall precautions  -KEYSHA fall precautions  -KEYSHA    Recorded by [SUZANNE] Megan Howard, PT [KEYSHA] Nic Parikh PTA [KEYSHA] Nic Parikh PTA    Pain Assessment    Pain Assessment 0-10  -SUZANNE 0-10  -KEYSHA 0-10  -KEYSHA    Pain Score 3  -SUZANNE 0  -KEYSHA 8  -KEYSHA    Post Pain Score  3  -KEYSHA     Pain Type  Acute pain;Surgical pain  -KEYSHA Acute pain;Surgical pain  -KEYSHA    Pain Location Knee  -SUZANNE Knee  -KEYSHA Knee  -KEYSHA    Pain Orientation Left  -SUZANNE Left  -KEYSHA Left  -KEYSHA    Pain Intervention(s)  Ambulation/increased activity;Repositioned;Rest  -KEYSHA Ambulation/increased activity;Repositioned;Rest  -KEYSHA    Response to Interventions  tolerated  -KEYSHA tolerated  -KEYSHA     Recorded by [SUZANNE] Megan Howard, PT [KEYSHA] Nic Parikh PTA [KEYSHA] Nic Parikh PTA    Cognitive Assessment/Intervention    Current Cognitive/Communication Assessment functional  -SUZANNE impaired  -KEYSHA impaired  -KEYSHA    Orientation Status oriented x 4  -SUZANNE oriented to;person;situation;place  -KEYSHA oriented to;person;situation  -KEYSHA    Follows Commands/Answers Questions  100% of the time;able to follow single-step instructions  -KEYSHA able to follow single-step instructions;100% of the time  -KEYSHA    Personal Safety mild impairment  -SUZANNE mild impairment  -KEYSHA mild impairment  -KEYSHA    Personal Safety Interventions fall prevention program maintained;gait belt;muscle strengthening facilitated;nonskid shoes/slippers when out of bed  -SUZANNE fall prevention program maintained;gait belt;nonskid shoes/slippers when out of bed  -KEYSHA fall prevention program maintained;gait belt;nonskid shoes/slippers when out of bed  -KEYSHA    Recorded by [SUZANNE] Megan Howard, PT [KEYSHA] Nic Parikh, XAVIER [KEYSHA] Nic Parikh PTA    ROM (Range of Motion)    General ROM   lower extremity range of motion deficits identified  -KEYSHA    General ROM Detail 23-84  -SUZANNE      Recorded by [SUZANNE] Megan Howard, PT  [KEYSHA] Nic Parikh PTA    General LE Assessment    ROM   knee, left: LE ROM deficit  -KEYSHA    ROM Detail   24-85'  -KEYSHA    Recorded by   [KEYSHA] Nic Parikh PTA    Bed Mobility, Assessment/Treatment    Bed Mob, Supine to Sit, [place] minimum assist (75% patient effort);contact guard assist  -SUZANNE not tested  -KEYSHA not tested  -KEYSHA    Bed Mob, Sit to Supine, [place] minimum assist (75% patient effort);contact guard assist  -SUZANNE not tested  -KEYSHA not tested  -KEYSHA    Recorded by [SUZANNE] Megan Howard, PT [KEYSHA] Nic Parikh, XAVIER [KEYSHA] Nic Parikh PTA    Transfer Assessment/Treatment    Transfers, Sit-Stand [place] contact guard assist;verbal cues required  -SUZANNE contact guard assist;verbal cues required  -KEYSHA contact guard assist;verbal cues required  -KEYSHA    Transfers, Stand-Sit  [place] contact guard assist;verbal cues required  -SUZANNE contact guard assist;verbal cues required  -KEYSHA contact guard assist;verbal cues required  -KEYSHA    Transfers, Sit-Stand-Sit, Assist Device rolling walker  -SUZANNE rolling walker  -KEYSHA rolling walker  -KEYSHA    Transfer, Impairments  strength decreased;impaired balance;pain;ROM decreased  -KEYSHA strength decreased;impaired balance;pain;ROM decreased  -KEYSHA    Recorded by [SUZANNE] Megan Howard, PT [KEYSHA] Nic Parikh PTA [KEYSHA] Nic Parikh PTA    Gait Assessment/Treatment    Gait, [place] Level minimum assist (75% patient effort);verbal cues required;contact guard assist  -SUZANNE minimum assist (75% patient effort);verbal cues required;nonverbal cues required (demo/gesture)  -KEYSHA minimum assist (75% patient effort);verbal cues required;nonverbal cues required (demo/gesture)  -KEYSHA    Gait, Assistive Device rolling walker  -SUZANNE rolling walker  -KEYSHA rolling walker  -KEYSHA    Gait, Distance (Feet) 50  -SUZANNE 56  -KEYSHA 50  -KEYSHA    Gait, Gait Deviations antalgic;marcos decreased;forward flexed posture  -SUZANNE forward flexed posture;marcos decreased;left:;antalgic;decreased heel strike;step length decreased;stride length decreased;swing-to-stance ratio decreased;toe-to-floor clearance decreased;weight-shifting ability decreased  -KEYSHA forward flexed posture;marcos decreased;left:;antalgic;decreased heel strike;step length decreased;stride length decreased;swing-to-stance ratio decreased;toe-to-floor clearance decreased;weight-shifting ability decreased  -KEYSHA    Gait, Safety Issues  step length decreased  -KEYSHA step length decreased  -KEYSHA    Gait, Impairments  strength decreased;impaired balance;ROM decreased;pain  -KEYSHA strength decreased;impaired balance;ROM decreased;pain  -KEYSHA    Recorded by [SUZANNE] Megan Howard, PT [KEYSHA] Nic Parikh PTA [KEYSHA] Nic Parikh PTA    Therapy Exercises    Exercise Protocols total knee  -SUZANNE total knee  -KEYSHA total knee  -KEYSHA    Total Knee Exercises left:;completed protocol;30  reps  -SUZANNE left:;completed protocol;25 reps  -KEYSHA left:;20 reps;completed protocol  -KEYSHA    Recorded by [SUZANNE] Megan Howard, PT [KEYSHA] Nic Parikh PTA [KEYSHA] Nic Parikh PTA    Positioning and Restraints    Pre-Treatment Position sitting in chair/recliner  -SUZANNE sitting in chair/recliner  -KEYSHA sitting in chair/recliner  -KEYSHA    Post Treatment Position chair  -SUZANNE chair  -KEYSHA chair  -KEYSHA    In Chair reclined;call light within reach  -SUZANNE reclined;call light within reach;encouraged to call for assist  -KEYSHA reclined;call light within reach;encouraged to call for assist  -KEYSHA    Recorded by [SUZANNE] Megan Howard, PT [KEYSHA] Nic Parikh PTA [KEYSHA] Nic Parikh PTA      User Key  (r) = Recorded By, (t) = Taken By, (c) = Cosigned By    Initials Name Effective Dates    SUZANNE Howard, PT 10/06/15 -     KEYSHA Nic Parikh PTA 04/24/15 -                 IP PT Goals       02/16/17 1003          Transfer Training PT LTG    Transfer Training PT LTG, Date Established 02/16/17  -EE      Transfer Training PT LTG, Time to Achieve 3 days  -EE      Transfer Training PT LTG, Activity Type all transfers  -EE      Transfer Training PT LTG, [place] Level supervision required  -EE      Transfer Training PT LTG, Assist Device walker, standard  -EE      Gait Training PT LTG    Gait Training Goal PT LTG, Date Established 02/16/17  -EE      Gait Training Goal PT LTG, Time to Achieve 3 days  -EE      Gait Training Goal PT LTG, [place] Level contact guard assist  -EE      Gait Training Goal PT LTG, Assist Device walker, standard  -EE      Gait Training Goal PT LTG, Distance to Achieve 75  -EE      Range of Motion PT LTG    Range of Motion Goal PT LTG, Date Established 02/16/17  -EE      Range of Motion Goal PT LTG, Time to Achieve 3 days  -EE      Range fo Motion Goal PT LTG, Joint L knee  -EE      Range of Motion Goal PT LTG, AROM Measure 0-90  -EE      Patient Education PT LTG    Patient Education PT LTG, Date Established 02/16/17  -EE      Patient  Education PT LTG, Time to Achieve 3 days  -EE      Patient Education PT LTG, Education Type HEP  -EE      Patient Education PT LTG, Education Understanding demonstrate adequately;verbalize understanding  -EE        User Key  (r) = Recorded By, (t) = Taken By, (c) = Cosigned By    Initials Name Provider Type    EE Zayra Niño, PT Physical Therapist          Physical Therapy Education     Title: PT OT SLP Therapies (Done)     Topic: Physical Therapy (Done)     Point: Mobility training (Done)    Learning Progress Summary    Learner Readiness Method Response Comment Documented by Status   Patient Acceptance E VU,NR  SUZANNE 02/19/17 1519 Done    Acceptance E VU,NR  SUZANNE 02/18/17 1441 Done    Acceptance E VU,NR  KEYSHA 02/17/17 1621 Done    Acceptance E VU  KEYSHA 02/17/17 1155 Done    Acceptance E VU  KEYSHA 02/16/17 1541 Done    Acceptance E,TB VU,NR  EE 02/16/17 1003 Done               Point: Home exercise program (Done)    Learning Progress Summary    Learner Readiness Method Response Comment Documented by Status   Patient Acceptance E VU,NR  SUZANNE 02/19/17 1519 Done    Acceptance E VU,NR  SUZANNE 02/18/17 1441 Done    Acceptance E VU,NR  KEYSHA 02/17/17 1621 Done    Acceptance E VU  EKYSHA 02/17/17 1155 Done    Acceptance E VU  KEYSHA 02/16/17 1541 Done    Acceptance E,TB VU,NR  EE 02/16/17 1003 Done               Point: Body mechanics (Done)    Learning Progress Summary    Learner Readiness Method Response Comment Documented by Status   Patient Acceptance E VU,NR  KEYSHA 02/17/17 1621 Done    Acceptance E VU  KEYSHA 02/17/17 1155 Done    Acceptance E VU  KEYSHA 02/16/17 1541 Done    Acceptance E,TB VU,NR  EE 02/16/17 1003 Done                      User Key     Initials Effective Dates Name Provider Type Discipline    SUZANNE 10/06/15 -  Megan Howard, PT Physical Therapist PT    EE 12/01/15 -  Zayra Niño, PT Physical Therapist PT    KEYSHA 04/24/15 -  Nic Parikh, XAVIER Physical Therapy Assistant PT                    PT Recommendation and Plan  Anticipated Discharge  Disposition: skilled nursing facility  Planned Therapy Interventions: balance training, bed mobility training, gait training, home exercise program, patient/family education, ROM (Range of Motion), strengthening, stretching, transfer training  PT Frequency: 2 times/day  Plan of Care Review  Plan Of Care Reviewed With: patient  Progress: progress toward functional goals as expected          Outcome Measures       02/19/17 1500 02/18/17 1400 02/17/17 1100    How much help from another person do you currently need...    Turning from your back to your side while in flat bed without using bedrails? 3  -SUZANNE 3  -SUZANNE 3  -KEYSHA    Moving from lying on back to sitting on the side of a flat bed without bedrails? 3  -SUZANNE 3  -SUZANNE 3  -KEYSHA    Moving to and from a bed to a chair (including a wheelchair)? 3  -SUZANNE 3  -SUZANNE 3  -KEYSHA    Standing up from a chair using your arms (e.g., wheelchair, bedside chair)? 3  -SUZANNE 3  -SUZANNE 3  -KEYSHA    Climbing 3-5 steps with a railing? 3  -SUZANNE 3  -SUZANNE 2  -KEYSHA    To walk in hospital room? 3  -SUZANNE 3  -SUZANNE 3  -KEYSHA    AM-PAC 6 Clicks Score 18  -SUZANNE 18  -SUZANNE 17  -KEYSHA    Functional Assessment    Outcome Measure Options AM-PAC 6 Clicks Basic Mobility (PT)  -SUZANNE AM-PAC 6 Clicks Basic Mobility (PT)  -SUZANNE AM-PAC 6 Clicks Basic Mobility (PT)  -KEYSHA      User Key  (r) = Recorded By, (t) = Taken By, (c) = Cosigned By    Initials Name Provider Type    SUZANNE Howard, RONIT Physical Therapist    KEYSHA Parikh PTA Physical Therapy Assistant           Time Calculation:         PT Charges       02/19/17 1614 02/19/17 1521       Time Calculation    Start Time 1400  -SUZANNE 0930  -SUZANNE     Stop Time 1500  -SUZANNE 1030  -SUZANNE     Time Calculation (min) 60 min  -SUZANNE 60 min  -SUZANNE     PT Received On 02/19/17  -SUZANNE 02/19/17  -SUZANNE       User Key  (r) = Recorded By, (t) = Taken By, (c) = Cosigned By    Initials Name Provider Type    SUZANNE Howard, PT Physical Therapist          Therapy Charges for Today     Code Description Service Date Service Provider  Modifiers Qty    55661920580 HC PT THER PROC GROUP 2/18/2017 Megan ANNA Anita, PT GP 1    98093221946 HC PT THER PROC EA 15 MIN 2/18/2017 Megan L Anita, PT GP 1    71417802496 HC PT THER PROC GROUP 2/18/2017 Megan ANNA Anita, PT GP 1    01098789132 HC PT THER PROC EA 15 MIN 2/18/2017 Megan L Anita, PT GP 1    00952346446 HC PT THER PROC GROUP 2/19/2017 Meganattila Howard, PT GP 1    07351408937 HC PT THER PROC EA 15 MIN 2/19/2017 Megan L Anita, PT GP 1    23728021056 HC PT THER PROC GROUP 2/19/2017 Megan WILFRIDO Anita, PT GP 1    66898589345 HC PT THER PROC EA 15 MIN 2/19/2017 Megan WILFRIDO Anita, PT GP 1          PT G-Codes  Outcome Measure Options: AM-PAC 6 Clicks Basic Mobility (PT)    Meganattila Howard, PT  2/19/2017

## 2017-02-19 NOTE — PLAN OF CARE
Problem: Patient Care Overview (Adult)  Goal: Plan of Care Review  Outcome: Ongoing (interventions implemented as appropriate)    02/19/17 1519   Coping/Psychosocial Response Interventions   Plan Of Care Reviewed With patient   Patient Care Overview   Progress progress toward functional goals as expected

## 2017-02-19 NOTE — PROGRESS NOTES
Acute Care - Physical Therapy Treatment Note  [facility]     Patient Name: Gregoria Jimenez  : 1934  MRN: [id number]  Today's Date: 2017  Onset of Illness/Injury or Date of Surgery Date: 02/15/17     Referring Physician: Alena    Admit Date: 2/15/2017    Visit Dx:    ICD-10-CM ICD-9-CM   1. Difficulty walking R26.2 719.7     Patient Active Problem List   Diagnosis   • Osteoarthritis of left knee               Adult Rehabilitation Note       17 0930 17 1500 17    Rehab Assessment/Intervention    Discipline physical therapist  -SUZANNE physical therapist  -SUZANNE physical therapist  -SUZANNE    Document Type therapy note (daily note)  -SUZANNE therapy note (daily note)  -SUZANNE therapy note (daily note)  -SUZANNE    Subjective Information agree to therapy  -SUZANNE agree to therapy  -SUZANNE agree to therapy  -SUZANNE    Patient Effort, Rehab Treatment good  -SUZANNE good  -SUZANNE good  -SUZANNE    Recorded by [SUZANNE] Megan Howard PT [SUZANNE] Megan Howard, PT [SUZANNE] Megan Howard, PT    Pain Assessment    Pain Assessment 0-10  -SUZANNE 0-10  -SUZANNE 0-10  -SUZANNE    Pain Score 3  -SUZANNE 3  -SUZANNE 3  -SUZANNE    Pain Location Knee  -SUZANNE Knee  -SUZANNE Knee  -SUZANNE    Pain Orientation Left  -SUZANNE Left  -SUZANNE Left  -SUZANNE    Recorded by [SUZANNE] Megan Howard, PT [SUZANNE] Megan Howard, PT [SUZANNE] Megan Howard, PT    Cognitive Assessment/Intervention    Current Cognitive/Communication Assessment functional  -SUZANNE functional  -SUZANNE functional  -SUZANNE    Orientation Status oriented x 4  -SUZANNE oriented x 4  -SUZANNE oriented x 4  -SUZANNE    Personal Safety mild impairment  -SUZANNE mild impairment  -SUZANNE mild impairment  -SUZANNE    Personal Safety Interventions fall prevention program maintained;gait belt;muscle strengthening facilitated;nonskid shoes/slippers when out of bed  -SUZANNE fall prevention program maintained;gait belt;muscle strengthening facilitated;nonskid shoes/slippers when out of bed  -SUZANNE fall prevention program maintained;gait belt;muscle strengthening facilitated;nonskid shoes/slippers when out  of bed  -SUZANNE    Recorded by [SUZANNE] Megan Howard, PT [SUZANNE] Megan Howard, PT [SUZANNE] Megan Howard, PT    ROM (Range of Motion)    General ROM Detail 9-83  -SUZANNE  23-84  -SUZANNE    Recorded by [SUZANNE] Megan Howard, PT  [SUZANNE] Megan Howard, PT    Bed Mobility, Assessment/Treatment    Bed Mob, Supine to Sit, [place] minimum assist (75% patient effort);contact guard assist  -SUZANNE minimum assist (75% patient effort);contact guard assist  -SUZANNE minimum assist (75% patient effort);contact guard assist  -SUAZNNE    Bed Mob, Sit to Supine, [place] minimum assist (75% patient effort);contact guard assist  -SUZANNE minimum assist (75% patient effort);contact guard assist  -SUZANNE minimum assist (75% patient effort);contact guard assist  -SUZANNE    Recorded by [SUZANNE] Megan Howard, PT [SUZANNE] Megan Howard, PT [SUZANNE] Megan Howard, PT    Transfer Assessment/Treatment    Transfers, Sit-Stand [place] contact guard assist;verbal cues required;stand by assist  -SUZANNE contact guard assist;verbal cues required  -SUZANNE contact guard assist;verbal cues required  -SUZANNE    Transfers, Stand-Sit [place] contact guard assist;verbal cues required;stand by assist  -SUZANNE contact guard assist;verbal cues required  -SUZANNE contact guard assist;verbal cues required  -SUZANNE    Transfers, Sit-Stand-Sit, Assist Device rolling walker  -SUZANNE rolling walker  -SUZANNE rolling walker  -SUZANNE    Recorded by [SUZANNE] Megan Howard, PT [SUZANNE] Megan Howard, PT [SUZANNE] Megan Howard, PT    Gait Assessment/Treatment    Gait, [place] Level verbal cues required;stand by assist  -SUZANNE minimum assist (75% patient effort);verbal cues required;contact guard assist  -SUZANNE minimum assist (75% patient effort);verbal cues required;contact guard assist  -SUZANNE    Gait, Assistive Device rolling walker  -SUZANNE rolling walker  -SUZANNE rolling walker  -SUZANNE    Gait, Distance (Feet) 125  -SUZANNE 100  -SUZANNE 50  -SUZANNE    Gait, Gait Deviations antalgic;marcos decreased;forward flexed posture  -SUZANNE antalgic;marcos  decreased;forward flexed posture  -SUZANNE antalgic;marcos decreased;forward flexed posture  -SUZANNE    Recorded by [SUZANNE] Megan Howard, PT [SUZANNE] Megan Howard, PT [SUZANNE] Megan Howard, PT    Therapy Exercises    Exercise Protocols total knee  -SUZANNE total knee  -SUZANNE total knee  -SUZANNE    Total Knee Exercises left:;completed protocol;30 reps  -SUZANNE left:;completed protocol;30 reps  -SUZANNE left:;completed protocol;30 reps  -SUZANNE    Recorded by [SUZANNE] Megan Howard, PT [SUZANNE] Megan Howard, PT [SUZANNE] Megan Howard, PT    Positioning and Restraints    Pre-Treatment Position sitting in chair/recliner  -SUZANNE sitting in chair/recliner  -SUZANNE sitting in chair/recliner  -SUZANNE    Post Treatment Position chair  -SUZANNE chair  -SUZANNE chair  -SUZANNE    In Chair reclined;call light within reach  -SUZANNE reclined;call light within reach  -SUZANNE reclined;call light within reach  -SUZANNE    Recorded by [SUZANNE] Megan Howard, PT [SUZANNE] Megan Howard, PT [SUZANNE] Megan Howard, PT      02/17/17 1300 02/17/17 0850       Rehab Assessment/Intervention    Discipline physical therapy assistant  -KEYSHA physical therapy assistant  -KEYSHA     Document Type therapy note (daily note)  -KEYSHA therapy note (daily note)  -KEYSHA     Subjective Information agree to therapy  -KEYSHA agree to therapy  -KEYSHA     Patient Effort, Rehab Treatment adequate  -KEYSHA adequate  -KEYSHA     Precautions/Limitations fall precautions  -KEYSHA fall precautions  -KEYSHA     Recorded by [KEYSHA] Nic Parikh PTA [KEYSHA] Nic Parikh PTA     Pain Assessment    Pain Assessment 0-10  -KEYSHA 0-10  -KEYSHA     Pain Score 0  -KEYSHA 8  -KEYSHA     Post Pain Score 3  -KEYSHA      Pain Type Acute pain;Surgical pain  -KEYSHA Acute pain;Surgical pain  -KEYSHA     Pain Location Knee  -KEYSHA Knee  -KEYSHA     Pain Orientation Left  -KEYSHA Left  -KEYSHA     Pain Intervention(s) Ambulation/increased activity;Repositioned;Rest  -KEYSHA Ambulation/increased activity;Repositioned;Rest  -KEYSHA     Response to Interventions tolerated  -KEYSHA tolerated  -KEYSHA     Recorded by [KEYSHA] Nic Parikh PTA [KEYSHA] Nic Parikh PTA      Cognitive Assessment/Intervention    Current Cognitive/Communication Assessment impaired  -KEYSHA impaired  -KEYSHA     Orientation Status oriented to;person;situation;place  -KEYSHA oriented to;person;situation  -KEYSHA     Follows Commands/Answers Questions 100% of the time;able to follow single-step instructions  -KEYSHA able to follow single-step instructions;100% of the time  -KEYSHA     Personal Safety mild impairment  -KEYSHA mild impairment  -KEYSHA     Personal Safety Interventions fall prevention program maintained;gait belt;nonskid shoes/slippers when out of bed  -KEYSHA fall prevention program maintained;gait belt;nonskid shoes/slippers when out of bed  -KEYSHA     Recorded by [KEYSHA] Nic Parikh PTA [KEYSHA] Nic Parikh PTA     ROM (Range of Motion)    General ROM  lower extremity range of motion deficits identified  -KEYSHA     Recorded by  [KEYSHA] Nic Parikh PTA     General LE Assessment    ROM  knee, left: LE ROM deficit  -KEYSHA     ROM Detail  24-85'  -KEYSHA     Recorded by  [KEYSHA] Nic Parikh PTA     Bed Mobility, Assessment/Treatment    Bed Mob, Supine to Sit, [place] not tested  -KEYSHA not tested  -KEYSHA     Bed Mob, Sit to Supine, [place] not tested  -KEYSHA not tested  -KEYSHA     Recorded by [KEYSHA] Nic Parikh PTA [KEYSHA] Nic Parikh PTA     Transfer Assessment/Treatment    Transfers, Sit-Stand [place] contact guard assist;verbal cues required  -KEYSHA contact guard assist;verbal cues required  -KEYSHA     Transfers, Stand-Sit [place] contact guard assist;verbal cues required  -KEYSHA contact guard assist;verbal cues required  -KEYSHA     Transfers, Sit-Stand-Sit, Assist Device rolling walker  -KEYSHA rolling walker  -KEYSHA     Transfer, Impairments strength decreased;impaired balance;pain;ROM decreased  -KEYSHA strength decreased;impaired balance;pain;ROM decreased  -KEYSHA     Recorded by [KEYSHA] Nic Parikh PTA [KEYSHA] Nic Parikh PTA     Gait Assessment/Treatment    Gait, [place] Level minimum assist (75% patient effort);verbal cues required;nonverbal cues required  (demo/gesture)  -KEYSHA minimum assist (75% patient effort);verbal cues required;nonverbal cues required (demo/gesture)  -KEYSHA     Gait, Assistive Device rolling walker  -KEYSHA rolling walker  -KEYSHA     Gait, Distance (Feet) 56  -KEYSHA 50  -KEYSHA     Gait, Gait Deviations forward flexed posture;marcos decreased;left:;antalgic;decreased heel strike;step length decreased;stride length decreased;swing-to-stance ratio decreased;toe-to-floor clearance decreased;weight-shifting ability decreased  -KEYSHA forward flexed posture;marcos decreased;left:;antalgic;decreased heel strike;step length decreased;stride length decreased;swing-to-stance ratio decreased;toe-to-floor clearance decreased;weight-shifting ability decreased  -KEYSHA     Gait, Safety Issues step length decreased  -KEYSHA step length decreased  -KEYSHA     Gait, Impairments strength decreased;impaired balance;ROM decreased;pain  -KEYSHA strength decreased;impaired balance;ROM decreased;pain  -KEYSHA     Recorded by [KEYSHA] Nic Parikh PTA [KEYHSA] Nic Parikh PTA     Therapy Exercises    Exercise Protocols total knee  -KEYSHA total knee  -KEYSHA     Total Knee Exercises left:;completed protocol;25 reps  -KEYSHA left:;20 reps;completed protocol  -KEYSHA     Recorded by [KEYSHA] Nic Parikh PTA [KEYSHA] Nic Parikh PTA     Positioning and Restraints    Pre-Treatment Position sitting in chair/recliner  -KEYSHA sitting in chair/recliner  -KEYSHA     Post Treatment Position chair  -KEYSHA chair  -KEYSHA     In Chair reclined;call light within reach;encouraged to call for assist  -KEYSHA reclined;call light within reach;encouraged to call for assist  -KEYSHA     Recorded by [KEYSHA] Nic Parikh PTA [KEYSHA] Nic Parikh PTA       User Key  (r) = Recorded By, (t) = Taken By, (c) = Cosigned By    Initials Name Effective Dates    SUZANNE Howard, PT 10/06/15 -     KEYSHA Parikh PTA 04/24/15 -                 IP PT Goals       02/16/17 1003          Transfer Training PT LTG    Transfer Training PT LTG, Date Established 02/16/17  -EE      Transfer Training PT  LTG, Time to Achieve 3 days  -EE      Transfer Training PT LTG, Activity Type all transfers  -EE      Transfer Training PT LTG, [place] Level supervision required  -EE      Transfer Training PT LTG, Assist Device walker, standard  -EE      Gait Training PT LTG    Gait Training Goal PT LTG, Date Established 02/16/17  -EE      Gait Training Goal PT LTG, Time to Achieve 3 days  -EE      Gait Training Goal PT LTG, [place] Level contact guard assist  -EE      Gait Training Goal PT LTG, Assist Device walker, standard  -EE      Gait Training Goal PT LTG, Distance to Achieve 75  -EE      Range of Motion PT LTG    Range of Motion Goal PT LTG, Date Established 02/16/17  -EE      Range of Motion Goal PT LTG, Time to Achieve 3 days  -EE      Range fo Motion Goal PT LTG, Joint L knee  -EE      Range of Motion Goal PT LTG, AROM Measure 0-90  -EE      Patient Education PT LTG    Patient Education PT LTG, Date Established 02/16/17  -EE      Patient Education PT LTG, Time to Achieve 3 days  -EE      Patient Education PT LTG, Education Type HEP  -EE      Patient Education PT LTG, Education Understanding demonstrate adequately;verbalize understanding  -EE        User Key  (r) = Recorded By, (t) = Taken By, (c) = Cosigned By    Initials Name Provider Type    SALVADOR Niño PT Physical Therapist          Physical Therapy Education     Title: PT OT SLP Therapies (Done)     Topic: Physical Therapy (Done)     Point: Mobility training (Done)    Learning Progress Summary    Learner Readiness Method Response Comment Documented by Status   Patient Acceptance E VU,NR  SUZANNE 02/19/17 1519 Done    Acceptance E VU,NR  SUZANNE 02/18/17 1441 Done    Acceptance E VU,NR  KEYSHA 02/17/17 1621 Done    Acceptance E VU  KEYSHA 02/17/17 1155 Done    Acceptance E VU  KEYSHA 02/16/17 1541 Done    Acceptance E,TB VU,NR  EE 02/16/17 1003 Done               Point: Home exercise program (Done)    Learning Progress Summary    Learner Readiness Method Response Comment  Documented by Status   Patient Acceptance E VU,NR  SUZANNE 02/19/17 1519 Done    Acceptance E VU,NR  SUZANNE 02/18/17 1441 Done    Acceptance E VU,NR  KEYSHA 02/17/17 1621 Done    Acceptance E VU  KEYSHA 02/17/17 1155 Done    Acceptance E VU  KEYSHA 02/16/17 1541 Done    Acceptance E,TB VU,NR  EE 02/16/17 1003 Done               Point: Body mechanics (Done)    Learning Progress Summary    Learner Readiness Method Response Comment Documented by Status   Patient Acceptance E VU,NR  KEYSHA 02/17/17 1621 Done    Acceptance E VU  KEYSHA 02/17/17 1155 Done    Acceptance E VU  KEYSHA 02/16/17 1541 Done    Acceptance E,TB VU,NR  EE 02/16/17 1003 Done                      User Key     Initials Effective Dates Name Provider Type Discipline    SUZANNE 10/06/15 -  Megan Howard, PT Physical Therapist PT    EE 12/01/15 -  Zayra Niño, PT Physical Therapist PT    KEYSHA 04/24/15 -  Nic Parikh, PTA Physical Therapy Assistant PT                    PT Recommendation and Plan  Anticipated Discharge Disposition: skilled nursing facility  Planned Therapy Interventions: balance training, bed mobility training, gait training, home exercise program, patient/family education, ROM (Range of Motion), strengthening, stretching, transfer training  PT Frequency: 2 times/day  Plan of Care Review  Plan Of Care Reviewed With: patient  Progress: progress toward functional goals as expected          Outcome Measures       02/19/17 1500 02/18/17 1400 02/17/17 1100    How much help from another person do you currently need...    Turning from your back to your side while in flat bed without using bedrails? 3  -SUZANNE 3  -SUZANNE 3  -KEYSHA    Moving from lying on back to sitting on the side of a flat bed without bedrails? 3  -SUZANNE 3  -SUZANNE 3  -KEYSHA    Moving to and from a bed to a chair (including a wheelchair)? 3  -SUZANNE 3  -SUZANNE 3  -KEYSHA    Standing up from a chair using your arms (e.g., wheelchair, bedside chair)? 3  -SUZANNE 3  -SUZANNE 3  -KEYSHA    Climbing 3-5 steps with a railing? 3  -SUZANNE 3  -SUZANNE 2  -KEYSHA    To walk in  hospital room? 3  -SUZANNE 3  -SUZANNE 3  -KEYSHA    AM-PAC 6 Clicks Score 18  -SUZANNE 18  -SUZANNE 17  -KEYSHA    Functional Assessment    Outcome Measure Options AM-PAC 6 Clicks Basic Mobility (PT)  -SUZANNE AM-PAC 6 Clicks Basic Mobility (PT)  -SUZANNE AM-PAC 6 Clicks Basic Mobility (PT)  -KEYSHA      User Key  (r) = Recorded By, (t) = Taken By, (c) = Cosigned By    Initials Name Provider Type    SUZANNE Howard, PT Physical Therapist    KEYSHA Parikh, XAVIER Physical Therapy Assistant           Time Calculation:         PT Charges       02/19/17 1521          Time Calculation    Start Time 0930  -SUZANNE      Stop Time 1030  -SUZANNE      Time Calculation (min) 60 min  -SUZANNE      PT Received On 02/19/17  -SUZANNE        User Key  (r) = Recorded By, (t) = Taken By, (c) = Cosigned By    Initials Name Provider Type    SUZANNE Howard, PT Physical Therapist          Therapy Charges for Today     Code Description Service Date Service Provider Modifiers Qty    57744000109 HC PT THER PROC GROUP 2/18/2017 Megan Howard, PT GP 1    63041264396 HC PT THER PROC EA 15 MIN 2/18/2017 Megan Howard, PT GP 1    81244061805 HC PT THER PROC GROUP 2/18/2017 Megan Howard, PT GP 1    49633861956 HC PT THER PROC EA 15 MIN 2/18/2017 Megan Howard, PT GP 1    69314802105 HC PT THER PROC GROUP 2/19/2017 Megan Howard, PT GP 1    14931349972 HC PT THER PROC EA 15 MIN 2/19/2017 Megan Howard, PT GP 1          PT G-Codes  Outcome Measure Options: AM-PAC 6 Clicks Basic Mobility (PT)    Megan Howard, PT  2/19/2017

## 2017-02-19 NOTE — PROGRESS NOTES
Procedure(s):  LT TOTAL KNEE ARTHROPLASTY     LOS: 4 days     Subjective :   Complains of pain.    Objective :    Vital signs in last 24 hours:  Vitals:    02/18/17 1548 02/18/17 1949 02/18/17 2345 02/19/17 0250   BP: 159/57 149/65 147/64 157/69   BP Location: Right arm Right arm Left arm Left arm   Patient Position: Sitting Lying Lying Lying   Pulse: 69 70 74 63   Resp: 16 16 16 16   Temp: 97.3 °F (36.3 °C) 97 °F (36.1 °C) 98.3 °F (36.8 °C) 97.8 °F (36.6 °C)   TempSrc: Oral Oral Oral Oral   SpO2: 100% 98% 95% 98%   Weight:       Height:           PHYSICAL EXAM:  Patient is calm, in no acute distress, awake and oriented x 3.  Dressing is clean, dry and intact.  No signs of infection.  Swelling is appropriate in amount.  Ecchymosis is appropriate in amount.  Homans test is negative.  Patient is neurovascularly intact distally.    LABS:    Results from last 7 days  Lab Units 02/19/17  0411   WBC 10*3/mm3 8.25   HEMOGLOBIN g/dL 8.8*   HEMATOCRIT % 26.1*   PLATELETS 10*3/mm3 187       Results from last 7 days  Lab Units 02/19/17  0411   SODIUM mmol/L 136   POTASSIUM mmol/L 4.3   CHLORIDE mmol/L 97*   TOTAL CO2 mmol/L 26.6   BUN mg/dL 21   CREATININE mg/dL 0.72   GLUCOSE mg/dL 159*   CALCIUM mg/dL 8.4*           ASSESSMENT:  Status post Procedure(s):  LT TOTAL KNEE ARTHROPLASTY     Hypokalemia resolved.  Acute blood loss anemia stable    Plan:  Continue Physical Therapy, increase mobility and range of motion as tolerated.  Continue SCDs, Continue Lovenox for DVT prophylaxis while inpatient.  Dispo planning for [facility] tomorrow when bed available.    Gregory Carvajal MD    Date: 2/19/2017  Time: 7:42 AM

## 2017-02-19 NOTE — PLAN OF CARE
Problem: Patient Care Overview (Adult)  Goal: Plan of Care Review  Outcome: Ongoing (interventions implemented as appropriate)    02/19/17 0220   Coping/Psychosocial Response Interventions   Plan Of Care Reviewed With patient   Patient Care Overview   Progress improving   Outcome Evaluation   Outcome Summary/Follow up Plan potassium 4.3, tylenol for pain prevents confusion from occuring, ambulation improving with walker       Goal: Adult Individualization and Mutuality  Outcome: Ongoing (interventions implemented as appropriate)  Goal: Discharge Needs Assessment  Outcome: Ongoing (interventions implemented as appropriate)    Problem: Knee Replacement, Total (Adult)  Goal: Signs and Symptoms of Listed Potential Problems Will be Absent or Manageable (Knee Replacement, Total)  Outcome: Ongoing (interventions implemented as appropriate)    Problem: Fall Risk (Adult)  Goal: Absence of Falls  Outcome: Ongoing (interventions implemented as appropriate)

## 2017-02-19 NOTE — PROGRESS NOTES
LOS: 3 days   Patient Care Team:  Rosanne Bland MD as PCP - General (Geriatric Medicine)    No chief complaint on file.        Subjective   Has a little bit of a throb but not more than that pain-wise. No nausea, no SOA, CP.       Objective     Vital Signs  Temp:  [97 °F (36.1 °C)-99.3 °F (37.4 °C)] 97 °F (36.1 °C)  Heart Rate:  [65-71] 70  Resp:  [16-24] 16  BP: (128-159)/(54-85) 149/65    Physical Exam:  WDWN WF in NAD  Skin warm & dry  Lungs clear  Heart RRR  Ext no edema       Results Review:     I reviewed the patient's new clinical results.    Medication Review:       LABS    Results from last 7 days  Lab Units 02/18/17 0314 02/17/17  0424 02/16/17  0309   SODIUM mmol/L 133* 134* 137   POTASSIUM mmol/L 2.9* 3.5 3.9   CHLORIDE mmol/L 93* 93* 96*   TOTAL CO2 mmol/L 28.2 25.9 25.1   BUN mg/dL 19 17 18   CREATININE mg/dL 0.76 0.76 0.77   GLUCOSE mg/dL 119* 140* 166*   CALCIUM mg/dL 8.7 9.0 8.4*       Results from last 7 days  Lab Units 02/18/17  0314 02/17/17  0424 02/16/17  0309   WBC 10*3/mm3 10.71* 14.45*  --    HEMOGLOBIN g/dL 9.3* 10.9* 11.0*   HEMATOCRIT % 27.0* 31.8* 32.4*   PLATELETS 10*3/mm3 168 170  --                Assessment/Plan     Active Problems:    Osteoarthritis of left knee    2. Postop delirium/toxic encephalopathy - resolved  3. Leukocytosis - likely stress leukocytosis. Continues to improve.  4. HTN  5. Parkinson's disease - on home dose of meds  6. Acute blood loss anemia - cont to monitor  7. Hyperglycemia without h/o DM.  Better again  8. Hypokalemia - on K+ protocol  9. Hyponatremia - cont to monitor        Becca Cuevas MD  02/18/17  8:51 PM      Time:

## 2017-02-19 NOTE — PROGRESS NOTES
"     LOS: 4 days   Patient Care Team:  Rosanne Bland MD as PCP - General (Geriatric Medicine)    No chief complaint on file.        Subjective   Reports that she had more hallucinations last night & is worried about that. Said she woke up around 3 am & thought that she was upside down in the room. She pushed \"the red button\" & it flipped around & she was ok. (I think she was referring to the nurses call button when she said that she \"pushed the red button\").   She said she isn't going to take any tylenol tonight because she's worried that is causing the hallucinations.       Objective     Vital Signs  Temp:  [97 °F (36.1 °C)-98.3 °F (36.8 °C)] 97.1 °F (36.2 °C)  Heart Rate:  [63-74] 66  Resp:  [16] 16  BP: (130-157)/(61-69) 130/61    Physical Exam:  WDWN WF in NAD  Skin warm & dry  Lungs clear  Heart RRR  Ext no edema       Results Review:     I reviewed the patient's new clinical results.    Medication Review:       LABS    Results from last 7 days  Lab Units 02/19/17 0411 02/18/17 2143 02/18/17 0314 02/17/17 0424   SODIUM mmol/L 136  --  133* 134*   POTASSIUM mmol/L 4.3 4.3 2.9* 3.5   CHLORIDE mmol/L 97*  --  93* 93*   TOTAL CO2 mmol/L 26.6  --  28.2 25.9   BUN mg/dL 21  --  19 17   CREATININE mg/dL 0.72  --  0.76 0.76   GLUCOSE mg/dL 159*  --  119* 140*   CALCIUM mg/dL 8.4*  --  8.7 9.0       Results from last 7 days  Lab Units 02/19/17 0411 02/18/17 0314 02/17/17 0424   WBC 10*3/mm3 8.25 10.71* 14.45*   HEMOGLOBIN g/dL 8.8* 9.3* 10.9*   HEMATOCRIT % 26.1* 27.0* 31.8*   PLATELETS 10*3/mm3 187 168 170               Assessment/Plan     Active Problems:    Osteoarthritis of left knee    2. Postop delirium/toxic encephalopathy - pt's mental status is much better than what it was reportedly right after the surgery but she is continuing to have some problems with hallucinations or alterations in her perceptions. I told pt that I don't think the tylenol is causing the problems that she is having with " perceptions or hallucinations, & if she needs something for pain she should take it. I told her that I expected that it would get better once to got to [facility] & got on a more regular day & night schedule & wasn't having the sleep deprivation associated with being in the hospital. She is obviously not confused in the way that most pts in the hospital are because she recognizes that what is going on is not normal & isn't real. She does not have any baseline dementia & these alterations in her perception is very disturbing to her. She reminded me that she has Parkinsons & in fact, I had noticed a much more noticeable tremor in her chin that had not been present previously. I told her that I fully expected that she will get better once she got to [place] & that these symptoms would resolve.  3. Leukocytosis - likely stress leukocytosis. resolved  4. HTN  5. Parkinson's disease - on home dose of meds. She has a noticeable tremor of her chin that I had not noted prior to today.   6. Acute blood loss anemia - cont to monitor. Still drifting down. Started po FeSO4  7. Hyperglycemia without h/o DM.  Better again  8. Hypokalemia - on K+ protocol, resolved  9. Hyponatremia - resolved, cont to monitor        Becca Cuevas MD  02/19/17  6:01 PM      Time:

## 2017-02-20 VITALS
OXYGEN SATURATION: 98 % | DIASTOLIC BLOOD PRESSURE: 68 MMHG | SYSTOLIC BLOOD PRESSURE: 142 MMHG | WEIGHT: 123.44 LBS | TEMPERATURE: 97.2 F | HEIGHT: 64 IN | BODY MASS INDEX: 21.07 KG/M2 | HEART RATE: 66 BPM | RESPIRATION RATE: 18 BRPM

## 2017-02-20 LAB
ANION GAP SERPL CALCULATED.3IONS-SCNC: 11.9 MMOL/L
BASOPHILS # BLD AUTO: 0.03 10*3/MM3 (ref 0–0.2)
BASOPHILS NFR BLD AUTO: 0.4 % (ref 0–1.5)
BUN BLD-MCNC: 19 MG/DL (ref 8–23)
BUN/CREAT SERPL: 24.1 (ref 7–25)
CALCIUM SPEC-SCNC: 8.8 MG/DL (ref 8.6–10.5)
CHLORIDE SERPL-SCNC: 98 MMOL/L (ref 98–107)
CO2 SERPL-SCNC: 29.1 MMOL/L (ref 22–29)
CREAT BLD-MCNC: 0.79 MG/DL (ref 0.57–1)
DEPRECATED RDW RBC AUTO: 44.7 FL (ref 37–54)
EOSINOPHIL # BLD AUTO: 0.17 10*3/MM3 (ref 0–0.7)
EOSINOPHIL NFR BLD AUTO: 2.1 % (ref 0.3–6.2)
ERYTHROCYTE [DISTWIDTH] IN BLOOD BY AUTOMATED COUNT: 13.7 % (ref 11.7–13)
GFR SERPL CREATININE-BSD FRML MDRD: 70 ML/MIN/1.73
GLUCOSE BLD-MCNC: 105 MG/DL (ref 65–99)
HCT VFR BLD AUTO: 26.9 % (ref 35.6–45.5)
HGB BLD-MCNC: 9.1 G/DL (ref 11.9–15.5)
IMM GRANULOCYTES # BLD: 0.04 10*3/MM3 (ref 0–0.03)
IMM GRANULOCYTES NFR BLD: 0.5 % (ref 0–0.5)
LYMPHOCYTES # BLD AUTO: 2.56 10*3/MM3 (ref 0.9–4.8)
LYMPHOCYTES NFR BLD AUTO: 31.5 % (ref 19.6–45.3)
MCH RBC QN AUTO: 30.1 PG (ref 26.9–32)
MCHC RBC AUTO-ENTMCNC: 33.8 G/DL (ref 32.4–36.3)
MCV RBC AUTO: 89.1 FL (ref 80.5–98.2)
MONOCYTES # BLD AUTO: 0.93 10*3/MM3 (ref 0.2–1.2)
MONOCYTES NFR BLD AUTO: 11.4 % (ref 5–12)
NEUTROPHILS # BLD AUTO: 4.4 10*3/MM3 (ref 1.9–8.1)
NEUTROPHILS NFR BLD AUTO: 54.1 % (ref 42.7–76)
PLATELET # BLD AUTO: 227 10*3/MM3 (ref 140–500)
PMV BLD AUTO: 9.7 FL (ref 6–12)
POTASSIUM BLD-SCNC: 3.9 MMOL/L (ref 3.5–5.2)
RBC # BLD AUTO: 3.02 10*6/MM3 (ref 3.9–5.2)
SODIUM BLD-SCNC: 139 MMOL/L (ref 136–145)
WBC NRBC COR # BLD: 8.13 10*3/MM3 (ref 4.5–10.7)

## 2017-02-20 PROCEDURE — 80048 BASIC METABOLIC PNL TOTAL CA: CPT | Performed by: INTERNAL MEDICINE

## 2017-02-20 PROCEDURE — 97110 THERAPEUTIC EXERCISES: CPT

## 2017-02-20 PROCEDURE — 85025 COMPLETE CBC W/AUTO DIFF WBC: CPT | Performed by: INTERNAL MEDICINE

## 2017-02-20 PROCEDURE — 25010000002 ENOXAPARIN PER 10 MG: Performed by: ORTHOPAEDIC SURGERY

## 2017-02-20 PROCEDURE — 97150 GROUP THERAPEUTIC PROCEDURES: CPT

## 2017-02-20 RX ORDER — ACETAMINOPHEN 325 MG/1
325-650 TABLET ORAL EVERY 4 HOURS PRN
Qty: 1 BOTTLE | Refills: 1 | Status: SHIPPED | OUTPATIENT
Start: 2017-02-20

## 2017-02-20 RX ORDER — TRAMADOL HYDROCHLORIDE 50 MG/1
50-100 TABLET ORAL EVERY 4 HOURS PRN
Qty: 56 TABLET | Refills: 0 | Status: SHIPPED | OUTPATIENT
Start: 2017-02-20 | End: 2019-05-07

## 2017-02-20 RX ORDER — SENNA AND DOCUSATE SODIUM 50; 8.6 MG/1; MG/1
2 TABLET, FILM COATED ORAL 2 TIMES DAILY
Qty: 50 TABLET | Refills: 1 | Status: SHIPPED | OUTPATIENT
Start: 2017-02-20

## 2017-02-20 RX ADMIN — DOCUSATE SODIUM -SENNOSIDES 2 TABLET: 50; 8.6 TABLET, COATED ORAL at 07:59

## 2017-02-20 RX ADMIN — ACETAMINOPHEN 650 MG: 325 TABLET ORAL at 14:26

## 2017-02-20 RX ADMIN — CARBIDOPA AND LEVODOPA 1.5 TABLET: 25; 100 TABLET ORAL at 14:26

## 2017-02-20 RX ADMIN — BISOPROLOL FUMARATE AND HYDROCHLOROTHIAZIDE 1 TABLET: 6.25; 5 TABLET ORAL at 07:59

## 2017-02-20 RX ADMIN — ENOXAPARIN SODIUM 40 MG: 40 INJECTION SUBCUTANEOUS at 07:59

## 2017-02-20 RX ADMIN — ACETAMINOPHEN 650 MG: 325 TABLET ORAL at 03:00

## 2017-02-20 RX ADMIN — CARBIDOPA AND LEVODOPA 1.5 TABLET: 25; 100 TABLET ORAL at 07:59

## 2017-02-20 RX ADMIN — FERROUS SULFATE TAB 325 MG (65 MG ELEMENTAL FE) 325 MG: 325 (65 FE) TAB at 07:59

## 2017-02-20 NOTE — THERAPY DISCHARGE NOTE
Acute Care - Physical Therapy Discharge Summary  [facility]       Patient Name: Gregoria Jimenez  : 1934  MRN: [id number]    Today's Date: 2017  Onset of Illness/Injury or Date of Surgery Date: 02/15/17       Referring Physician: Alena      Admit Date: 2/15/2017      PT Recommendation and Plan    Visit Dx:    ICD-10-CM ICD-9-CM   1. Difficulty walking R26.2 719.7             Outcome Measures       17 0900 17 1500 17 1400    How much help from another person do you currently need...    Turning from your back to your side while in flat bed without using bedrails? 3  -EE 3  -SUZANNE 3  -SUZANNE    Moving from lying on back to sitting on the side of a flat bed without bedrails? 3  -EE 3  -SUZANNE 3  -SUZANNE    Moving to and from a bed to a chair (including a wheelchair)? 3  -EE 3  -SUZANNE 3  -SUZANNE    Standing up from a chair using your arms (e.g., wheelchair, bedside chair)? 3  -EE 3  -SUZANNE 3  -SUZANNE    Climbing 3-5 steps with a railing? 3  -EE 3  -SUZANNE 3  -SUZANNE    To walk in hospital room? 3  -EE 3  -SUZANNE 3  -SUZANNE    AM-PAC 6 Clicks Score 18  -EE 18  -SUZANNE 18  -SUZANNE    Functional Assessment    Outcome Measure Options AM-PAC 6 Clicks Basic Mobility (PT)  -EE AM-PAC 6 Clicks Basic Mobility (PT)  -SUZANNE AM-PAC 6 Clicks Basic Mobility (PT)  -SUZANNE      User Key  (r) = Recorded By, (t) = Taken By, (c) = Cosigned By    Initials Name Provider Type    SUZANNE Howard, PT Physical Therapist    EE Zayra Niño, PT Physical Therapist                PT Charges       17 0945          Time Calculation    Start Time 0840  -EE      Stop Time 0905  -EE      Time Calculation (min) 25 min  -EE      PT Received On 17  -EE      PT - Next Appointment 17  -EE        User Key  (r) = Recorded By, (t) = Taken By, (c) = Cosigned By    Initials Name Provider Type    SALVADOR Niño, PT Physical Therapist                  IP PT Goals       17 1537 17 1003       Transfer Training PT LTG    Transfer Training PT LTG, Date Established   02/16/17  -EE     Transfer Training PT LTG, Time to Achieve  3 days  -EE     Transfer Training PT LTG, Activity Type  all transfers  -EE     Transfer Training PT LTG, [place] Level  supervision required  -EE     Transfer Training PT LTG, Assist Device  walker, standard  -EE     Transfer Training PT  LTG, Date Goal Reviewed 02/20/17  -MD      Transfer Training PT LTG, Outcome goal not met  -MD      Transfer Training PT LTG, Reason Goal Not Met discharged from facility  -MD      Gait Training PT LTG    Gait Training Goal PT LTG, Date Established  02/16/17  -EE     Gait Training Goal PT LTG, Time to Achieve  3 days  -EE     Gait Training Goal PT LTG, [place] Level  contact guard assist  -EE     Gait Training Goal PT LTG, Assist Device  walker, standard  -EE     Gait Training Goal PT LTG, Distance to Achieve  75  -EE     Gait Training Goal PT LTG, Date Goal Reviewed 02/20/17  -MD      Gait Training Goal PT LTG, Outcome goal met  -MD      Range of Motion PT LTG    Range of Motion Goal PT LTG, Date Established  02/16/17  -EE     Range of Motion Goal PT LTG, Time to Achieve  3 days  -EE     Range fo Motion Goal PT LTG, Joint  L knee  -EE     Range of Motion Goal PT LTG, AROM Measure  0-90  -EE     Range of Motion Goal PT LTG, Date Goal Reviewed 02/20/17  -MD      Range of Motion Goal PT LTG, Outcome goal not met  -MD      Reason Goal Not Met (ROM) PT LTG discharged from facility  -MD      Patient Education PT LTG    Patient Education PT LTG, Date Established  02/16/17  -EE     Patient Education PT LTG, Time to Achieve  3 days  -EE     Patient Education PT LTG, Education Type  HEP  -EE     Patient Education PT LTG, Education Understanding  demonstrate adequately;verbalize understanding  -EE     Patient Education PT LTG, Date Goal Reviewed 02/20/17  -MD      Patient Education PT LTG Outcome goal not met  -MD      Patient Education PT LTG, Reason Goal Not Met discharged from facility  -MD        User Key  (r) =  Recorded By, (t) = Taken By, (c) = Cosigned By    Initials Name Provider Type    SALVADOR Niño, PT Physical Therapist    MD Lani Powell, PT Physical Therapist              PT Discharge Summary  Reason for Discharge: Discharge from facility  Outcomes Achieved: Refer to plan of care for updates on goals achieved  Discharge Destination: SNF      Lani Powell, PT   2/20/2017

## 2017-02-20 NOTE — DISCHARGE SUMMARY
Discharge Summary    Date of Admission: 2/15/2017  9:35 AM  Date of Discharge:  2/20/2017    Discharge Diagnosis:   Osteoarthritis of left knee [M17.9]  Hypokalemia - resolved  Acute blood loss anemai    PMHX:   Past Medical History   Diagnosis Date   • Arthritis    • History of bronchiectasis      2015   • Hypertension    • Neuropathy    • Parkinson disease        Discharge Disposition  Rehab Facility or Unit (DC - External)    Procedures Performed  Procedure(s):  LT TOTAL KNEE ARTHROPLASTY       Indication for Admission  Patient is a 83 y.o. female admitted after undergoing the above surgical procedure. They were admitted for post-operative pain control, medical management and physical therapy.  They progressed with physical therapy.  Hypokalemia resolved with po potassium protocol.  It was normal at time of discharge.  They were deemed stable for discharge.      Consults:   Consults     Date and Time Order Name Status Description    2/16/2017 0616 Inpatient Consult to Internal Medicine Completed           Discharge Instructions:  Patient is weight bearing as tolerated on the operative leg.  Patient is to progress range of motion and ambulation as tolerated.  Use walker as needed for stability and gait.  May progress to cane as tolerated.  The dressing should be left on knee until post-operative day 7, and then removed.  Dressing is waterproof. Patient may shower.  Use ace wrap as needed for swelling.  Patient will follow-up in the office in 2 weeks.  Call the office at [phone] for any questions or concerns.      Discharge Medications   Gregoria Jimenez   Home Medication Instructions DRE:[id number]    Printed on:02/20/17 0620   Medication Information                      acetaminophen (TYLENOL) 325 MG tablet  Take 1-2 tablets by mouth Every 4 (Four) Hours As Needed for moderate pain (4-6).             bisoprolol-hydrochlorothiazide (ZIAC) 5-6.25 MG per tablet  Take 1 tablet by mouth Every Morning.              carbidopa-levodopa (SINEMET)  MG per tablet  Take 1.5 tablets by mouth 3 (Three) Times a Day.             meloxicam (MOBIC) 15 MG tablet  Take 15 mg by mouth Daily. TO STOP 1 WEEK BEFORE SURGERY             Nebulizer misc  As Needed.             sennosides-docusate sodium (SENOKOT-S) 8.6-50 MG tablet  Take 2 tablets by mouth 2 (Two) Times a Day.             traMADol (ULTRAM) 50 MG tablet  Take 1-2 tablets by mouth Every 4 (Four) Hours As Needed for moderate pain (4-6).                 Discharge Diet:   Diet Instructions     Diet: Regular; Thin Liquids, No Restrictions       Discharge Diet:  Regular   Fluid Consistency:  Thin Liquids, No Restrictions                 Activity at Discharge:   Activity Instructions     Activity as Tolerated           Discharge Activity Restrictions       No driving until cleared by physician                 Follow-up Appointments  No future appointments.  Referrals and Follow-ups to Schedule     Follow-Up    As directed    Follow Up Details:  Follow up with Dr. Bobo in 10-14 days.  Office  [phone]       Referral to Home Health    As directed    Face to Face Visit Date:  2/20/2017   Follow-up Provider for Plan of Care?:  I will be treating the patient on an ongoing basis.  Please send me the Plan of Care for signature.   Follow-up Provider:  GREGORY BOBO   Reason/Clinical Findings:  s/p TKA   Describe mobility limitations that make leaving home difficult:  taxing effort   Nursing/Therapeutic Services Requested:  Physical Therapy   PT orders:  Total joint pathway   Frequency:  1 Week 1                 Test Results Pending at Discharge  none     Gregory Bobo MD  02/20/17,  6:20 AM

## 2017-02-20 NOTE — PLAN OF CARE
Problem: Inpatient Physical Therapy  Goal: Transfer Training Goal 1 LTG- PT  Outcome: Unable to achieve outcome(s) by discharge Date Met:  02/20/17 02/20/17 1537   Transfer Training PT LTG   Transfer Training PT LTG, Date Goal Reviewed 02/20/17   Transfer Training PT LTG, Outcome goal not met   Transfer Training PT LTG, Reason Goal Not Met discharged from facility       Goal: Gait Training Goal LTG- PT  Outcome: Outcome(s) achieved Date Met:  02/20/17 02/20/17 1537   Gait Training PT LTG   Gait Training Goal PT LTG, Date Goal Reviewed 02/20/17   Gait Training Goal PT LTG, Outcome goal met       Goal: Range of Motion Goal LTG- PT  Outcome: Unable to achieve outcome(s) by discharge Date Met:  02/20/17 02/20/17 1537   Range of Motion PT LTG   Range of Motion Goal PT LTG, Date Goal Reviewed 02/20/17   Range of Motion Goal PT LTG, Outcome goal not met   Reason Goal Not Met (ROM) PT LTG discharged from facility       Goal: Patient Education Goal LTG- PT  Outcome: Unable to achieve outcome(s) by discharge Date Met:  02/20/17 02/20/17 1537   Patient Education PT LTG   Patient Education PT LTG, Date Goal Reviewed 02/20/17   Patient Education PT LTG Outcome goal not met   Patient Education PT LTG, Reason Goal Not Met discharged from facility

## 2017-02-20 NOTE — PLAN OF CARE
Problem: Patient Care Overview (Adult)  Goal: Adult Individualization and Mutuality    02/20/17 0341   Individualization   Patient Specific Goals adequate pain control. increase mobility   Patient Specific Interventions offer pain medication when needed, assist with ambulation    Mutuality/Individual Preferences   What Information Would Help Us Give You More Personalized Care? VSS. Pt transfers and amulates with assist x1 and walker. Pain is controlled with Tylenol          Problem: Knee Replacement, Total (Adult)  Goal: Signs and Symptoms of Listed Potential Problems Will be Absent or Manageable (Knee Replacement, Total)  Outcome: Ongoing (interventions implemented as appropriate)    Problem: Fall Risk (Adult)  Goal: Absence of Falls  Outcome: Ongoing (interventions implemented as appropriate)

## 2017-02-20 NOTE — PROGRESS NOTES
Acute Care - Physical Therapy Treatment Note  [facility]     Patient Name: Gregoria Jimenez  : 1934  MRN: [id number]  Today's Date: 2017  Onset of Illness/Injury or Date of Surgery Date: 02/15/17     Referring Physician: Alena    Admit Date: 2/15/2017    Visit Dx:    ICD-10-CM ICD-9-CM   1. Difficulty walking R26.2 719.7     Patient Active Problem List   Diagnosis   • Osteoarthritis of left knee               Adult Rehabilitation Note       17 0840 17 1400 17 0930    Rehab Assessment/Intervention    Discipline physical therapist  -EE physical therapist  -SUZANNE physical therapist  -SUZANNE    Document Type therapy note (daily note)  -EE therapy note (daily note)  -SUZANNE therapy note (daily note)  -SUZANNE    Subjective Information agree to therapy  -EE agree to therapy  -SUZANNE agree to therapy  -SUZANNE    Patient Effort, Rehab Treatment good  -EE good  -SUZANNE good  -SUZANNE    Precautions/Limitations fall precautions  -EE      Recorded by [EE] Zayra Niño, PT [SUZANNE] Megan Howard, PT [SUZANNE] Megan Howard, PT    Pain Assessment    Pain Assessment 0-10  -EE 0-10  -SUZANNE 0-10  -SUZANNE    Pain Score 4  -EE 3  -SUZANNE 3  -SUZANNE    Pain Location Knee  -EE Knee  -SUZANNE Knee  -SUZANNE    Pain Orientation Left  -EE Left  -SUZANNE Left  -SUZANNE    Pain Intervention(s) Repositioned;Ambulation/increased activity  -EE      Response to Interventions tolerated  -EE      Recorded by [EE] Zayra Niño, PT [SUZANNE] Megan Howard, PT [SUZANNE] Megan Howard, PT    Cognitive Assessment/Intervention    Current Cognitive/Communication Assessment functional  -EE functional  -SUZANNE functional  -SUZANNE    Orientation Status oriented x 4  -EE oriented x 4  -SUZANNE oriented x 4  -SUZANNE    Follows Commands/Answers Questions 100% of the time  -EE      Personal Safety WNL/WFL  -EE mild impairment  -SUZANNE mild impairment  -SUZANNE    Personal Safety Interventions fall prevention program maintained;gait belt;muscle strengthening facilitated;nonskid shoes/slippers when out of bed;supervised activity   -EE fall prevention program maintained;gait belt;muscle strengthening facilitated;nonskid shoes/slippers when out of bed  -SUZANNE fall prevention program maintained;gait belt;muscle strengthening facilitated;nonskid shoes/slippers when out of bed  -SUZANNE    Recorded by [EE] Zayra Niño, PT [SUZANNE] Megan Howard, PT [SUZANNE] Megan Howard, PT    ROM (Range of Motion)    General ROM Detail L knee 10-70  -EE  9-83  -SUZANNE    Recorded by [EE] Zayra Niño, PT  [SUZANNE] Megan Howard, PT    Bed Mobility, Assessment/Treatment    Bed Mob, Supine to Sit, [place] not tested  -EE contact guard assist  -SUZANNE minimum assist (75% patient effort);contact guard assist  -SUZANNE    Bed Mob, Sit to Supine, [place] contact guard assist  -EE contact guard assist  -SUZANNE minimum assist (75% patient effort);contact guard assist  -SUZANNE    Recorded by [EE] Zayra Niño, PT [SUZANNE] Megan Howard, PT [SUZANNE] Megan Howard, PT    Transfer Assessment/Treatment    Transfers, Sit-Stand [place] contact guard assist;verbal cues required  -EE contact guard assist;verbal cues required;stand by assist  -SUZANNE contact guard assist;verbal cues required;stand by assist  -SUZANNE    Transfers, Stand-Sit [place] contact guard assist;verbal cues required  -EE contact guard assist;verbal cues required;stand by assist  -SUZANNE contact guard assist;verbal cues required;stand by assist  -SUZANNE    Transfers, Sit-Stand-Sit, Assist Device rolling walker  -EE rolling walker  -SUZANNE rolling walker  -SUZANNE    Recorded by [EE] Zayra Niño, PT [SUZANNE] Mgean Howard, PT [SUZANNE] Megan Howard, PT    Gait Assessment/Treatment    Gait, [place] Level contact guard assist;stand by assist;verbal cues required  -EE verbal cues required;stand by assist  -SUZANNE verbal cues required;stand by assist  -SUZANNE    Gait, Assistive Device rolling walker  -EE rolling walker  -SUZANNE rolling walker  -SUZANNE    Gait, Distance (Feet) 125  -  -SUZANNE 125  -SUZANNE    Gait, Gait Pattern Analysis 3-point gait  -EE      Gait, Gait  Deviations forward flexed posture;marcos decreased;left:;antalgic;decreased heel strike;step length decreased  -EE antalgic;marcos decreased;forward flexed posture  -SUZANNE antalgic;marcos decreased;forward flexed posture  -SUZANNE    Gait, Safety Issues step length decreased  -EE      Recorded by [EE] Zayra Niño, PT [SUZANNE] Megan Howard, PT [SUZANNE] Megan Howard, PT    Therapy Exercises    Exercise Protocols total knee  -EE total knee  -SUZANNE total knee  -SUZANNE    Total Knee Exercises left:;completed protocol;30 reps  -EE left:;completed protocol;30 reps  -SUZANNE left:;completed protocol;30 reps  -SUZANNE    Recorded by [EE] Zayra Niño, PT [SUZANNE] Megan Howard, PT [SUZANNE] Megan Howard, PT    Positioning and Restraints    Pre-Treatment Position sitting in chair/recliner  -EE sitting in chair/recliner  -SUZANNE sitting in chair/recliner  -SUZANNE    Post Treatment Position bed  -EE chair  -SUZANNE chair  -SUZANNE    In Bed supine;call light within reach;encouraged to call for assist  -EE      In Chair  reclined;call light within reach;with family/caregiver  -SUZANNE reclined;call light within reach  -SUZANNE    Recorded by [EE] Zayra Niño, RONIT [SUZANNE] Megan Howard, PT [SUZANNE] Megan Howard, PT      02/18/17 1500 02/18/17 0930 02/17/17 1300    Rehab Assessment/Intervention    Discipline physical therapist  -SUZANNE physical therapist  -SUZANNE physical therapy assistant  -KEYSHA    Document Type therapy note (daily note)  -SUZANNE therapy note (daily note)  -SUZANNE therapy note (daily note)  -KEYSHA    Subjective Information agree to therapy  -SUZANNE agree to therapy  -SUZANNE agree to therapy  -KEYSHA    Patient Effort, Rehab Treatment good  -SUZANNE good  -SUZANNE adequate  -KEYSHA    Precautions/Limitations   fall precautions  -KEYSHA    Recorded by [SUZANNE] Megan Howard, PT [SUZANNE] Megan Howard, PT [KEYSHA] Nic Parikh PTA    Pain Assessment    Pain Assessment 0-10  -SUZANNE 0-10  -SUZANNE 0-10  -KEYSHA    Pain Score 3  -SUZANNE 3  -SUZANNE 0  -KEYSHA    Post Pain Score   3  -KEYSHA    Pain Type   Acute pain;Surgical pain  -KEYSHA    Pain Location Knee   -SUZANNE Knee  -SUZANNE Knee  -KEYSHA    Pain Orientation Left  -SUZANNE Left  -SUZANNE Left  -KEYSHA    Pain Intervention(s)   Ambulation/increased activity;Repositioned;Rest  -KEYSHA    Response to Interventions   tolerated  -KEYSHA    Recorded by [SUZANNE] Megan Howard PT [SUZANNE] Megan Howard PT [KEYSHA] Nic Parikh, XAVIER    Cognitive Assessment/Intervention    Current Cognitive/Communication Assessment functional  -SUZANNE functional  -SUZANNE impaired  -KEYSHA    Orientation Status oriented x 4  -SUZANNE oriented x 4  -SUZANNE oriented to;person;situation;place  -KEYSHA    Follows Commands/Answers Questions   100% of the time;able to follow single-step instructions  -KEYSHA    Personal Safety mild impairment  -SUZANNE mild impairment  -SUZANNE mild impairment  -KEYSHA    Personal Safety Interventions fall prevention program maintained;gait belt;muscle strengthening facilitated;nonskid shoes/slippers when out of bed  -SUZANNE fall prevention program maintained;gait belt;muscle strengthening facilitated;nonskid shoes/slippers when out of bed  -SUZANNE fall prevention program maintained;gait belt;nonskid shoes/slippers when out of bed  -KEYSHA    Recorded by [SUZANNE] Megan Howard, PT [SUZANNE] Megan Howard, PT [KEYSHA] Nic Parikh, PTA    ROM (Range of Motion)    General ROM Detail  23-84  -SUZANNE     Recorded by  [SUZANNE] Megan Howard PT     Bed Mobility, Assessment/Treatment    Bed Mob, Supine to Sit, [place] minimum assist (75% patient effort);contact guard assist  -SUZANNE minimum assist (75% patient effort);contact guard assist  -SUZANNE not tested  -KEYSHA    Bed Mob, Sit to Supine, [place] minimum assist (75% patient effort);contact guard assist  -SUZANNE minimum assist (75% patient effort);contact guard assist  -SUZANNE not tested  -KEYSHA    Recorded by [SUZANNE] Megan Howard, PT [SUZANNE] Megan Howard, PT [KEYSHA] Nic Parikh, XAVIER    Transfer Assessment/Treatment    Transfers, Sit-Stand [place] contact guard assist;verbal cues required  -SUZANNE contact guard assist;verbal cues required  -SUZANNE contact guard assist;verbal cues  required  -KEYSHA    Transfers, Stand-Sit [place] contact guard assist;verbal cues required  -SUZANNE contact guard assist;verbal cues required  -SUZANNE contact guard assist;verbal cues required  -KEYSHA    Transfers, Sit-Stand-Sit, Assist Device rolling walker  -SUZANNE rolling walker  -SUZANNE rolling walker  -KEYSHA    Transfer, Impairments   strength decreased;impaired balance;pain;ROM decreased  -KEYSHA    Recorded by [SUZANNE] Megan Howard, PT [SUZANNE] Megan Howard, PT [KEYSHA] Nic Parikh PTA    Gait Assessment/Treatment    Gait, [place] Level minimum assist (75% patient effort);verbal cues required;contact guard assist  -SUZANNE minimum assist (75% patient effort);verbal cues required;contact guard assist  -SUZANNE minimum assist (75% patient effort);verbal cues required;nonverbal cues required (demo/gesture)  -KEYSHA    Gait, Assistive Device rolling walker  -SUZANNE rolling walker  -SUZANNE rolling walker  -KEYSHA    Gait, Distance (Feet) 100  -SUZANNE 50  -SUZANNE 56  -KEYSHA    Gait, Gait Deviations antalgic;marcos decreased;forward flexed posture  -SUZANNE antalgic;marcos decreased;forward flexed posture  -SUZANNE forward flexed posture;marcos decreased;left:;antalgic;decreased heel strike;step length decreased;stride length decreased;swing-to-stance ratio decreased;toe-to-floor clearance decreased;weight-shifting ability decreased  -KEYSHA    Gait, Safety Issues   step length decreased  -KEYSHA    Gait, Impairments   strength decreased;impaired balance;ROM decreased;pain  -KEYSHA    Recorded by [SUZANNE] Megan oHward, PT [SUZANNE] Megan Howard, PT [KEYSHA] Nic Parikh PTA    Therapy Exercises    Exercise Protocols total knee  -SUZANNE total knee  -SUZANNE total knee  -KEYSHA    Total Knee Exercises left:;completed protocol;30 reps  -SUZANNE left:;completed protocol;30 reps  -SUZANNE left:;completed protocol;25 reps  -KEYSHA    Recorded by [SUZANNE] Megan Howard, PT [SUZANNE] Megan Howard, PT [KEYSHA] Nic Parikh PTA    Positioning and Restraints    Pre-Treatment Position sitting in chair/recliner  -SUZANNE sitting in chair/recliner   -SUZANNE sitting in chair/recliner  -KEYSHA    Post Treatment Position chair  -SUZANNE chair  -SUZANNE chair  -KEYSHA    In Chair reclined;call light within reach  -SUZANNE reclined;call light within reach  -SUZANNE reclined;call light within reach;encouraged to call for assist  -KEYSHA    Recorded by [SUZANNE] Megan Howard, PT [SUZANNE] Megan Howard, PT [KEYSHA] Nic Parikh, PTA      User Key  (r) = Recorded By, (t) = Taken By, (c) = Cosigned By    Initials Name Effective Dates    SUZANNE Howard, PT 10/06/15 -     EE Zayra Niño, PT 12/01/15 -     KEYSHA Nic Parikh, PTA 04/24/15 -                 IP PT Goals       02/16/17 1003          Transfer Training PT LTG    Transfer Training PT LTG, Date Established 02/16/17  -EE      Transfer Training PT LTG, Time to Achieve 3 days  -EE      Transfer Training PT LTG, Activity Type all transfers  -EE      Transfer Training PT LTG, [place] Level supervision required  -EE      Transfer Training PT LTG, Assist Device walker, standard  -EE      Gait Training PT LTG    Gait Training Goal PT LTG, Date Established 02/16/17  -EE      Gait Training Goal PT LTG, Time to Achieve 3 days  -EE      Gait Training Goal PT LTG, [place] Level contact guard assist  -EE      Gait Training Goal PT LTG, Assist Device walker, standard  -EE      Gait Training Goal PT LTG, Distance to Achieve 75  -EE      Range of Motion PT LTG    Range of Motion Goal PT LTG, Date Established 02/16/17  -EE      Range of Motion Goal PT LTG, Time to Achieve 3 days  -EE      Range fo Motion Goal PT LTG, Joint L knee  -EE      Range of Motion Goal PT LTG, AROM Measure 0-90  -EE      Patient Education PT LTG    Patient Education PT LTG, Date Established 02/16/17  -EE      Patient Education PT LTG, Time to Achieve 3 days  -EE      Patient Education PT LTG, Education Type HEP  -EE      Patient Education PT LTG, Education Understanding demonstrate adequately;verbalize understanding  -EE        User Key  (r) = Recorded By, (t) = Taken By, (c) = Cosigned  By    Initials Name Provider Type    EE Zayra Niño, PT Physical Therapist          Physical Therapy Education     Title: PT OT SLP Therapies (Done)     Topic: Physical Therapy (Done)     Point: Mobility training (Done)    Learning Progress Summary    Learner Readiness Method Response Comment Documented by Status   Patient Acceptance E,TB VU,NR  EE 02/20/17 1022 Done    Acceptance E VU,NR  SUZANNE 02/19/17 1519 Done    Acceptance E VU,NR  SUZANNE 02/18/17 1441 Done    Acceptance E VU,NR  KEYSHA 02/17/17 1621 Done    Acceptance E VU  KEYSHA 02/17/17 1155 Done    Acceptance E VU  KEYSHA 02/16/17 1541 Done    Acceptance E,TB VU,NR  EE 02/16/17 1003 Done               Point: Home exercise program (Done)    Learning Progress Summary    Learner Readiness Method Response Comment Documented by Status   Patient Acceptance E,TB VU,NR  EE 02/20/17 1022 Done    Acceptance E VU,NR  SUZANNE 02/19/17 1519 Done    Acceptance E VU,NR  SUZANNE 02/18/17 1441 Done    Acceptance E VU,NR  KEYSHA 02/17/17 1621 Done    Acceptance E VU  KEYSHA 02/17/17 1155 Done    Acceptance E VU  KEYSHA 02/16/17 1541 Done    Acceptance E,TB VU,NR  EE 02/16/17 1003 Done               Point: Body mechanics (Done)    Learning Progress Summary    Learner Readiness Method Response Comment Documented by Status   Patient Acceptance E,TB VU,NR  EE 02/20/17 1022 Done    Acceptance E VU,NR  KEYSHA 02/17/17 1621 Done    Acceptance E VU  KEYSHA 02/17/17 1155 Done    Acceptance E VU  KEYSHA 02/16/17 1541 Done    Acceptance E,TB VU,NR  EE 02/16/17 1003 Done                      User Key     Initials Effective Dates Name Provider Type Discipline    SUZANNE 10/06/15 -  Megan Howard, PT Physical Therapist PT    EE 12/01/15 -  Zayra Niño, PT Physical Therapist PT    KEYSHA 04/24/15 -  Nic Parikh PTA Physical Therapy Assistant PT                    PT Recommendation and Plan  Anticipated Discharge Disposition: skilled nursing facility  Planned Therapy Interventions: balance training, bed mobility training, gait training, home  exercise program, patient/family education, ROM (Range of Motion), strengthening, stretching, transfer training  PT Frequency: 2 times/day  Plan of Care Review  Plan Of Care Reviewed With: patient  Progress: improving  Outcome Summary/Follow up Plan: Pt able to maintain activity level and gait distance; good tolerance of increased exercises. No new PT concerns at this time.           Outcome Measures       02/20/17 0900 02/19/17 1500 02/18/17 1400    How much help from another person do you currently need...    Turning from your back to your side while in flat bed without using bedrails? 3  -EE 3  -SUZANNE 3  -SUZANNE    Moving from lying on back to sitting on the side of a flat bed without bedrails? 3  -EE 3  -SUZANNE 3  -SUZANNE    Moving to and from a bed to a chair (including a wheelchair)? 3  -EE 3  -SUZANNE 3  -SUZANNE    Standing up from a chair using your arms (e.g., wheelchair, bedside chair)? 3  -EE 3  -SUZANNE 3  -SUZANNE    Climbing 3-5 steps with a railing? 3  -EE 3  -SUZANNE 3  -SUZANNE    To walk in hospital room? 3  -EE 3  -SUZANNE 3  -SUZANNE    AM-PAC 6 Clicks Score 18  -EE 18  -SUZANNE 18  -SUZANNE    Functional Assessment    Outcome Measure Options AM-PAC 6 Clicks Basic Mobility (PT)  -EE AM-PAC 6 Clicks Basic Mobility (PT)  -SUZANNE AM-PAC 6 Clicks Basic Mobility (PT)  -SUZANNE      02/17/17 1100          How much help from another person do you currently need...    Turning from your back to your side while in flat bed without using bedrails? 3  -KEYSHA      Moving from lying on back to sitting on the side of a flat bed without bedrails? 3  -KEYSHA      Moving to and from a bed to a chair (including a wheelchair)? 3  -KEYSHA      Standing up from a chair using your arms (e.g., wheelchair, bedside chair)? 3  -KEYSHA      Climbing 3-5 steps with a railing? 2  -KEYSHA      To walk in hospital room? 3  -KEYSHA      AM-PAC 6 Clicks Score 17  -KEYSHA      Functional Assessment    Outcome Measure Options AM-PAC 6 Clicks Basic Mobility (PT)  -KEYSHA        User Key  (r) = Recorded By, (t) = Taken By, (c) =  Cosigned By    Initials Name Provider Type    SUZANNE Howard, PT Physical Therapist    EE Zayra Niño, PT Physical Therapist    KEYSHA Parikh, PTA Physical Therapy Assistant           Time Calculation:         PT Charges       02/20/17 0945          Time Calculation    Start Time 0840  -EE      Stop Time 0905  -EE      Time Calculation (min) 25 min  -EE      PT Received On 02/20/17  -EE      PT - Next Appointment 02/20/17  -EE        User Key  (r) = Recorded By, (t) = Taken By, (c) = Cosigned By    Initials Name Provider Type    SALVADOR Niño, PT Physical Therapist          Therapy Charges for Today     Code Description Service Date Service Provider Modifiers Qty    88239251575 HC PT THER PROC EA 15 MIN 2/20/2017 Zayra Niño, PT GP 1    08807624750 HC PT THER PROC GROUP 2/20/2017 Zayra Niño, PT GP 1          PT G-Codes  Outcome Measure Options: AM-PAC 6 Clicks Basic Mobility (PT)    Zayra Niño PT  2/20/2017

## 2017-02-20 NOTE — PROGRESS NOTES
Procedure(s):  LT TOTAL KNEE ARTHROPLASTY     LOS: 5 days     Subjective :   States pain isn't that bad.  It is controlled with tylenol.    Objective :    Vital signs in last 24 hours:  Vitals:    02/19/17 1935 02/19/17 2252 02/20/17 0313 02/20/17 0400   BP: 143/63 141/61 160/68 130/60   BP Location: Left arm Left arm Left arm Left arm   Patient Position: Lying Lying Lying    Pulse: 69 67 69    Resp: 18 18 18    Temp: 96.9 °F (36.1 °C) 96.9 °F (36.1 °C) 97.8 °F (36.6 °C)    TempSrc: Oral Oral Oral    SpO2: 97% 99% 97%    Weight:       Height:           PHYSICAL EXAM:  Patient is calm, in no acute distress, awake and oriented x 3.  Dressing is clean, dry and intact.  No signs of infection.  Swelling is appropriate in amount.  Ecchymosis is appropriate in amount.  Homans test is negative.  Patient is neurovascularly intact distally.    LABS:    Results from last 7 days  Lab Units 02/20/17  0339   WBC 10*3/mm3 8.13   HEMOGLOBIN g/dL 9.1*   HEMATOCRIT % 26.9*   PLATELETS 10*3/mm3 227       Results from last 7 days  Lab Units 02/20/17  0339   SODIUM mmol/L 139   POTASSIUM mmol/L 3.9   CHLORIDE mmol/L 98   TOTAL CO2 mmol/L 29.1*   BUN mg/dL 19   CREATININE mg/dL 0.79   GLUCOSE mg/dL 105*   CALCIUM mg/dL 8.8           ASSESSMENT:  Status post Procedure(s):  LT TOTAL KNEE ARTHROPLASTY      Plan:  Continue Physical Therapy, increase mobility and range of motion as tolerated.  Continue SCDs, Continue Aspirin for DVT prophylaxis while inpatient.  Dispo planning for rehab today.    Gregory Carvajal MD    Date: 2/20/2017  Time: 6:11 AM

## 2017-02-20 NOTE — PLAN OF CARE
Problem: Patient Care Overview (Adult)  Goal: Plan of Care Review  Outcome: Ongoing (interventions implemented as appropriate)    02/20/17 0938   Coping/Psychosocial Response Interventions   Plan Of Care Reviewed With patient   Patient Care Overview   Progress progress toward functional goals as expected   Outcome Evaluation   Outcome Summary/Follow up Plan pt doing well with therapy. vss. dressing c/d/i. pain is controlled with tylenol. labs wnl. pt to be d/c'd to rehab today.        Goal: Adult Individualization and Mutuality  Outcome: Ongoing (interventions implemented as appropriate)  Goal: Discharge Needs Assessment  Outcome: Ongoing (interventions implemented as appropriate)    Problem: Knee Replacement, Total (Adult)  Goal: Signs and Symptoms of Listed Potential Problems Will be Absent or Manageable (Knee Replacement, Total)  Outcome: Ongoing (interventions implemented as appropriate)    Problem: Fall Risk (Adult)  Goal: Identify Related Risk Factors and Signs and Symptoms  Outcome: Ongoing (interventions implemented as appropriate)  Goal: Absence of Falls  Outcome: Ongoing (interventions implemented as appropriate)

## 2017-02-20 NOTE — PLAN OF CARE
Problem: Patient Care Overview (Adult)  Goal: Plan of Care Review    02/20/17 0944   Coping/Psychosocial Response Interventions   Plan Of Care Reviewed With patient   Patient Care Overview   Progress improving   Outcome Evaluation   Outcome Summary/Follow up Plan Pt able to maintain activity level and gait distance; good tolerance of increased exercises. No new PT concerns at this time.

## 2017-02-23 NOTE — OP NOTE
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      POSTOPERATIVE DIAGNOSIS: Left total knee arthroplasty.     SURGEON: Gregory Carvajal MD    ASSISTANT: ESTELA Cheng     ANESTHESIA: General endotracheal anesthesia.     COMPLICATIONS: None.     COUNTS: Sponge and needle counts correct at the conclusion of case.      DESCRIPTION OF PROCEDURE: After the patient was identified in the preoperative holding area, the operative site was marked and confirmed. The patient was brought to the operating room on a stretcher, placed supine on the operating table. General endotracheal anesthesia was placed uneventfully. A nonsterile tourniquet was placed on the left thigh. It was prepped and draped in the usual sterile fashion. An Esmarch was used to exsanguinate and the tourniquet was inflated  to 250 mmHg. Then, a midline incision was made with a #10 blade scalpel. Medial parapatellar arthrotomy was completed with a #10 blade scalpel. The medial knee joint was elevated subperiosteally with electrocautery and a De Leon. The inferior fat pad was resected with Fernandez scissors and a rongeur. The patella was everted 90° with a towel clip and oscillating saw was used to complete the ostomy cut. A size 32 patella was selected and the peg holes were drilled for the size. The patella was then everted off the side of the femur with the protection plate in place. The Whitesides line was drawn on the distal femur with electrocautery. The intramedullary drill was placed in the distal femur. The intramedullary distal femoral block with cutting guide set at 6° of femoral valgus and then pinned in place. The osteotomy saw was used to complete the osteotomy cut. Then, the posterior referencing  guide was used for the constrained collar  design implant. It was then sized and pinned and the oscillating saw was used to complete the 4-in-1 cuts. Following this, the surgery intramedullary cutting guide was then placed in the tibia and the osteotomy saw was used to complete the osteotomy cut. Then, the tibial baseplate for the stemmed tibia was then placed, pinned, and the tibia was then prepared using standard drill and broaching technique. Then, trial reduction was perform for an LCCK femur, stemmed tibia, and polyethylene. After multiple attempts, I was unable to get equal balancing of both flexion and extension. We attempted to upsize the femur to try to increase the flexion stability. However, still with this I was unable to get adequate stability; so, the decision was made to convert to a rotating platform hinge design construct. The box cut was refreshed with reciprocating saw as well as the tibia was then re-prepared for repair using a rotating platform tibia design. Trial reduction was then performed and found to be stable in all planes. The patella tracked nicely in trochlear groove. Then, the trial implants were removed. The canals were irrigated. The tibial canal was cemented in place but the femoral canal had a press-fit. The femoral component itself was cemented in place. The final polyethylene component was also placed. The knee was placed in extension. The cement was allowed to fully harden. Then, the wound was copiously irrigated with 3 L of Betadine-laced normal saline. The arthrotomy was closed with #1 Vicryl in an interrupted fashion with the knee was flexed to 90°, then 2-0 Vicryl to close the deep dermis and 3-0 Monocryl followed by Dermabond skin glue to close the skin. Sterile dressing applied. She was awakened from anesthesia and returned to recovery room in stable condition. No intraoperative complications.         Gregory Carvajal M.D. DR:rachele  D:   02/22/2017 18:57:50  T:   02/22/2017 19:39:36  Job ID:   [id number]  Document ID:    [id number]  cc:

## 2019-05-07 ENCOUNTER — HOSPITAL ENCOUNTER (OUTPATIENT)
Dept: GENERAL RADIOLOGY | Facility: HOSPITAL | Age: 84
Discharge: HOME | End: 2019-05-07

## 2019-05-07 ENCOUNTER — APPOINTMENT (OUTPATIENT)
Dept: PREADMISSION TESTING | Facility: HOSPITAL | Age: 84
End: 2019-05-07

## 2019-05-07 ENCOUNTER — HOSPITAL ENCOUNTER (OUTPATIENT)
Dept: GENERAL RADIOLOGY | Facility: HOSPITAL | Age: 84
Discharge: HOME | End: 2019-05-07
Admitting: ORTHOPAEDIC SURGERY

## 2019-05-07 VITALS
TEMPERATURE: 97.7 F | HEIGHT: 61 IN | BODY MASS INDEX: 26.89 KG/M2 | WEIGHT: 142.44 LBS | RESPIRATION RATE: 16 BRPM | HEART RATE: 62 BPM

## 2019-05-07 LAB
ANION GAP SERPL CALCULATED.3IONS-SCNC: 12.7 MMOL/L
BACTERIA UR QL AUTO: NORMAL /HPF
BILIRUB UR QL STRIP: NEGATIVE
BUN BLD-MCNC: 23 MG/DL (ref 8–23)
BUN/CREAT SERPL: 21.1 (ref 7–25)
CALCIUM SPEC-SCNC: 9.3 MG/DL (ref 8.6–10.5)
CHLORIDE SERPL-SCNC: 99 MMOL/L (ref 98–107)
CLARITY UR: CLEAR
CO2 SERPL-SCNC: 26.3 MMOL/L (ref 22–29)
COLOR UR: YELLOW
CREAT BLD-MCNC: 1.09 MG/DL (ref 0.57–1)
DEPRECATED RDW RBC AUTO: 42.3 FL (ref 37–54)
ERYTHROCYTE [DISTWIDTH] IN BLOOD BY AUTOMATED COUNT: 13.2 % (ref 12.3–15.4)
GFR SERPL CREATININE-BSD FRML MDRD: 48 ML/MIN/1.73
GLUCOSE BLD-MCNC: 103 MG/DL (ref 65–99)
GLUCOSE UR STRIP-MCNC: NEGATIVE MG/DL
HCT VFR BLD AUTO: 39 % (ref 34–46.6)
HGB BLD-MCNC: 13 G/DL (ref 12–15.9)
HGB UR QL STRIP.AUTO: NEGATIVE
HYALINE CASTS UR QL AUTO: NORMAL /LPF
KETONES UR QL STRIP: ABNORMAL
LEUKOCYTE ESTERASE UR QL STRIP.AUTO: ABNORMAL
MCH RBC QN AUTO: 29 PG (ref 26.6–33)
MCHC RBC AUTO-ENTMCNC: 33.3 G/DL (ref 31.5–35.7)
MCV RBC AUTO: 86.9 FL (ref 79–97)
NITRITE UR QL STRIP: NEGATIVE
PH UR STRIP.AUTO: 6 [PH] (ref 5–8)
PLATELET # BLD AUTO: 238 10*3/MM3 (ref 140–450)
PMV BLD AUTO: 11.1 FL (ref 6–12)
POTASSIUM BLD-SCNC: 3.8 MMOL/L (ref 3.5–5.2)
PROT UR QL STRIP: NEGATIVE
RBC # BLD AUTO: 4.49 10*6/MM3 (ref 3.77–5.28)
RBC # UR: NORMAL /HPF
REF LAB TEST METHOD: NORMAL
SODIUM BLD-SCNC: 138 MMOL/L (ref 136–145)
SP GR UR STRIP: 1.02 (ref 1–1.03)
SQUAMOUS #/AREA URNS HPF: NORMAL /HPF
UROBILINOGEN UR QL STRIP: ABNORMAL
WBC NRBC COR # BLD: 9.15 10*3/MM3 (ref 3.4–10.8)
WBC UR QL AUTO: NORMAL /HPF

## 2019-05-07 PROCEDURE — 93005 ELECTROCARDIOGRAM TRACING: CPT

## 2019-05-07 PROCEDURE — 73560 X-RAY EXAM OF KNEE 1 OR 2: CPT

## 2019-05-07 PROCEDURE — 93010 ELECTROCARDIOGRAM REPORT: CPT | Performed by: INTERNAL MEDICINE

## 2019-05-07 PROCEDURE — 71046 X-RAY EXAM CHEST 2 VIEWS: CPT

## 2019-05-07 PROCEDURE — 85027 COMPLETE CBC AUTOMATED: CPT | Performed by: ORTHOPAEDIC SURGERY

## 2019-05-07 PROCEDURE — 36415 COLL VENOUS BLD VENIPUNCTURE: CPT

## 2019-05-07 PROCEDURE — 81001 URINALYSIS AUTO W/SCOPE: CPT | Performed by: ORTHOPAEDIC SURGERY

## 2019-05-07 PROCEDURE — A9270 NON-COVERED ITEM OR SERVICE: HCPCS | Performed by: ORTHOPAEDIC SURGERY

## 2019-05-07 PROCEDURE — 63710000001 MUPIROCIN 2 % OINTMENT: Performed by: ORTHOPAEDIC SURGERY

## 2019-05-07 PROCEDURE — 80048 BASIC METABOLIC PNL TOTAL CA: CPT | Performed by: ORTHOPAEDIC SURGERY

## 2019-05-07 PROCEDURE — 87086 URINE CULTURE/COLONY COUNT: CPT | Performed by: ORTHOPAEDIC SURGERY

## 2019-05-07 RX ORDER — GABAPENTIN 100 MG/1
100 CAPSULE ORAL NIGHTLY
COMMUNITY

## 2019-05-07 RX ORDER — ASPIRIN 81 MG/1
81 TABLET, CHEWABLE ORAL DAILY
COMMUNITY
End: 2020-05-13 | Stop reason: HOSPADM

## 2019-05-07 RX ORDER — PANTOPRAZOLE SODIUM 20 MG/1
20 TABLET, DELAYED RELEASE ORAL DAILY
COMMUNITY

## 2019-05-07 ASSESSMENT — KOOS JR
KOOS JR SCORE: 16
KOOS JR SCORE: 47.487

## 2019-05-07 NOTE — DISCHARGE INSTRUCTIONS
Take the following medications the morning of surgery with a small sip of water:    BISOPROLOL/HCTZ  CARBIDOPA  PANTOPRAZOLE      General Instructions:  • Do not eat solid food after midnight the night before surgery.  • You may drink clear liquids day of surgery but must stop at least one hour before your hospital arrival time @ 0700 AM STOP DRINKING!!  • It is beneficial for you to have a clear drink that contains carbohydrates the day of surgery.  We suggest a 12 to 20 ounce bottle of Gatorade or Powerade for non-diabetic patients or a 12 to 20 ounce bottle of G2 or Powerade Zero for diabetic patients.     Clear liquids are liquids you can see through.  Nothing red in color.     Plain water                               Sports drinks  Sodas                                   Gelatin (Jell-O)  Fruit juices without pulp such as white grape juice and apple juice  Popsicles that contain no fruit or yogurt  Tea or coffee (no cream or milk added)  Gatorade / Powerade  G2 / Powerade Zero    • Patients who avoid smoking, chewing tobacco and alcohol for 4 weeks prior to surgery have a reduced risk of post-operative complications.  Quit smoking as many days before surgery as you can.  • Do not smoke, use chewing tobacco or drink alcohol the day of surgery.   • If applicable bring your C-PAP/ BI-PAP machine.  • Bring any papers given to you in the doctor’s office.  • Wear clean comfortable clothes and socks.  • Do not wear contact lenses, false eyelashes or make-up.  Bring a case for your glasses.   • Bring crutches or walker if applicable.  • Remove all piercings.  Leave jewelry and any other valuables at home.  • Hair extensions with metal clips must be removed prior to surgery.  • The Pre-Admission Testing nurse will instruct you to bring medications if unable to obtain an accurate list in Pre-Admission Testing.      Preventing a Surgical Site Infection:  • For 2 to 3 days before surgery, avoid shaving with a razor  because the razor can irritate skin and make it easier to develop an infection.    • Any areas of open skin can increase the risk of a post-operative wound infection by allowing bacteria to enter and travel throughout the body.  Notify your surgeon if you have any skin wounds / rashes even if it is not near the expected surgical site.  The area will need assessed to determine if surgery should be delayed until it is healed.  • The night prior to surgery sleep in a clean bed with clean clothing.  Do not allow pets to sleep with you.  • Shower on the morning of surgery using a fresh bar of anti-bacterial soap (such as Dial) and clean washcloth.  Dry with a clean towel and dress in clean clothing.  • Ask your surgeon if you will be receiving antibiotics prior to surgery.  • Make sure you, your family, and all healthcare providers clean their hands with soap and water or an alcohol based hand  before caring for you or your wound.    Day of surgery: 05- ARRIVE @ 0800 AM REPORT TO THE MAIN OR   Upon arrival, a Pre-op nurse and Anesthesiologist will review your health history, obtain vital signs, and answer questions you may have.  The only belongings needed at this time will be a list of your home medications and if applicable your C-PAP/BI-PAP machine.  If you are staying overnight your family can leave the rest of your belongings in the car and bring them to your room later.  A Pre-op nurse will start an IV and you may receive medication in preparation for surgery, including something to help you relax.  Your family will be able to see you in the Pre-op area.  While you are in surgery your family should notify the waiting room  if they leave the waiting room area and provide a contact phone number.    Please be aware that surgery does come with discomfort.  We want to make every effort to control your discomfort so please discuss any uncontrolled symptoms with your nurse.   Your doctor will  most likely have prescribed pain medications.      If you are going home after surgery you will receive individualized written care instructions before being discharged.  A responsible adult must drive you to and from the hospital on the day of your surgery and stay with you for 24 hours.    If you are staying overnight following surgery, you will be transported to your hospital room following the recovery period.  [facility] has all private rooms.    You have received a list of surgical assistants for your reference.  If you have any questions please call Pre-Admission Testing at [phone].  Deductibles and co-payments are collected on the day of service. Please be prepared to pay the required co-pay, deductible or deposit on the day of service as defined by your plan.      2% CHLORAHEXIDINE GLUCONATE* CLOTH  Preparing or “prepping” skin before surgery can reduce the risk of infection at the surgical site. To make the process easier, [facility] has chosen disposable cloths moistened with a rinse-free, 2% Chlorhexidine Gluconate (CHG) antiseptic solution. The steps below outline the prepping process and should be carefully followed.        Use the prep cloth on the area that is circled in the diagram             Directions Night before Surgery 05- PM PRIOR TO SURGERY (LEFT LEG)  1) Shower using a fresh bar of anti-bacterial soap (such as Dial) and clean washcloth.  Use a clean towel to completely dry your skin.  2) Do not use any lotions, oils or creams on your skin.  3) Open the package and remove 1 cloth, wipe your skin for 30 seconds in a circular motion.  Allow to dry for 3 minutes.  4) Repeat #3 with second cloth.  5) Do not touch your eyes, ears, or mouth with the prep cloth.  6) Allow the wet prep solution to air dry.  7) Discard the prep cloth and wash your hands with soap and water.   8) Dress in clean bed clothes and sleep on fresh clean bed sheets.   9) You may  experience some temporary itching after the prep.    Directions Day of Surgery 05- AM PRIOR TO SURGERY (LEFT LEG)  1) Repeat steps 1,2,3,4,5,6,7, and 9.   2) Dress in clean clothes before coming to the hospital.    BACTROBAN NASAL OINTMENT  There are many germs normally in your nose. Bactroban is an ointment that will help reduce these germs. Please follow these instructions for Bactroban use:      ____The day before surgery in the morning  Dfnc___[id number]_____    ____The day before surgery in the evening              Emtr___[id number]_____     ____The day of surgery in the morning    Ursj___[id number]_____    **Squirt ½ package of Bactroban Ointment onto a cotton applicator and apply to inside of 1st nostril.  Squirt the remaining Bactroban and apply to the inside of the other nostril.

## 2019-05-08 LAB — BACTERIA SPEC AEROBE CULT: NO GROWTH

## 2019-05-15 ENCOUNTER — ANESTHESIA EVENT (OUTPATIENT)
Dept: PERIOP | Facility: HOSPITAL | Age: 84
End: 2019-05-15

## 2019-05-15 ENCOUNTER — APPOINTMENT (OUTPATIENT)
Dept: GENERAL RADIOLOGY | Facility: HOSPITAL | Age: 84
End: 2019-05-15

## 2019-05-15 ENCOUNTER — ANESTHESIA (OUTPATIENT)
Dept: PERIOP | Facility: HOSPITAL | Age: 84
End: 2019-05-15

## 2019-05-15 ENCOUNTER — HOSPITAL ENCOUNTER (INPATIENT)
Facility: HOSPITAL | Age: 84
LOS: 3 days | Discharge: NURSING, REHAB OR LONG-TERM CARE | End: 2019-05-18
Attending: ORTHOPAEDIC SURGERY | Admitting: ORTHOPAEDIC SURGERY

## 2019-05-15 DIAGNOSIS — M17.11 PRIMARY OSTEOARTHRITIS OF RIGHT KNEE: Primary | ICD-10-CM

## 2019-05-15 LAB
HCT VFR BLD AUTO: 36.3 % (ref 34–46.6)
HGB BLD-MCNC: 12 G/DL (ref 12–15.9)

## 2019-05-15 PROCEDURE — 97161 PT EVAL LOW COMPLEX 20 MIN: CPT

## 2019-05-15 PROCEDURE — C9290 INJ, BUPIVACAINE LIPOSOME: HCPCS | Performed by: ORTHOPAEDIC SURGERY

## 2019-05-15 PROCEDURE — 25010000002 FENTANYL CITRATE (PF) 100 MCG/2ML SOLUTION: Performed by: NURSE ANESTHETIST, CERTIFIED REGISTERED

## 2019-05-15 PROCEDURE — 97110 THERAPEUTIC EXERCISES: CPT

## 2019-05-15 PROCEDURE — C1776 JOINT DEVICE (IMPLANTABLE): HCPCS | Performed by: ORTHOPAEDIC SURGERY

## 2019-05-15 PROCEDURE — 73560 X-RAY EXAM OF KNEE 1 OR 2: CPT

## 2019-05-15 PROCEDURE — 25010000002 PROPOFOL 10 MG/ML EMULSION: Performed by: NURSE ANESTHETIST, CERTIFIED REGISTERED

## 2019-05-15 PROCEDURE — 85014 HEMATOCRIT: CPT | Performed by: ORTHOPAEDIC SURGERY

## 2019-05-15 PROCEDURE — 25010000002 MIDAZOLAM PER 1 MG: Performed by: NURSE ANESTHETIST, CERTIFIED REGISTERED

## 2019-05-15 PROCEDURE — 25010000003 CEFAZOLIN IN DEXTROSE 2-4 GM/100ML-% SOLUTION: Performed by: ORTHOPAEDIC SURGERY

## 2019-05-15 PROCEDURE — 25010000003 BUPIVACAINE LIPOSOME 1.3 % SUSPENSION 20 ML VIAL: Performed by: ORTHOPAEDIC SURGERY

## 2019-05-15 PROCEDURE — C1713 ANCHOR/SCREW BN/BN,TIS/BN: HCPCS | Performed by: ORTHOPAEDIC SURGERY

## 2019-05-15 PROCEDURE — 0SRC0J9 REPLACEMENT OF RIGHT KNEE JOINT WITH SYNTHETIC SUBSTITUTE, CEMENTED, OPEN APPROACH: ICD-10-PCS | Performed by: ORTHOPAEDIC SURGERY

## 2019-05-15 PROCEDURE — 25010000002 HYDRALAZINE PER 20 MG: Performed by: NURSE ANESTHETIST, CERTIFIED REGISTERED

## 2019-05-15 PROCEDURE — 85018 HEMOGLOBIN: CPT | Performed by: ORTHOPAEDIC SURGERY

## 2019-05-15 DEVICE — CAP TOTL KN CMT PREMIUM: Type: IMPLANTABLE DEVICE | Site: KNEE | Status: FUNCTIONAL

## 2019-05-15 DEVICE — PAT KN PERSONA VE CRS/LNK CMT 8X29MM: Type: IMPLANTABLE DEVICE | Site: KNEE | Status: FUNCTIONAL

## 2019-05-15 DEVICE — CAP BEAR KN VE UPCHRG: Type: IMPLANTABLE DEVICE | Site: KNEE | Status: FUNCTIONAL

## 2019-05-15 DEVICE — STEM TIB/KN PERSONA CMT 5D SZE RT: Type: IMPLANTABLE DEVICE | Site: KNEE | Status: FUNCTIONAL

## 2019-05-15 DEVICE — PALACOS® R IS A FAST-CURING, RADIOPAQUE, POLY(METHYL METHACRYLATE)-BASED BONE CEMENT.PALACOS ® R CONTAINS THE X-RAY CONTRAST MEDIUM ZIRCONIUM DIOXIDE. TO IMPROVE VISIBILITY IN THE SURGICAL FIELD PALACOS ® R HAS BEEN COLOURED WITH CHLOROPHYLL (E141). THE BONE CEMENT IS PREPARED DIRECTLY BEFORE USE BY MIXING A POLYMER POWDER COMPONENT WITH A LIQUID MONOMER COMPONENT. A DUCTILE DOUGH FORMS WHICH CURES WITHIN A FEW MINUTES.
Type: IMPLANTABLE DEVICE | Site: KNEE | Status: FUNCTIONAL
Brand: PALACOS®

## 2019-05-15 DEVICE — ART/SRF KN PERSONA PS EF MC 6TO7 10MM RT: Type: IMPLANTABLE DEVICE | Site: KNEE | Status: FUNCTIONAL

## 2019-05-15 DEVICE — CAP PAT KN VE/TM UPCHRG: Type: IMPLANTABLE DEVICE | Site: KNEE | Status: FUNCTIONAL

## 2019-05-15 DEVICE — COMP FEM/KN PERSONA CR CMT NRW SZ7 RT: Type: IMPLANTABLE DEVICE | Site: KNEE | Status: FUNCTIONAL

## 2019-05-15 RX ORDER — HYDRALAZINE HYDROCHLORIDE 20 MG/ML
5 INJECTION INTRAMUSCULAR; INTRAVENOUS
Status: DISCONTINUED | OUTPATIENT
Start: 2019-05-15 | End: 2019-05-15 | Stop reason: HOSPADM

## 2019-05-15 RX ORDER — GLYCOPYRROLATE 0.2 MG/ML
INJECTION INTRAMUSCULAR; INTRAVENOUS AS NEEDED
Status: DISCONTINUED | OUTPATIENT
Start: 2019-05-15 | End: 2019-05-15 | Stop reason: SURG

## 2019-05-15 RX ORDER — ONDANSETRON 4 MG/1
4 TABLET, FILM COATED ORAL EVERY 6 HOURS PRN
Status: DISCONTINUED | OUTPATIENT
Start: 2019-05-15 | End: 2019-05-18 | Stop reason: HOSPADM

## 2019-05-15 RX ORDER — EPHEDRINE SULFATE 50 MG/ML
5 INJECTION, SOLUTION INTRAVENOUS ONCE AS NEEDED
Status: DISCONTINUED | OUTPATIENT
Start: 2019-05-15 | End: 2019-05-15 | Stop reason: HOSPADM

## 2019-05-15 RX ORDER — MIDAZOLAM HYDROCHLORIDE 1 MG/ML
2 INJECTION INTRAMUSCULAR; INTRAVENOUS
Status: DISCONTINUED | OUTPATIENT
Start: 2019-05-15 | End: 2019-05-15 | Stop reason: HOSPADM

## 2019-05-15 RX ORDER — UREA 10 %
1 LOTION (ML) TOPICAL NIGHTLY PRN
Status: DISCONTINUED | OUTPATIENT
Start: 2019-05-15 | End: 2019-05-18 | Stop reason: HOSPADM

## 2019-05-15 RX ORDER — SODIUM CHLORIDE, SODIUM LACTATE, POTASSIUM CHLORIDE, CALCIUM CHLORIDE 600; 310; 30; 20 MG/100ML; MG/100ML; MG/100ML; MG/100ML
9 INJECTION, SOLUTION INTRAVENOUS CONTINUOUS
Status: DISCONTINUED | OUTPATIENT
Start: 2019-05-15 | End: 2019-05-16

## 2019-05-15 RX ORDER — BISACODYL 10 MG
10 SUPPOSITORY, RECTAL RECTAL DAILY PRN
Status: DISCONTINUED | OUTPATIENT
Start: 2019-05-15 | End: 2019-05-18 | Stop reason: HOSPADM

## 2019-05-15 RX ORDER — POTASSIUM CHLORIDE 750 MG/1
40 CAPSULE, EXTENDED RELEASE ORAL AS NEEDED
Status: DISCONTINUED | OUTPATIENT
Start: 2019-05-15 | End: 2019-05-18 | Stop reason: HOSPADM

## 2019-05-15 RX ORDER — GABAPENTIN 100 MG/1
100 CAPSULE ORAL NIGHTLY
Status: DISCONTINUED | OUTPATIENT
Start: 2019-05-15 | End: 2019-05-18 | Stop reason: HOSPADM

## 2019-05-15 RX ORDER — FENTANYL CITRATE 50 UG/ML
50 INJECTION, SOLUTION INTRAMUSCULAR; INTRAVENOUS
Status: DISCONTINUED | OUTPATIENT
Start: 2019-05-15 | End: 2019-05-15 | Stop reason: HOSPADM

## 2019-05-15 RX ORDER — LIDOCAINE HYDROCHLORIDE 10 MG/ML
0.5 INJECTION, SOLUTION EPIDURAL; INFILTRATION; INTRACAUDAL; PERINEURAL ONCE AS NEEDED
Status: DISCONTINUED | OUTPATIENT
Start: 2019-05-15 | End: 2019-05-15 | Stop reason: HOSPADM

## 2019-05-15 RX ORDER — POTASSIUM CHLORIDE 1.5 G/1.77G
40 POWDER, FOR SOLUTION ORAL AS NEEDED
Status: DISCONTINUED | OUTPATIENT
Start: 2019-05-15 | End: 2019-05-18 | Stop reason: HOSPADM

## 2019-05-15 RX ORDER — PANTOPRAZOLE SODIUM 20 MG/1
20 TABLET, DELAYED RELEASE ORAL DAILY
Status: DISCONTINUED | OUTPATIENT
Start: 2019-05-16 | End: 2019-05-15 | Stop reason: CLARIF

## 2019-05-15 RX ORDER — ONDANSETRON 2 MG/ML
4 INJECTION INTRAMUSCULAR; INTRAVENOUS ONCE AS NEEDED
Status: DISCONTINUED | OUTPATIENT
Start: 2019-05-15 | End: 2019-05-15 | Stop reason: HOSPADM

## 2019-05-15 RX ORDER — ACETAMINOPHEN 325 MG/1
650 TABLET ORAL ONCE AS NEEDED
Status: DISCONTINUED | OUTPATIENT
Start: 2019-05-15 | End: 2019-05-15 | Stop reason: HOSPADM

## 2019-05-15 RX ORDER — HYDROMORPHONE HYDROCHLORIDE 1 MG/ML
0.25 INJECTION, SOLUTION INTRAMUSCULAR; INTRAVENOUS; SUBCUTANEOUS
Status: DISCONTINUED | OUTPATIENT
Start: 2019-05-15 | End: 2019-05-16

## 2019-05-15 RX ORDER — PANTOPRAZOLE SODIUM 40 MG/1
40 TABLET, DELAYED RELEASE ORAL
Status: DISCONTINUED | OUTPATIENT
Start: 2019-05-16 | End: 2019-05-18 | Stop reason: HOSPADM

## 2019-05-15 RX ORDER — ASPIRIN 325 MG
325 TABLET, DELAYED RELEASE (ENTERIC COATED) ORAL EVERY 12 HOURS SCHEDULED
Status: DISCONTINUED | OUTPATIENT
Start: 2019-05-15 | End: 2019-05-18 | Stop reason: HOSPADM

## 2019-05-15 RX ORDER — NALOXONE HCL 0.4 MG/ML
0.1 VIAL (ML) INJECTION
Status: DISCONTINUED | OUTPATIENT
Start: 2019-05-15 | End: 2019-05-18 | Stop reason: HOSPADM

## 2019-05-15 RX ORDER — CEFAZOLIN SODIUM 2 G/100ML
2 INJECTION, SOLUTION INTRAVENOUS EVERY 8 HOURS
Status: COMPLETED | OUTPATIENT
Start: 2019-05-15 | End: 2019-05-16

## 2019-05-15 RX ORDER — TRANEXAMIC ACID 100 MG/ML
INJECTION, SOLUTION INTRAVENOUS AS NEEDED
Status: DISCONTINUED | OUTPATIENT
Start: 2019-05-15 | End: 2019-05-15 | Stop reason: SURG

## 2019-05-15 RX ORDER — MIDAZOLAM HYDROCHLORIDE 1 MG/ML
INJECTION INTRAMUSCULAR; INTRAVENOUS AS NEEDED
Status: DISCONTINUED | OUTPATIENT
Start: 2019-05-15 | End: 2019-05-15 | Stop reason: SURG

## 2019-05-15 RX ORDER — SODIUM CHLORIDE 0.9 % (FLUSH) 0.9 %
3 SYRINGE (ML) INJECTION EVERY 12 HOURS SCHEDULED
Status: DISCONTINUED | OUTPATIENT
Start: 2019-05-15 | End: 2019-05-18 | Stop reason: HOSPADM

## 2019-05-15 RX ORDER — DIPHENHYDRAMINE HCL 25 MG
25 CAPSULE ORAL
Status: DISCONTINUED | OUTPATIENT
Start: 2019-05-15 | End: 2019-05-15 | Stop reason: HOSPADM

## 2019-05-15 RX ORDER — LABETALOL HYDROCHLORIDE 5 MG/ML
5 INJECTION, SOLUTION INTRAVENOUS
Status: DISCONTINUED | OUTPATIENT
Start: 2019-05-15 | End: 2019-05-15 | Stop reason: HOSPADM

## 2019-05-15 RX ORDER — PROPOFOL 10 MG/ML
VIAL (ML) INTRAVENOUS CONTINUOUS PRN
Status: DISCONTINUED | OUTPATIENT
Start: 2019-05-15 | End: 2019-05-15 | Stop reason: SURG

## 2019-05-15 RX ORDER — FAMOTIDINE 10 MG/ML
20 INJECTION, SOLUTION INTRAVENOUS ONCE
Status: COMPLETED | OUTPATIENT
Start: 2019-05-15 | End: 2019-05-15

## 2019-05-15 RX ORDER — SODIUM CHLORIDE 0.9 % (FLUSH) 0.9 %
1-10 SYRINGE (ML) INJECTION AS NEEDED
Status: DISCONTINUED | OUTPATIENT
Start: 2019-05-15 | End: 2019-05-15 | Stop reason: HOSPADM

## 2019-05-15 RX ORDER — NALOXONE HCL 0.4 MG/ML
0.2 VIAL (ML) INJECTION AS NEEDED
Status: DISCONTINUED | OUTPATIENT
Start: 2019-05-15 | End: 2019-05-15 | Stop reason: HOSPADM

## 2019-05-15 RX ORDER — HYDROCODONE BITARTRATE AND ACETAMINOPHEN 10; 325 MG/1; MG/1
1 TABLET ORAL EVERY 4 HOURS PRN
Status: DISCONTINUED | OUTPATIENT
Start: 2019-05-15 | End: 2019-05-16

## 2019-05-15 RX ORDER — MIDAZOLAM HYDROCHLORIDE 1 MG/ML
1 INJECTION INTRAMUSCULAR; INTRAVENOUS
Status: DISCONTINUED | OUTPATIENT
Start: 2019-05-15 | End: 2019-05-15 | Stop reason: HOSPADM

## 2019-05-15 RX ORDER — SENNA AND DOCUSATE SODIUM 50; 8.6 MG/1; MG/1
2 TABLET, FILM COATED ORAL 2 TIMES DAILY
Status: DISCONTINUED | OUTPATIENT
Start: 2019-05-15 | End: 2019-05-18 | Stop reason: HOSPADM

## 2019-05-15 RX ORDER — ATENOLOL AND CHLORTHALIDONE 50; 25 MG/1; MG/1
TABLET ORAL
Status: DISCONTINUED | OUTPATIENT
Start: 2019-05-16 | End: 2019-05-18 | Stop reason: HOSPADM

## 2019-05-15 RX ORDER — HYDROMORPHONE HYDROCHLORIDE 1 MG/ML
0.5 INJECTION, SOLUTION INTRAMUSCULAR; INTRAVENOUS; SUBCUTANEOUS
Status: DISCONTINUED | OUTPATIENT
Start: 2019-05-15 | End: 2019-05-15 | Stop reason: HOSPADM

## 2019-05-15 RX ORDER — OXYCODONE HYDROCHLORIDE AND ACETAMINOPHEN 5; 325 MG/1; MG/1
1 TABLET ORAL EVERY 4 HOURS PRN
Status: DISCONTINUED | OUTPATIENT
Start: 2019-05-15 | End: 2019-05-16

## 2019-05-15 RX ORDER — FLUMAZENIL 0.1 MG/ML
0.2 INJECTION INTRAVENOUS AS NEEDED
Status: DISCONTINUED | OUTPATIENT
Start: 2019-05-15 | End: 2019-05-15 | Stop reason: HOSPADM

## 2019-05-15 RX ORDER — SODIUM CHLORIDE, SODIUM LACTATE, POTASSIUM CHLORIDE, CALCIUM CHLORIDE 600; 310; 30; 20 MG/100ML; MG/100ML; MG/100ML; MG/100ML
75 INJECTION, SOLUTION INTRAVENOUS CONTINUOUS
Status: DISCONTINUED | OUTPATIENT
Start: 2019-05-15 | End: 2019-05-16

## 2019-05-15 RX ORDER — MELOXICAM 15 MG/1
15 TABLET ORAL DAILY
Status: DISCONTINUED | OUTPATIENT
Start: 2019-05-15 | End: 2019-05-18 | Stop reason: HOSPADM

## 2019-05-15 RX ORDER — DIPHENHYDRAMINE HCL 12.5MG/5ML
12.5 LIQUID (ML) ORAL EVERY 6 HOURS PRN
Status: DISCONTINUED | OUTPATIENT
Start: 2019-05-15 | End: 2019-05-15 | Stop reason: HOSPADM

## 2019-05-15 RX ORDER — MAGNESIUM HYDROXIDE 1200 MG/15ML
LIQUID ORAL AS NEEDED
Status: DISCONTINUED | OUTPATIENT
Start: 2019-05-15 | End: 2019-05-15 | Stop reason: HOSPADM

## 2019-05-15 RX ORDER — ONDANSETRON 2 MG/ML
4 INJECTION INTRAMUSCULAR; INTRAVENOUS EVERY 6 HOURS PRN
Status: DISCONTINUED | OUTPATIENT
Start: 2019-05-15 | End: 2019-05-18 | Stop reason: HOSPADM

## 2019-05-15 RX ORDER — BUPIVACAINE HYDROCHLORIDE 7.5 MG/ML
INJECTION, SOLUTION EPIDURAL; RETROBULBAR
Status: COMPLETED | OUTPATIENT
Start: 2019-05-15 | End: 2019-05-15

## 2019-05-15 RX ORDER — CEFAZOLIN SODIUM 2 G/100ML
2 INJECTION, SOLUTION INTRAVENOUS ONCE
Status: COMPLETED | OUTPATIENT
Start: 2019-05-15 | End: 2019-05-15

## 2019-05-15 RX ORDER — FENTANYL CITRATE 50 UG/ML
INJECTION, SOLUTION INTRAMUSCULAR; INTRAVENOUS AS NEEDED
Status: DISCONTINUED | OUTPATIENT
Start: 2019-05-15 | End: 2019-05-15 | Stop reason: SURG

## 2019-05-15 RX ORDER — SODIUM CHLORIDE 0.9 % (FLUSH) 0.9 %
1-10 SYRINGE (ML) INJECTION AS NEEDED
Status: DISCONTINUED | OUTPATIENT
Start: 2019-05-15 | End: 2019-05-18 | Stop reason: HOSPADM

## 2019-05-15 RX ORDER — ACETAMINOPHEN 325 MG/1
325 TABLET ORAL EVERY 4 HOURS PRN
Status: DISCONTINUED | OUTPATIENT
Start: 2019-05-15 | End: 2019-05-18 | Stop reason: HOSPADM

## 2019-05-15 RX ADMIN — MELOXICAM 15 MG: 15 TABLET ORAL at 14:18

## 2019-05-15 RX ADMIN — HYDROCODONE BITARTRATE AND ACETAMINOPHEN 1 TABLET: 10; 325 TABLET ORAL at 17:07

## 2019-05-15 RX ADMIN — CEFAZOLIN SODIUM 2 G: 2 INJECTION, SOLUTION INTRAVENOUS at 17:05

## 2019-05-15 RX ADMIN — CARBIDOPA AND LEVODOPA 1.5 TABLET: 25; 100 TABLET ORAL at 17:04

## 2019-05-15 RX ADMIN — CEFAZOLIN SODIUM 2 G: 2 INJECTION, SOLUTION INTRAVENOUS at 09:02

## 2019-05-15 RX ADMIN — SENNOSIDES,DOCUSATE SODIUM 2 TABLET: 50; 8.6 TABLET, FILM COATED ORAL at 14:18

## 2019-05-15 RX ADMIN — FENTANYL CITRATE 50 MCG: 50 INJECTION, SOLUTION INTRAMUSCULAR; INTRAVENOUS at 10:46

## 2019-05-15 RX ADMIN — FENTANYL CITRATE 25 MCG: 50 INJECTION INTRAMUSCULAR; INTRAVENOUS at 09:00

## 2019-05-15 RX ADMIN — Medication 1 MG: at 09:02

## 2019-05-15 RX ADMIN — TRANEXAMIC ACID 1000 MG: 100 INJECTION, SOLUTION INTRAVENOUS at 09:12

## 2019-05-15 RX ADMIN — GLYCOPYRROLATE 0.3 MG: 0.2 INJECTION INTRAMUSCULAR; INTRAVENOUS at 09:18

## 2019-05-15 RX ADMIN — ASPIRIN 325 MG: 325 TABLET, COATED ORAL at 17:05

## 2019-05-15 RX ADMIN — FENTANYL CITRATE 50 MCG: 50 INJECTION, SOLUTION INTRAMUSCULAR; INTRAVENOUS at 10:53

## 2019-05-15 RX ADMIN — HYDROCODONE BITARTRATE AND ACETAMINOPHEN 1 TABLET: 10; 325 TABLET ORAL at 12:31

## 2019-05-15 RX ADMIN — SENNOSIDES,DOCUSATE SODIUM 2 TABLET: 50; 8.6 TABLET, FILM COATED ORAL at 21:19

## 2019-05-15 RX ADMIN — SODIUM CHLORIDE, POTASSIUM CHLORIDE, SODIUM LACTATE AND CALCIUM CHLORIDE: 600; 310; 30; 20 INJECTION, SOLUTION INTRAVENOUS at 10:13

## 2019-05-15 RX ADMIN — HYDRALAZINE HYDROCHLORIDE 5 MG: 20 INJECTION INTRAMUSCULAR; INTRAVENOUS at 10:41

## 2019-05-15 RX ADMIN — PROPOFOL 100 MCG/KG/MIN: 10 INJECTION, EMULSION INTRAVENOUS at 09:00

## 2019-05-15 RX ADMIN — SODIUM CHLORIDE, POTASSIUM CHLORIDE, SODIUM LACTATE AND CALCIUM CHLORIDE 9 ML/HR: 600; 310; 30; 20 INJECTION, SOLUTION INTRAVENOUS at 08:35

## 2019-05-15 RX ADMIN — SODIUM CHLORIDE, PRESERVATIVE FREE 3 ML: 5 INJECTION INTRAVENOUS at 21:18

## 2019-05-15 RX ADMIN — SODIUM CHLORIDE, POTASSIUM CHLORIDE, SODIUM LACTATE AND CALCIUM CHLORIDE 75 ML/HR: 600; 310; 30; 20 INJECTION, SOLUTION INTRAVENOUS at 12:38

## 2019-05-15 RX ADMIN — FAMOTIDINE 20 MG: 10 INJECTION INTRAVENOUS at 08:34

## 2019-05-15 RX ADMIN — GABAPENTIN 100 MG: 100 CAPSULE ORAL at 21:19

## 2019-05-15 RX ADMIN — BUPIVACAINE HYDROCHLORIDE 1.8 ML: 7.5 INJECTION, SOLUTION EPIDURAL; RETROBULBAR at 08:55

## 2019-05-15 RX ADMIN — CARBIDOPA AND LEVODOPA 1.5 TABLET: 25; 100 TABLET ORAL at 21:18

## 2019-05-15 NOTE — ANESTHESIA POSTPROCEDURE EVALUATION
"Patient: Gregoria Jimenez    Procedure Summary     Date:  05/15/19 Room / Location:  [facility] OR [address] [place] [address] MAIN OR    Anesthesia Start:  0840 Anesthesia Stop:  1027    Procedure:  RIGHT TOTAL KNEE ARTHROPLASTY (Right Knee) Diagnosis:      Surgeon:  Gregory Carvajal MD Provider:  Navneet Laguna MD    Anesthesia Type:  spinal ASA Status:  3          Anesthesia Type: spinal  Last vitals  BP   148/60 (05/15/19 1100)   Temp   36.4 °C (97.6 °F) (05/15/19 1023)   Pulse   65 (05/15/19 1100)   Resp   16 (05/15/19 1100)     SpO2   100 % (05/15/19 1100)     Post Anesthesia Care and Evaluation    Patient location during evaluation: PACU  Patient participation: complete - patient participated  Level of consciousness: awake and alert  Pain score: 0  Pain management: adequate  Airway patency: patent  Anesthetic complications: No anesthetic complications    Cardiovascular status: acceptable  Respiratory status: acceptable  Hydration status: acceptable    Comments: /60   Pulse 65   Temp 36.4 °C (97.6 °F) (Oral)   Resp 16   Ht 154.9 cm (60.98\")   Wt 64.4 kg (142 lb)   SpO2 100%   BMI 26.84 kg/m²       "

## 2019-05-15 NOTE — PLAN OF CARE
Problem: Patient Care Overview  Goal: Plan of Care Review  Outcome: Ongoing (interventions implemented as appropriate)   05/15/19 3278   Coping/Psychosocial   Plan of Care Reviewed With patient   OTHER   Outcome Summary S/P RTKA. VSS. Pain controlled with PO pain med. Dressing c/d/i. Up to chair and worked with PT today. Voiding per BRP. Ambulates with assist and walker. Discussed bp med and monitoring r/tHTN. Plans to d/c to rehab.    Plan of Care Review   Progress improving       Problem: Fall Risk (Adult)  Goal: Identify Related Risk Factors and Signs and Symptoms  Outcome: Outcome(s) achieved Date Met: 05/15/19    Goal: Absence of Fall  Outcome: Ongoing (interventions implemented as appropriate)      Problem: Knee Arthroplasty (Total, Partial) (Adult)  Goal: Signs and Symptoms of Listed Potential Problems Will be Absent, Minimized or Managed (Knee Arthroplasty)  Outcome: Ongoing (interventions implemented as appropriate)    Goal: Anesthesia/Sedation Recovery  Outcome: Ongoing (interventions implemented as appropriate)

## 2019-05-15 NOTE — H&P
ORTHOPEDIC SURGERY PRE-OP HISTORY AND PHYSICAL      Patient: Gregoria Jimenez  Date of Admission: 5/15/2019  7:38 AM  YOB: 1934  Medical Record Number: [id number]  Attending Physician: Gregory Carvajal MD  Consulting Physician: Gregory Carvajal MD    CHIEF COMPLIANT: Right Knee Pain.    HISTORY OF PRESENT ILLINESS: Patient is a 85 y.o. year old female presents to [facility] with above complaints.  The patient failed conservative treatment and patient requested surgical intervention.  The patient presents for a  Right total knee.    ALLERGIES:   Allergies   Allergen Reactions   • Sulfa Antibiotics Nausea Only       HOME MEDICATIONS:  Medications Prior to Admission   Medication Sig Dispense Refill Last Dose   • acetaminophen (TYLENOL) 325 MG tablet Take 1-2 tablets by mouth Every 4 (Four) Hours As Needed for moderate pain (4-6). 1 bottle 1 4/24/2019   • aspirin 81 MG chewable tablet Chew 81 mg Daily. HOLD PRIOR TO SURGERY   5/10/2019   • bisoprolol-hydrochlorothiazide (ZIAC) 5-6.25 MG per tablet Take 1 tablet by mouth Every Morning.   5/15/2019 at 0630   • carbidopa-levodopa (SINEMET)  MG per tablet Take 1.5 tablets by mouth 3 (Three) Times a Day.   5/14/2019 at 1800   • carbidopa-levodopa (SINEMET)  MG per tablet Take 2 tablets by mouth Every Morning.   5/15/2019 at 0630   • CHLORHEXIDINE GLUCONATE CLOTH EX Apply 1 application topically. AS DIRECTED:  PM DAY BEFORE SURGERY  AM DAY OF SURGERY    5/15/2019 at 0600   • Cholecalciferol (VITAMIN D3) 40107 units tablet Take 1 tablet by mouth Every 14 (Fourteen) Days.   5/8/2019   • gabapentin (NEURONTIN) 100 MG capsule Take 100 mg by mouth Every Night.   5/14/2019 at 1800   • meloxicam (MOBIC) 15 MG tablet Take 15 mg by mouth Daily. HOLD PRIOR TO SURGERY   5/8/2019   • mupirocin (BACTROBAN) 2 % nasal ointment into the nostril(s) as directed by provider. AS DIRECTED:  AM & PM DAY BEFORE SURGERY  AM DAY OF SURGERY   5/15/2019 at 0600    • pantoprazole (PROTONIX) 20 MG EC tablet Take 20 mg by mouth Daily.   5/15/2019 at 0630   • sennosides-docusate sodium (SENOKOT-S) 8.6-50 MG tablet Take 2 tablets by mouth 2 (Two) Times a Day. 50 tablet 1 5/13/2019       Past Medical History:   Diagnosis Date   • Arthritis    • Constipation    • Eye hemorrhage, right     BLOOD SHOT RIGHT EYE-05- EYE EXAM-DR. ROSALES INFORMED NO SIG OF INFECTION-PT MOST LIKELY HAPPENED FRO A SNEEZE OR COUGH-BLOOD VESSELS BROKEN   • GERD (gastroesophageal reflux disease)    • History of bronchiectasis     2015   • Hypertension    • Limited joint range of motion (ROM)     RIGHT KNEE   • Parkinson disease (CMS/HCC)    • Vitamin D deficiency      Past Surgical History:   Procedure Laterality Date   • CATARACT EXTRACTION W/ INTRAOCULAR LENS  IMPLANT, BILATERAL     • COLONOSCOPY W/ BIOPSIES AND POLYPECTOMY      BENIGN   • KNEE ARTHROSCOPY     • NH TOTAL KNEE ARTHROPLASTY Left 2/15/2017    Procedure: LT TOTAL KNEE ARTHROPLASTY;  Surgeon: Gregory Carvajal MD;  Location: [facility];  Service: Orthopedics     Social History     Occupational History   • Not on file   Tobacco Use   • Smoking status: Never Smoker   • Smokeless tobacco: Never Used   Substance and Sexual Activity   • Alcohol use: No   • Drug use: No   • Sexual activity: Defer      Social History     Social History Narrative   • Not on file     Family History   Problem Relation Age of Onset   • Malig Hyperthermia Neg Hx        REVIEW OF SYSTEMS:    HEENT: Patient denies any headaches, vision changes, change in hearing, or tinnitus, Patient denies epistaxis, sinus pain, hoarseness, or dysphagia   Pulmonary: Patient denies any cough, congestion, acute change in SOA or wheezing.   Cardiovascular: Patient denies any change in chest pain, dyspnea, palpitations, weakness, intolerance of exercise, varicosities, change in murmur   Gastrointestinal:  Patient denies change in appetite, melena, change in bowel  "habits.   Genital/Urinary: Patient denies dysuria, change in color of urine, change in frequency of urination, pain with urgency, change in incontinence, retention.   Musculoskeletal: Patient denies complaints of acute changes in symptoms of other joints not mentioned above.   Neurological: Patient denies changes in dizziness, tremor, ataxia, or difficulty in speaking or changes in memory.   Endocrine system: Patient denies acute changes in tremors, palpitations, polyuria, polydipsia, polyphagia, diaphoresis, exophthalmos, or goiter.   Psychological: Patient denies thoughts/plans or harming self or other; denies acute changes in depression,  insomnia, night terrors, noble, disorientation.   Skin: Patient denies any bruising, rashes, discoloration, pruritus,or wounds not mentioned in history of present illness or chief complaint above.   Hematopoietic: Patient denies current bleeding, epistaxis, hematuria, or melena.    PHYSICAL EXAM:   Vitals:  Vitals:    05/15/19 0811   BP: 167/94   BP Location: Left arm   Patient Position: Lying   Pulse: 54   Resp: 18   Temp: 97.5 °F (36.4 °C)   TempSrc: Oral   SpO2: 96%   Weight: 64.4 kg (142 lb)   Height: 154.9 cm (60.98\")       General:  85 y.o. female who appears about stated age.    Alert, cooperative, in no acute distress                       Head:    Normocephalic, without obvious abnormality, atraumatic   Eyes:            Lids and lashes normal, conjunctivae and sclerae normal, no         icterus, no pallor, corneas clear, PERRLA   Ears:    Ears appear intact with no abnormalities noted   Throat:   No oral lesions, no thrush, oral mucosa moist   Neck:   No adenopathy, supple, trachea midline, no JVD   Back:     Limited exam shows no severe kyphosis present,no visible           erythema, no excessive  tenderness to palpation.    Lungs:     Respirations regular, even and unlabored.     Heart:    Normal rate, Pulses palpable   Chest Wall:    No abnormalities observed. "   Abdomen:     Normal bowel sounds, no masses, no organomegaly, soft              non-tender, non-distended, no guarding, no rebound                      tenderness   Rectal:     Deferred   Pulses:   Pulses palpable and equal bilaterally   Skin:   No bleeding, bruising or rash   Lymph nodes:   No palpable adenopathy   Extremities:     Right Knee: Skin intact.  Painful ROM.  NVI distally.      DIAGNOSTIC TEST:  No visits with results within 2 Day(s) from this visit.   Latest known visit with results is:   Appointment on 05/07/2019   Component Date Value Ref Range Status   • Urine Culture 05/07/2019 No growth   Final   • WBC 05/07/2019 9.15  3.40 - 10.80 10*3/mm3 Final   • RBC 05/07/2019 4.49  3.77 - 5.28 10*6/mm3 Final   • Hemoglobin 05/07/2019 13.0  12.0 - 15.9 g/dL Final   • Hematocrit 05/07/2019 39.0  34.0 - 46.6 % Final   • MCV 05/07/2019 86.9  79.0 - 97.0 fL Final   • MCH 05/07/2019 29.0  26.6 - 33.0 pg Final   • MCHC 05/07/2019 33.3  31.5 - 35.7 g/dL Final   • RDW 05/07/2019 13.2  12.3 - 15.4 % Final   • RDW-SD 05/07/2019 42.3  37.0 - 54.0 fl Final   • MPV 05/07/2019 11.1  6.0 - 12.0 fL Final   • Platelets 05/07/2019 238  140 - 450 10*3/mm3 Final   • Glucose 05/07/2019 103* 65 - 99 mg/dL Final   • BUN 05/07/2019 23  8 - 23 mg/dL Final   • Creatinine 05/07/2019 1.09* 0.57 - 1.00 mg/dL Final   • Sodium 05/07/2019 138  136 - 145 mmol/L Final   • Potassium 05/07/2019 3.8  3.5 - 5.2 mmol/L Final   • Chloride 05/07/2019 99  98 - 107 mmol/L Final   • CO2 05/07/2019 26.3  22.0 - 29.0 mmol/L Final   • Calcium 05/07/2019 9.3  8.6 - 10.5 mg/dL Final   • eGFR Non African Amer 05/07/2019 48* >60 mL/min/1.73 Final   • BUN/Creatinine Ratio 05/07/2019 21.1  7.0 - 25.0 Final   • Anion Gap 05/07/2019 12.7  mmol/L Final   • Color, UA 05/07/2019 Yellow  Yellow, Straw Final   • Appearance, UA 05/07/2019 Clear  Clear Final   • pH, UA 05/07/2019 6.0  5.0 - 8.0 Final   • Specific Gravity, UA 05/07/2019 1.025  1.005 - 1.030 Final   •  Glucose, UA 05/07/2019 Negative  Negative Final   • Ketones, UA 05/07/2019 Trace* Negative Final   • Bilirubin, UA 05/07/2019 Negative  Negative Final   • Blood, UA 05/07/2019 Negative  Negative Final   • Protein, UA 05/07/2019 Negative  Negative Final   • Leuk Esterase, UA 05/07/2019 Trace* Negative Final   • Nitrite, UA 05/07/2019 Negative  Negative Final   • Urobilinogen, UA 05/07/2019 0.2 E.U./dL  0.2 - 1.0 E.U./dL Final   • RBC, UA 05/07/2019 None Seen  None Seen, 0-2 /HPF Final   • WBC, UA 05/07/2019 0-2  None Seen, 0-2 /HPF Final   • Bacteria, UA 05/07/2019 None Seen  None Seen /HPF Final   • Squamous Epithelial Cells, UA 05/07/2019 0-2  None Seen, 0-2 /HPF Final   • Hyaline Casts, UA 05/07/2019 3-6  None Seen /LPF Final   • Methodology 05/07/2019 Automated Microscopy   Final       No results found.      ASSESSMENT:  Right Knee Osteoarthritis  Patient Active Problem List   Diagnosis   • Osteoarthritis of left knee   • Osteoarthritis of right knee       PLAN:    Right TKA.    Risks and benefits of surgical intervention were discussed in detail with the patient.  Risks of infection, fracture, dislocation, extremity length discrepancy, neurovascular injury, persistent pain, medical risks, anesthetic risk, need for additional surgery, deep venous thrombosis, pulmonary embolism and death.      The above diagnosis and treatment plan was discussed with the patient and/or family.  They were educated in both non-surgical and surgical treatment options for their condition.   They were given the opportunity to ask questions and were answered to their satisfaction.  They agreed to proceed with the above treatment plan.        Gregory Carvajal MD  Date: 5/15/2019

## 2019-05-15 NOTE — ANESTHESIA PROCEDURE NOTES
Spinal Block      Patient reassessed immediately prior to procedure    Patient location during procedure: OR  Start Time: 5/15/2019 8:55 AM  Indication:procedure for pain  Performed By  Anesthesiologist: Navneet Laguna MD  CRNA: Jt Glynn CRNA  Preanesthetic Checklist  Completed: patient identified, site marked, surgical consent, pre-op evaluation, timeout performed, IV checked, risks and benefits discussed and monitors and equipment checked  Spinal Block Prep:  Patient Position:sitting  Sterile Tech:cap, gloves, mask and sterile barriers  Prep:Betadine  Patient Monitoring:blood pressure monitoring, continuous pulse oximetry and EKG  Spinal Block Procedure  Approach:midline  Guidance:landmark technique  Location:L2-L3  Needle Type:Sprotte  Needle Gauge:24 G  Placement of Spinal needle event:cerebrospinal fluid aspirated  Paresthesia: transient  Fluid Appearance:clear  Medications: bupivacaine PF (MARCAINE) 0.75 % injection, 1.8 mL  Med Administered at 5/15/2019 8:55 AM   Post Assessment  Patient Tolerance:patient tolerated the procedure well with no apparent complications  Complications no

## 2019-05-15 NOTE — OP NOTE
TOTAL KNEE ARTHROPLASTY  Procedure Note    Gregoria Jimenez  5/15/2019    Pre-op Diagnosis: Osteoarthritis Right  knee primary generalized  Post-op Diagnosis:Same  Procedure: Right  Total Knee Arthroplasty  Surgeon:  Gregory Carvajal MD  Anesthesia: General, Anesthesiologist: Navneet Laguna MD  CRNA: Jt Glynn CRNA  Staff: Circulator: Becca Huddleston RN  Scrub Person: Abundio Jernigan  Vendor Representative: [employer]  Assistant: Richy Blanton DO CFA  Estimated Blood Loss:minimal  Specimens:* No orders in the log *   Drains: none  Complications: None    Components Utilized:    Implant Name Type Inv. Item Serial No.  Lot No. LRB No. Used   CMT BONE PALACOS R HI/VISC 1X40 - [id number] Implant CMT BONE PALACOS R HI/VISC 1X40  [facility] [id number] Right 2   PAT KN PERSONA VE CRS/LNK CMT 29MM - [id number] Implant PAT KN PERSONA VE CRS/LNK CMT 29MM  [employer] [id number] Right 1   COMP FEM PERSONA CR CMT NRW SZ7 RT - [id number] Implant COMP FEM PERSONA CR CMT NRW SZ7 RT  [employer] [id number] Right 1   STEM TIB PERSONA CMT 5D CRISTINA RT - [id number] Implant STEM TIB PERSONA CMT 5D CRISTINA RT  [employer] [id number] Right 1   ART/SRF KN PERSONA PS EF MC 6TO7 10MM RT - [id number] Implant ART/SRF KN PERSONA PS EF MC 6TO7 10MM RT  [employer] [id number] Right 1       Indication for Procedure:   The patient is a 85 y.o. female presents today for a total knee arthroplasty procedure because of failure to conservatively manage the patients pain for arthritis.  The patient was educated in risks of surgery that could include possible risk of infection, deep venous thrombosis, pulmonary embolism, fracture, neurovascular injury, leg length discrepancy, dislocation, possible persistent pain, need for additional surgeries, anesthetic risks, medical risks including heart attack and stroke, and death.  The discussion occurred in the office pre-operatively, and patient had the opportunity to  ask questions, and concerns about the proposed surgery.  The patient also understood that medicine is not an exact science, and that outcomes of the surgical procedure may be less than desired. The patient wished to proceed.      Protocols for intravenous antibiotics and venous thrombosis were followed for this patient.  IV antibiotics were infused prior to surgery and will be discontinued within 24 hours of completion of the surgical procedure.  Thrombosis prophylaxis will be initiated within 24 hours of the completion of the surgical procedure.      Procedure:   After the patient was identified in the preoperative area, and the surgical site confirmed and marked, the patient was brought to the operating room on a stretcher.  They were placed supine on the operating room table and the above anesthetic was placed uneventfully.  A time-out procedure was performed.  The intravenous ancef antibiotics infusion was completed.  A non-sterile tourniquet was placed on the operative thigh, and was prepped and draped in the usual sterile fashion.  An Esmarch was to exsanguinate the limb, and the tourniquet was inflated.     A 10 blade scalpel was used to make a longitudinal incision from above the patella to medial to the tibial tubercle.  A medial montse-patellar arthrotomy was performed with another 10 blade scalpel.  The medial joint line was elevated subperiosteally with electrocautery and a cho elevator.  The patellar fat pad was removed.  The patella was everted and osteotomy cut completed with oscillating saw.  The patella guide and drill was used to finish preparing the patella.  A patella protecting guide was placed, and the patella was everted off the side of the femur laterally.     The knee was then flexed and Kotlik's line was drawn on the distal femur with electrocautery.  Then the drill was placed into the distal femur into the medullary canal.  The intramedullary distal femoral valgus cutting guide set to 5  degrees of femoral valgus was then placed into the medullary canal.  It was then pinned and the oscillating saw was used to make the osteotomy.  Then the anterior referencing distal femoral guide was then placed and the above femoral size was measured and 3 degrees of external rotation were drilled.  The 4 in 1 femoral cutting guide was placed and pinned and the oscillating saw was used to make the appropriate cuts.     Then the extramedullary cutting guide was placed aligned with the tibial eminence superiorly and the tibial shaft distally, pinned 4 mm from the medial tibial plateau.  The oscillating saw was then used to complete the osteotomy cuts.   A laminar  was then placed medially and then laterally to remove the medial and lateral meniscus and the posterior osteophytes.  Then the tibial baseplate was placed on the tibial surface with a drop antoine to confirm an appropriate cut and size of implant.  It was then pinned externally rotated to the medial third of the tibial tubercle.  Then trial reduction was then performed with the above implants and was found to be stable in all planes.  The patella tracked centrally on the trochlear groove.    The lug holes were drilled in the distal femur and the tibia was drilled an broached in the typical fashion.  The trial components were then removed and the final implants were confirmed and opened.  The bone surfaces were then irrigated and dried.  Palacos cement was then mixed and placed on the cut bone surfaces and back side of the implants.  The implants were then impacted into place, and the trial polyethylene spacer was placed and the knee was placed into extension.  A stack of towels was placed under the ankle and the cement was allowed to harden. The tourniquet was then dropped.  Hemostasis was obtained.  The wound was then irrigated with the pulse lavage irrigation system with a 3 liter mixture of betadine and normal saline.  The local mixture was injected  throughout the knee for post-operative pain control.  The IT band, popliteus tendon and PCL were released to improve soft tissue balancing. The final polyethylene implant was then inserted and the knee was flexed to 90 degrees and the arthrotomy was closed with #1 Strata-fix suture.  The deep dermis was closed with #1 strata-fix, and the skin was closed with 3-0 Monocryl suture.  Skin glue was applied and sterile dressing were applied.   Sponge and needle counts were completed and were correct.  The patient was awaken from anesthetic and was returned to recovery in stable condition.    Gregory Carvajal MD     Date: 5/15/2019  Time: 10:15 AM

## 2019-05-15 NOTE — PLAN OF CARE
Problem: Patient Care Overview  Goal: Plan of Care Review   05/15/19 2100   Coping/Psychosocial   Plan of Care Reviewed With patient   OTHER   Outcome Summary Pt admitted today s/p R TKA. Pt reports independence living at assisted living, using rollator prior to admission. Pt demonstrates impaired R knee ROM, strength, gait pattern, from baseline but able to ambulate 40' w/ contact assist using RW. Performed exercises w/ education for HEP. No equipment needs. Planning for DC to SNU.

## 2019-05-15 NOTE — THERAPY EVALUATION
Acute Care - Physical Therapy Initial Evaluation  [facility]     Patient Name: Gregoria Jimenez  : 1934  MRN: [id number]  Today's Date: 5/15/2019   Onset of Illness/Injury or Date of Surgery: 05/15/19     Referring Physician: Alena      Admit Date: 5/15/2019    Visit Dx: No diagnosis found.  Patient Active Problem List   Diagnosis   • Osteoarthritis of left knee   • Osteoarthritis of right knee     Past Medical History:   Diagnosis Date   • Arthritis    • Constipation    • Eye hemorrhage, right     BLOOD SHOT RIGHT EYE-2019 EYE EXAM-DR. ROSALES INFORMED NO SIG OF INFECTION-PT MOST LIKELY HAPPENED FRO A SNEEZE OR COUGH-BLOOD VESSELS BROKEN   • GERD (gastroesophageal reflux disease)    • History of bronchiectasis        • Hypertension    • Limited joint range of motion (ROM)     RIGHT KNEE   • Parkinson disease (CMS/HCC)    • Vitamin D deficiency      Past Surgical History:   Procedure Laterality Date   • CATARACT EXTRACTION W/ INTRAOCULAR LENS  IMPLANT, BILATERAL     • COLONOSCOPY W/ BIOPSIES AND POLYPECTOMY      BENIGN   • KNEE ARTHROSCOPY     • KY TOTAL KNEE ARTHROPLASTY Left 2/15/2017    Procedure: LT TOTAL KNEE ARTHROPLASTY;  Surgeon: Gregory Carvajal MD;  Location: [facility] OR;  Service: Orthopedics        PT ASSESSMENT (last 12 hours)      Physical Therapy Evaluation     Row Name 05/15/19 1322          PT Evaluation Time/Intention    Subjective Information  complains of;pain  -AR     Document Type  evaluation  -AR     Mode of Treatment  physical therapy  -AR     Patient Effort  good  -AR     Row Name 05/15/19 1324          General Information    Patient Profile Reviewed?  yes  -AR     Onset of Illness/Injury or Date of Surgery  05/15/19  -AR     Referring Physician  Alena  -AR     Prior Level of Function  min assist:  -AR     Equipment Currently Used at Home  rollator  -AR     Pertinent History of Current Functional Problem  s/p R TKA today  -AR     Existing Precautions/Restrictions  fall  -AR      Barriers to Rehab  none identified  -AR     Row Name 05/15/19 1322          Relationship/Environment    Lives With  facility resident from assisted living  -AR     Row Name 05/15/19 1322          Resource/Environmental Concerns    Current Living Arrangements  home/apartment/condo  -AR     Row Name 05/15/19 1322          Cognitive Assessment/Intervention- PT/OT    Orientation Status (Cognition)  oriented x 3  -AR     Follows Commands (Cognition)  WNL  -AR     Row Name 05/15/19 1322          Bed Mobility Assessment/Treatment    Comment (Bed Mobility)  not tested, up inchair  -AR     Row Name 05/15/19 1322          Transfer Assessment/Treatment    Transfer Assessment/Treatment  sit-stand transfer;stand-sit transfer  -AR     Sit-Stand Jerauld (Transfers)  contact guard  -AR     Stand-Sit Jerauld (Transfers)  contact guard  -AR     Row Name 05/15/19 1322          Sit-Stand Transfer    Assistive Device (Sit-Stand Transfers)  walker, front-wheeled  -AR     Row Name 05/15/19 1322          Stand-Sit Transfer    Assistive Device (Stand-Sit Transfers)  walker, front-wheeled  -AR     Row Name 05/15/19 1322          Gait/Stairs Assessment/Training    Gait/Stairs Assessment/Training  distance ambulated;gait pattern  -AR     Jerauld Level (Gait)  contact guard  -AR     Assistive Device (Gait)  walker, front-wheeled  -AR     Distance in Feet (Gait)  40  -AR     Pattern (Gait)  swing-to  -AR     Deviations/Abnormal Patterns (Gait)  antalgic;gait speed decreased;festinating/shuffling  -AR     Bilateral Gait Deviations  heel strike decreased;forward flexed posture  -AR     Comment (Gait/Stairs)  limited by pain and fatigue  -AR     Row Name 05/15/19 1322          General ROM    GENERAL ROM COMMENTS  WFL except R knee  -AR     Row Name 05/15/19 1322          MMT (Manual Muscle Testing)    General MMT Comments  WFL except R LE  -AR     Row Name 05/15/19 1322          Motor Assessment/Intervention    Additional  Documentation  Therapeutic Exercise Interventions (Group);Therapeutic Exercise (Group)  -AR     Row Name 05/15/19 1322          Therapeutic Exercise    Comment (Therapeutic Exercise)  R TKR protocol 10x  -AR     Row Name 05/15/19 1322          Pain Assessment    Additional Documentation  Pain Scale: Numbers Pre/Post-Treatment (Group)  -AR     Row Name 05/15/19 1322          Pain Scale: Numbers Pre/Post-Treatment    Pain Scale: Numbers, Pretreatment  4/10  -AR     Pain Scale: Numbers, Post-Treatment  5/10  -AR     Pain Location - Side  Right  -AR     Pain Location  knee  -AR     Pain Intervention(s)  Repositioned;Cold applied;Medication (See MAR)  -AR     Row Name             Wound 05/15/19 0925 Right knee incision    Wound - Properties Group Date first assessed: 05/15/19  -EG Time first assessed: 0925  -EG Side: Right  -EG Location: knee  -EG Type: incision  -EG    Row Name 05/15/19 1322          Physical Therapy Clinical Impression    Patient/Family Goals Statement (PT Clinical Impression)  DC to SNU   -AR     Criteria for Skilled Interventions Met (PT Clinical Impression)  yes  -AR     Pathology/Pathophysiology Noted (Describe Specifically for Each System)  musculoskeletal  -AR     Impairments Found (describe specific impairments)  aerobic capacity/endurance;gait, locomotion, and balance  -AR     Rehab Potential (PT Clinical Summary)  good, to achieve stated therapy goals  -AR     Row Name 05/15/19 1322          Vital Signs    O2 Delivery Pre Treatment  room air  -AR     Row Name 05/15/19 1322          Physical Therapy Goals    Transfer Goal Selection (PT)  transfer, PT goal 1  -AR     Gait Training Goal Selection (PT)  gait training, PT goal 1  -AR     ROM Goal Selection (PT)  ROM, PT goal 1  -AR     Additional Documentation  ROM Goal Selection (PT) (Row)  -AR     Row Name 05/15/19 1322          Transfer Goal 1 (PT)    Activity/Assistive Device (Transfer Goal 1, PT)  sit-to-stand/stand-to-sit;walker, rolling  -AR      [place] Level/Cues Needed (Transfer Goal 1, PT)  supervision required  -AR     Time Frame (Transfer Goal 1, PT)  1 week  -AR     Row Name 05/15/19 1322          Gait Training Goal 1 (PT)    Activity/Assistive Device (Gait Training Goal 1, PT)  gait (walking locomotion);walker, rolling  -AR     [place] Level (Gait Training Goal 1, PT)  supervision required  -AR     Distance (Gait Goal 1, PT)  150  -AR     Time Frame (Gait Training Goal 1, PT)  1 week  -AR     Row Name 05/15/19 1322          ROM Goal 1 (PT)    ROM Goal 1 (PT)  0-90 R knee  -AR     Time Frame (ROM Goal 1, PT)  1 week  -AR     Row Name 05/15/19 1322          Positioning and Restraints    Pre-Treatment Position  sitting in chair/recliner  -AR     Post Treatment Position  chair  -AR     In Chair  reclined;call light within reach;sitting;encouraged to call for assist;exit alarm on;with family/caregiver  -AR     Row Name 05/15/19 1322          Living Environment    Home Accessibility  -- no stairs to enter  -AR       User Key  (r) = Recorded By, (t) = Taken By, (c) = Cosigned By    Initials Name Provider Type    Joana Frausto PT Physical Therapist    Becca Lieberman RN Registered Nurse        Physical Therapy Education     Title: PT OT SLP Therapies (In Progress)     Topic: Physical Therapy (In Progress)     Point: Mobility training (In Progress)     Learning Progress Summary           Patient Acceptance, E, NR by AR at 5/15/2019  1:26 PM                   Point: Home exercise program (In Progress)     Learning Progress Summary           Patient Acceptance, E, NR by AR at 5/15/2019  1:26 PM                   Point: Body mechanics (In Progress)     Learning Progress Summary           Patient Acceptance, E, NR by AR at 5/15/2019  1:26 PM                   Point: Precautions (In Progress)     Learning Progress Summary           Patient Acceptance, E, NR by AR at 5/15/2019  1:26 PM                               User Key     Initials  Effective Dates Name Provider Type Discipline    AR 04/03/18 -  Joana Amor PT Physical Therapist PT              PT Recommendation and Plan  Anticipated Discharge Disposition (PT): skilled nursing facility  Planned Therapy Interventions (PT Eval): balance training, bed mobility training, gait training, home exercise program, patient/family education, ROM (range of motion), stair training, strengthening, transfer training  Therapy Frequency (PT Clinical Impression): 2 times/day  Outcome Summary/Treatment Plan (PT)  Anticipated Discharge Disposition (PT): skilled nursing facility  Plan of Care Reviewed With: patient  Outcome Summary: Pt admitted today s/p R TKA.  Pt reports independence living at assisted living, using rollator prior to admission.  Pt demonstrates impaired R knee ROM, strength, gait pattern, from baseline but able to ambulate 40' w/ contact assist using RW.   Performed exercises w/ education for HEP.  No equipment needs.  Planning for DC to SNU.       Time Calculation:   PT Charges     Row Name 05/15/19 1328             Time Calculation    Start Time  1302  -AR      Stop Time  1328  -AR      Time Calculation (min)  26 min  -AR      PT Received On  05/15/19  -AR      PT - Next Appointment  05/16/19  -AR      PT Goal Re-Cert Due Date  05/22/19  -AR        User Key  (r) = Recorded By, (t) = Taken By, (c) = Cosigned By    Initials Name Provider Type    AR Joana Amor PT Physical Therapist        Therapy Charges for Today     Code Description Service Date Service Provider Modifiers Qty    37902426700 HC PT EVAL LOW COMPLEXITY 2 5/15/2019 Joana Amor, PT GP 1    31875918719 HC PT THER PROC EA 15 MIN 5/15/2019 Joana Amor PT GP 1                 Joana Amor PT  5/15/2019

## 2019-05-15 NOTE — ANESTHESIA PREPROCEDURE EVALUATION
Anesthesia Evaluation     Patient summary reviewed and Nursing notes reviewed                Airway   Neck ROM: limited  Small opening  Dental      Pulmonary - negative pulmonary ROS   Cardiovascular     ECG reviewed  Rhythm: regular  Rate: normal    (+) hypertension,       Neuro/Psych- negative ROS  GI/Hepatic/Renal/Endo    (+)  GERD,      Musculoskeletal     Abdominal    Substance History - negative use     OB/GYN negative ob/gyn ROS         Other   (+) arthritis                     Anesthesia Plan    ASA 3     spinal   (Parkinson's :mild  Had post op delirium after previous knee surgery done under [facility] with narcs  Minimize narcotics and benzos)  Anesthetic plan, all risks, benefits, and alternatives have been provided, discussed and informed consent has been obtained with: patient.

## 2019-05-16 LAB
ANION GAP SERPL CALCULATED.3IONS-SCNC: 13.7 MMOL/L
BUN BLD-MCNC: 18 MG/DL (ref 8–23)
BUN/CREAT SERPL: 20 (ref 7–25)
CALCIUM SPEC-SCNC: 8.6 MG/DL (ref 8.6–10.5)
CHLORIDE SERPL-SCNC: 91 MMOL/L (ref 98–107)
CO2 SERPL-SCNC: 24.3 MMOL/L (ref 22–29)
CREAT BLD-MCNC: 0.9 MG/DL (ref 0.57–1)
GFR SERPL CREATININE-BSD FRML MDRD: 60 ML/MIN/1.73
GLUCOSE BLD-MCNC: 177 MG/DL (ref 65–99)
HCT VFR BLD AUTO: 32.1 % (ref 34–46.6)
HGB BLD-MCNC: 10.5 G/DL (ref 12–15.9)
POTASSIUM BLD-SCNC: 3.3 MMOL/L (ref 3.5–5.2)
POTASSIUM BLD-SCNC: 4 MMOL/L (ref 3.5–5.2)
SODIUM BLD-SCNC: 129 MMOL/L (ref 136–145)

## 2019-05-16 PROCEDURE — 85018 HEMOGLOBIN: CPT | Performed by: ORTHOPAEDIC SURGERY

## 2019-05-16 PROCEDURE — 97110 THERAPEUTIC EXERCISES: CPT

## 2019-05-16 PROCEDURE — 80048 BASIC METABOLIC PNL TOTAL CA: CPT | Performed by: ORTHOPAEDIC SURGERY

## 2019-05-16 PROCEDURE — 25010000003 CEFAZOLIN IN DEXTROSE 2-4 GM/100ML-% SOLUTION: Performed by: ORTHOPAEDIC SURGERY

## 2019-05-16 PROCEDURE — 85014 HEMATOCRIT: CPT | Performed by: ORTHOPAEDIC SURGERY

## 2019-05-16 PROCEDURE — 97150 GROUP THERAPEUTIC PROCEDURES: CPT

## 2019-05-16 PROCEDURE — 84132 ASSAY OF SERUM POTASSIUM: CPT | Performed by: ORTHOPAEDIC SURGERY

## 2019-05-16 RX ORDER — TRAMADOL HYDROCHLORIDE 50 MG/1
50 TABLET ORAL EVERY 4 HOURS PRN
Status: DISCONTINUED | OUTPATIENT
Start: 2019-05-16 | End: 2019-05-18 | Stop reason: HOSPADM

## 2019-05-16 RX ADMIN — CARBIDOPA AND LEVODOPA 1.5 TABLET: 25; 100 TABLET ORAL at 09:35

## 2019-05-16 RX ADMIN — TRAMADOL HYDROCHLORIDE 50 MG: 50 TABLET, COATED ORAL at 18:12

## 2019-05-16 RX ADMIN — SENNOSIDES,DOCUSATE SODIUM 2 TABLET: 50; 8.6 TABLET, FILM COATED ORAL at 09:34

## 2019-05-16 RX ADMIN — ASPIRIN 325 MG: 325 TABLET, COATED ORAL at 20:09

## 2019-05-16 RX ADMIN — GABAPENTIN 100 MG: 100 CAPSULE ORAL at 20:10

## 2019-05-16 RX ADMIN — CARBIDOPA AND LEVODOPA 1.5 TABLET: 25; 100 TABLET ORAL at 20:09

## 2019-05-16 RX ADMIN — POTASSIUM CHLORIDE 40 MEQ: 750 CAPSULE, EXTENDED RELEASE ORAL at 04:41

## 2019-05-16 RX ADMIN — SODIUM CHLORIDE, POTASSIUM CHLORIDE, SODIUM LACTATE AND CALCIUM CHLORIDE 75 ML/HR: 600; 310; 30; 20 INJECTION, SOLUTION INTRAVENOUS at 00:23

## 2019-05-16 RX ADMIN — CEFAZOLIN SODIUM 2 G: 2 INJECTION, SOLUTION INTRAVENOUS at 01:38

## 2019-05-16 RX ADMIN — MELOXICAM 15 MG: 15 TABLET ORAL at 09:35

## 2019-05-16 RX ADMIN — CARBIDOPA AND LEVODOPA 1.5 TABLET: 25; 100 TABLET ORAL at 16:29

## 2019-05-16 RX ADMIN — CHLORTHALIDONE: 25 TABLET ORAL at 09:35

## 2019-05-16 RX ADMIN — SODIUM CHLORIDE, PRESERVATIVE FREE 3 ML: 5 INJECTION INTRAVENOUS at 09:36

## 2019-05-16 RX ADMIN — PANTOPRAZOLE SODIUM 40 MG: 40 TABLET, DELAYED RELEASE ORAL at 06:41

## 2019-05-16 RX ADMIN — POTASSIUM CHLORIDE 40 MEQ: 750 CAPSULE, EXTENDED RELEASE ORAL at 09:34

## 2019-05-16 RX ADMIN — HYDROCODONE BITARTRATE AND ACETAMINOPHEN 1 TABLET: 10; 325 TABLET ORAL at 00:23

## 2019-05-16 RX ADMIN — ASPIRIN 325 MG: 325 TABLET, COATED ORAL at 09:34

## 2019-05-16 RX ADMIN — SODIUM CHLORIDE, PRESERVATIVE FREE 3 ML: 5 INJECTION INTRAVENOUS at 20:10

## 2019-05-16 RX ADMIN — ONDANSETRON 4 MG: 4 TABLET, FILM COATED ORAL at 12:30

## 2019-05-16 RX ADMIN — HYDROCODONE BITARTRATE AND ACETAMINOPHEN 1 TABLET: 10; 325 TABLET ORAL at 04:41

## 2019-05-16 RX ADMIN — CARBIDOPA AND LEVODOPA 2 TABLET: 25; 100 TABLET ORAL at 06:41

## 2019-05-16 RX ADMIN — SENNOSIDES,DOCUSATE SODIUM 2 TABLET: 50; 8.6 TABLET, FILM COATED ORAL at 20:09

## 2019-05-16 NOTE — PLAN OF CARE
Problem: Patient Care Overview  Goal: Plan of Care Review  Outcome: Ongoing (interventions implemented as appropriate)   05/16/19 1147   Coping/Psychosocial   Plan of Care Reviewed With patient   OTHER   Outcome Summary Pt progressing fair w/ R knee ROM and strengthening. Pt was seen at [facility] today d/t nausea/vomitting. R knee exercises completed, and plan to ambulate in PM.   Plan of Care Review   Progress improving

## 2019-05-16 NOTE — PROGRESS NOTES
Discharge Planning Assessment  [facility]     Patient Name: Gregoria Jimenez  MRN: [id number]  Today's Date: 5/16/2019    Admit Date: 5/15/2019    Discharge Needs Assessment     Row Name 05/16/19 1548       Living Environment    Lives With  facility resident    Current Living Arrangements  home/apartment/condo       Discharge Needs Assessment    Readmission Within the Last 30 Days  no previous admission in last 30 days    Concerns to be Addressed  discharge planning    Discharge Facility/Level of Care Needs  nursing facility, skilled    Row Name 05/16/19 1402       Living Environment    Lives With  facility resident    Current Living Arrangements  home/apartment/condo       Discharge Needs Assessment    Readmission Within the Last 30 Days  no previous admission in last 30 days    Concerns to be Addressed  discharge planning    Discharge Facility/Level of Care Needs  nursing facility, skilled        Discharge Plan     Row Name 05/16/19 1548       Plan    Plan  [facility]    Patient/Family in Agreement with Plan  yes    Plan Comments  Spoke with pt and family, pt is pre registered to d/c to [facility], referral given to [facility] with [facility] who states they are able to accept and will have bed available Saturday 5/18. Plan will be to d/c to [facility].        Destination - Selection Complete      Service Provider Request Status Selected Services Address Phone Number Fax Number    [facility] [address] [facility] [phone]      Durable Medical Equipment      No service coordination in this encounter.      Dialysis/Infusion      No service coordination in this encounter.      Home Medical Care      No service coordination in this encounter.      Therapy      No service coordination in this encounter.      Community Resources      No service coordination in this encounter.          Demographic Summary    No documentation.       Functional  Status    No documentation.       Psychosocial    No documentation.       Abuse/Neglect    No documentation.       Legal    No documentation.       Substance Abuse    No documentation.       Patient Forms    No documentation.           Kari Pollard RN

## 2019-05-16 NOTE — PLAN OF CARE
Problem: Patient Care Overview  Goal: Plan of Care Review  Outcome: Ongoing (interventions implemented as appropriate)   05/16/19 0301   Coping/Psychosocial   Plan of Care Reviewed With patient   OTHER   Outcome Summary HTN well controlled on PO meds. pain well controlled.   Plan of Care Review   Progress improving       Problem: Fall Risk (Adult)  Goal: Absence of Fall  Outcome: Ongoing (interventions implemented as appropriate)   05/16/19 0301   Fall Risk (Adult)   Absence of Fall making progress toward outcome

## 2019-05-16 NOTE — PLAN OF CARE
Problem: Patient Care Overview  Goal: Plan of Care Review  Outcome: Ongoing (interventions implemented as appropriate)   05/16/19 5895   Coping/Psychosocial   Plan of Care Reviewed With patient   OTHER   Outcome Summary VSS. Pt denies pain this shift. Dressing c/d/i. C/o Nausea. Zofran x1 after encouragement to take and gingerale given. Up to chair and worked with PT today. Voiding per BRP. Ambulates with assist and walker. Discussed bp med and monitoring r/t HTN. Plans to d/c to rehab.    Plan of Care Review   Progress improving       Problem: Fall Risk (Adult)  Goal: Absence of Fall  Outcome: Ongoing (interventions implemented as appropriate)      Problem: Knee Arthroplasty (Total, Partial) (Adult)  Goal: Signs and Symptoms of Listed Potential Problems Will be Absent, Minimized or Managed (Knee Arthroplasty)  Outcome: Ongoing (interventions implemented as appropriate)    Goal: Anesthesia/Sedation Recovery  Outcome: Ongoing (interventions implemented as appropriate)

## 2019-05-16 NOTE — DISCHARGE PLACEMENT REQUEST
"Tank Tidwell (85 y.o. Female)     Date of Birth Social Security Number Address Home Phone MRN    1934  [id number]    [place] [address] [phone] [id number]    Religious Marital Status          None Single       Admission Date Admission Type Admitting Provider Attending Provider Department, Room/Bed    5/15/19 Elective Gregory Carvajal MD Rhoads, David, MD [address], P889/1    Discharge Date Discharge Disposition Discharge Destination                       Attending Provider:  Gregory Carvajal MD    Allergies:  Sulfa Antibiotics    Isolation:  None   Infection:  None   Code Status:  CPR    Ht:  154.9 cm (60.98\")   Wt:  64.4 kg (142 lb)    Admission Cmt:  None   Principal Problem:  None                Active Insurance as of 5/15/2019     Primary Coverage     Payor Plan Insurance Group Employer/Plan Group    MEDICARE MEDICARE A & B      Payor Plan Address Payor Plan Phone Number Payor Plan Fax Number Effective Dates    [address] [phone]  1/1/1999 - None Entered    [facility] [id number]       Subscriber Name Subscriber Birth Date Member ID       TANK TIDWELL 1934 [id number]           Secondary Coverage     Payor Plan Insurance Group Employer/Plan Group    ANTHEM BLUE CROSS ANTHEM [facility] SUPP [id number]     Payor Plan Address Payor Plan Phone Number Payor Plan Fax Number Effective Dates    [address]   12/1/2016 - None Entered    [facility] [id number]       Subscriber Name Subscriber Birth Date Member ID       TANK TIDWELL 1934 [id number]                 Emergency Contacts      (Rel.) Home Phone Work Phone Mobile Phone    Kathi Abdalla (Sister) [phone] -- --        "

## 2019-05-16 NOTE — THERAPY TREATMENT NOTE
Acute Care - Physical Therapy Treatment Note  [facility]     Patient Name: Gregoria Jimenez  : 1934  MRN: [id number]  Today's Date: 2019  Onset of Illness/Injury or Date of Surgery: 05/15/19     Referring Physician: Alena    Admit Date: 5/15/2019    Visit Dx:  No diagnosis found.  Patient Active Problem List   Diagnosis   • Osteoarthritis of left knee   • Osteoarthritis of right knee       Therapy Treatment    Rehabilitation Treatment Summary     Row Name 19 1320 19 1134          Treatment Time/Intention    Discipline  physical therapy assistant  -  physical therapy assistant  -     Document Type  therapy note (daily note)  -  therapy note (daily note)  -     Subjective Information  complains of;pain;nausea/vomiting  -  complains of;pain  -     Mode of Treatment  physical therapy  -  physical therapy  -     Patient Effort  good  -SM  good  -SM     Existing Precautions/Restrictions  fall  -SM  fall  -SM     Recorded by [] Helen Dorsey, [facility] 19 1537 [SM] Helen Dorsey PTA 19 1146     Row Name 19 1134             Cognitive Assessment/Intervention    Additional Documentation  Cognitive Assessment/Intervention (Group)  -SM      Recorded by [SM] Helen Dorsey PTA 19 1146      Row Name 19 1320 19 1134          Cognitive Assessment/Intervention- PT/OT    Orientation Status (Cognition)  oriented x 3  -SM  oriented x 3  -SM     Follows Commands (Cognition)  WFL  -SM  WFL  -SM     Personal Safety Interventions  fall prevention program maintained;gait belt;nonskid shoes/slippers when out of bed  -  fall prevention program maintained  -SM     Recorded by [SM] Helen Dorsey PTA 19 1537 [SM] Helen Dorsey, [facility] 19 1146     Row Name 19 1320 19 1134          Bed Mobility Assessment/Treatment    Comment (Bed Mobility)  up in chair  -SM  up in chair  -SM     Recorded by [] Helen Dorsey, [facility]  05/16/19 1537 [SM] Helen Dorsey, PTA 05/16/19 1146     Row Name 05/16/19 1320 05/16/19 1134          Transfer Assessment/Treatment    Comment (Transfers)  NT - nauseated  -SM  NT - nauseated, exercises only  -SM     Recorded by [SM] Helen Dorsey, PTA 05/16/19 1537 [SM] Helen Dorsey, PTA 05/16/19 1146     Row Name 05/16/19 1320 05/16/19 1134          Therapeutic Exercise    Comment (Therapeutic Exercise)  R TKR protocol x 20 reps, unable to complete all exercises d/t nausea  -SM  R TKR protocol x 15 reps  -SM     Recorded by [SM] Helen Dorsey, PTA 05/16/19 1537 [SM] Helen Dorsey, [facility] 05/16/19 1146     Row Name 05/16/19 1320 05/16/19 1134          Positioning and Restraints    Pre-Treatment Position  sitting in chair/recliner  -SM  sitting in chair/recliner  -SM     Post Treatment Position  chair  -SM  chair  -SM     In Chair  reclined;call light within reach;encouraged to call for assist;exit alarm on;with family/caregiver;notified nsg  -SM  reclined;call light within reach;encouraged to call for assist;exit alarm on  -SM     Recorded by [SM] Helen Dorsey, PTA 05/16/19 1537 [SM] Helen Dorsey, PTA 05/16/19 1146     Row Name 05/16/19 1320 05/16/19 1134          Pain Scale: Numbers Pre/Post-Treatment    Pain Scale: Numbers, Pretreatment  4/10  -SM  6/10  -SM     Pain Scale: Numbers, Post-Treatment  5/10  -SM  6/10  -SM     Pain Location - Side  Right  -SM  Right  -SM     Pain Location  knee  -SM  knee  -SM     Pain Intervention(s)  Repositioned;Ambulation/increased activity;Rest  -SM  Repositioned;Ambulation/increased activity;Rest  -SM     Recorded by [SM] Helen Dorsey, PTA 05/16/19 1537 [SM] Helen Dorsey, [facility] 05/16/19 1146     Row Name                Wound 05/15/19 0925 Right knee incision    Wound - Properties Group Date first assessed: 05/15/19 [EG] Time first assessed: 0925 [EG] Side: Right [EG] Location: knee [EG] Type: incision [EG] Recorded by:  [EG]  Becca Huddleston RN 05/15/19 0925      User Key  (r) = Recorded By, (t) = Taken By, (c) = Cosigned By    Initials Name Effective Dates Discipline     Helen Dorsey PTA 03/07/18 -  PT    Becca Lieberman RN 07/10/17 -  Nurse          Wound 05/15/19 0925 Right knee incision (Active)   Dressing Appearance dry;intact;no drainage 5/16/2019 12:00 PM   Closure JULIA 5/16/2019 12:00 PM   Base dressing in place, unable to visualize 5/16/2019 12:00 PM           Physical Therapy Education     Title: PT OT SLP Therapies (In Progress)     Topic: Physical Therapy (In Progress)     Point: Mobility training (In Progress)     Learning Progress Summary           Patient Acceptance, E,D, NR by  at 5/16/2019 11:47 AM    Acceptance, E, NR by AR at 5/15/2019  1:26 PM                   Point: Home exercise program (In Progress)     Learning Progress Summary           Patient Acceptance, E,D, NR by  at 5/16/2019 11:47 AM    Acceptance, E, NR by AR at 5/15/2019  1:26 PM                   Point: Body mechanics (In Progress)     Learning Progress Summary           Patient Acceptance, E,D, NR by  at 5/16/2019 11:47 AM    Acceptance, E, NR by AR at 5/15/2019  1:26 PM                   Point: Precautions (In Progress)     Learning Progress Summary           Patient Acceptance, E,D, NR by  at 5/16/2019 11:47 AM    Acceptance, E, NR by AR at 5/15/2019  1:26 PM                               User Key     Initials Effective Dates Name Provider Type Discipline    AR 04/03/18 -  Joana Amor, PT Physical Therapist PT     03/07/18 -  Helen Dorsey PTA Physical Therapy Assistant PT                PT Recommendation and Plan  Anticipated Discharge Disposition (PT): skilled nursing facility  Outcome Summary/Treatment Plan (PT)  Anticipated Discharge Disposition (PT): skilled nursing facility  Plan of Care Reviewed With: patient  Progress: improving  Outcome Summary: Pt progressing fair w/ R knee ROM and strengthening. Pt  was seen at  today d/t nausea/vomitting. R knee exercises completed, and plan to ambulate in PM.  Outcome Measures     Row Name 05/16/19 1100 05/15/19 1300          How much help from another person do you currently need...    Turning from your back to your side while in flat bed without using bedrails?  3  -SM  4  -AR     Moving from lying on back to sitting on the side of a flat bed without bedrails?  3  -SM  3  -AR     Moving to and from a bed to a chair (including a wheelchair)?  3  -SM  3  -AR     Standing up from a chair using your arms (e.g., wheelchair, bedside chair)?  3  -SM  3  -AR     Climbing 3-5 steps with a railing?  2  -SM  2  -AR     To walk in hospital room?  2  -SM  3  -AR     AM-PAC 6 Clicks Score  16  -SM  18  -AR        Functional Assessment    Outcome Measure Options  AM-PAC 6 Clicks Basic Mobility (PT)  -SM  AM-PAC 6 Clicks Basic Mobility (PT)  -AR       User Key  (r) = Recorded By, (t) = Taken By, (c) = Cosigned By    Initials Name Provider Type    AR Joana Amor, PT Physical Therapist    Helen Moses PTA Physical Therapy Assistant         Time Calculation:   PT Charges     Row Name 05/16/19 1537 05/16/19 1301          Time Calculation    Start Time  1320  -  1134  -     Stop Time  1345  -  1158  -SM     Time Calculation (min)  25 min  -  24 min  -     PT Received On  05/16/19  -  05/16/19  -     PT - Next Appointment  05/17/19  -  05/16/19  -       User Key  (r) = Recorded By, (t) = Taken By, (c) = Cosigned By    Initials Name Provider Type     Helen Dorsey PTA Physical Therapy Assistant        Therapy Charges for Today     Code Description Service Date Service Provider Modifiers Qty    84527943852 HC PT THER PROC EA 15 MIN 5/16/2019 Helen Dorsey PTA GP 2    55281826647 HC PT THER PROC EA 15 MIN 5/16/2019 Helen Dorsey PTA GP 1    90230946388 HC PT THER PROC GROUP 5/16/2019 Helen Dorsey PTA GP 1          PT  G-Codes  Outcome Measure Options: AM-PAC 6 Clicks Basic Mobility (PT)  AM-PAC 6 Clicks Score: 16    Helen Dorsey, PTA  5/16/2019

## 2019-05-16 NOTE — PROGRESS NOTES
Procedure(s):  RIGHT TOTAL KNEE ARTHROPLASTY     LOS: 1 day     Subjective :   She is confused on where she is.  She thought she was in a assisted living facility.    Objective :    Vital signs in last 24 hours:  Vitals:    05/15/19 1928 05/15/19 2318 05/16/19 0230 05/16/19 0300   BP: 136/66 163/90  142/60   BP Location: Left arm Left arm  Left arm   Patient Position: Sitting Lying  Lying   Pulse: 64 64 68 62   Resp: 16 16 16    Temp: 97 °F (36.1 °C) 97 °F (36.1 °C) 96.7 °F (35.9 °C)    TempSrc: Oral Oral Oral    SpO2: 98% 96% 97%    Weight:       Height:           PHYSICAL EXAM:  Patient is calm, in no acute distress, awake and oriented x 1.  Reoriented quickly.  Dressing is clean, dry and intact.  No signs of infection.  Swelling is appropriate in amount.  Ecchymosis is appropriate in amount.  Homans test is negative.  Patient is neurovascularly intact distally.    LABS:  Results from last 7 days   Lab Units 05/16/19  0336   HEMOGLOBIN g/dL 10.5*   HEMATOCRIT % 32.1*     Results from last 7 days   Lab Units 05/16/19  0336   SODIUM mmol/L 129*   POTASSIUM mmol/L 3.3*   CHLORIDE mmol/L 91*   CO2 mmol/L 24.3   BUN mg/dL 18   CREATININE mg/dL 0.90   GLUCOSE mg/dL 177*   CALCIUM mg/dL 8.6             ASSESSMENT:  Status post Procedure(s):  RIGHT TOTAL KNEE ARTHROPLASTY      Plan:  Continue Physical Therapy, increase mobility and range of motion as tolerated.  Continue SCDs,   Aspirin 325 mg BID after discharge.  Dispo planning for rehab facility, likely Saturday.    Gregory Carvajal MD    Date: 5/16/2019  Time: 6:52 AM

## 2019-05-16 NOTE — THERAPY TREATMENT NOTE
Acute Care - Physical Therapy Treatment Note  [facility]     Patient Name: Gregoria Jimenez  : 1934  MRN: [id number]  Today's Date: 2019  Onset of Illness/Injury or Date of Surgery: 05/15/19     Referring Physician: Alena    Admit Date: 5/15/2019    Visit Dx:  No diagnosis found.  Patient Active Problem List   Diagnosis   • Osteoarthritis of left knee   • Osteoarthritis of right knee       Therapy Treatment    Rehabilitation Treatment Summary     Row Name 19 1134             Treatment Time/Intention    Discipline  physical therapy assistant  -      Document Type  therapy note (daily note)  -      Subjective Information  complains of;pain  -SM      Mode of Treatment  physical therapy  -SM      Patient Effort  good  -SM      Existing Precautions/Restrictions  fall  -SM      Recorded by [] Helen Dorsey [facility] 19 1146      Row Name 19 1134             Cognitive Assessment/Intervention    Additional Documentation  Cognitive Assessment/Intervention (Group)  -SM      Recorded by [] Helen Dorsey [facility] 19 1146      Row Name 19 1134             Cognitive Assessment/Intervention- PT/OT    Orientation Status (Cognition)  oriented x 3  -SM      Follows Commands (Cognition)  WFL  -SM      Personal Safety Interventions  fall prevention program maintained  -SM      Recorded by [] Helen Dorsey [facility] 19 1146      Row Name 19 1134             Bed Mobility Assessment/Treatment    Comment (Bed Mobility)  up in chair  -SM      Recorded by [] Helen Dorsey [facility] 19 1146      Row Name 19 1134             Transfer Assessment/Treatment    Comment (Transfers)  NT - nauseated, exercises only  -SM      Recorded by [] Helen Dorsey [facility] 19 1146      Row Name 19 1134             Therapeutic Exercise    Comment (Therapeutic Exercise)  R TKR protocol x 15 reps  -SM      Recorded by [] Helen Dorsey [facility] 19 1146       Row Name 05/16/19 1134             Positioning and Restraints    Pre-Treatment Position  sitting in chair/recliner  -SM      Post Treatment Position  chair  -SM      In Chair  reclined;call light within reach;encouraged to call for assist;exit alarm on  -SM      Recorded by [] Helen Dorsey Ivanna, [facility] 05/16/19 1146      Row Name 05/16/19 1134             Pain Scale: Numbers Pre/Post-Treatment    Pain Scale: Numbers, Pretreatment  6/10  -SM      Pain Scale: Numbers, Post-Treatment  6/10  -SM      Pain Location - Side  Right  -SM      Pain Location  knee  -SM      Pain Intervention(s)  Repositioned;Ambulation/increased activity;Rest  -SM      Recorded by [SM] Aminah Dorseyah Ivanna, [facility] 05/16/19 1146      Row Name                Wound 05/15/19 0925 Right knee incision    Wound - Properties Group Date first assessed: 05/15/19 [EG] Time first assessed: 0925 [EG] Side: Right [EG] Location: knee [EG] Type: incision [EG] Recorded by:  [EG] Becca Huddleston RN 05/15/19 0925      User Key  (r) = Recorded By, (t) = Taken By, (c) = Cosigned By    Initials Name Effective Dates Discipline     WillianAminahah Ivanna, PTA 03/07/18 -  PT    EG Becca Huddleston RN 07/10/17 -  Nurse          Wound 05/15/19 0925 Right knee incision (Active)   Dressing Appearance dry;intact;no drainage 5/16/2019  8:35 AM   Closure JULIA 5/16/2019  8:35 AM   Base dressing in place, unable to visualize 5/16/2019  8:35 AM           Physical Therapy Education     Title: PT OT SLP Therapies (In Progress)     Topic: Physical Therapy (In Progress)     Point: Mobility training (In Progress)     Learning Progress Summary           Patient Acceptance, E,D, NR by SHARMILA at 5/16/2019 11:47 AM    Acceptance, E, NR by AR at 5/15/2019  1:26 PM                   Point: Home exercise program (In Progress)     Learning Progress Summary           Patient Acceptance, E,D, NR by SHARMILA at 5/16/2019 11:47 AM    Acceptance, E, NR by AR at 5/15/2019  1:26 PM                    Point: Body mechanics (In Progress)     Learning Progress Summary           Patient Acceptance, E,D, NR by  at 5/16/2019 11:47 AM    Acceptance, E, NR by AR at 5/15/2019  1:26 PM                   Point: Precautions (In Progress)     Learning Progress Summary           Patient Acceptance, E,D, NR by  at 5/16/2019 11:47 AM    Acceptance, E, NR by AR at 5/15/2019  1:26 PM                               User Key     Initials Effective Dates Name Provider Type Discipline    AR 04/03/18 -  Joana Amor, PT Physical Therapist PT     03/07/18 -  Helen Dorsey, [facility] Physical Therapy Assistant PT                PT Recommendation and Plan  Anticipated Discharge Disposition (PT): skilled nursing facility  Outcome Summary/Treatment Plan (PT)  Anticipated Discharge Disposition (PT): skilled nursing facility  Plan of Care Reviewed With: patient  Progress: improving  Outcome Summary: Pt progressing fair w/ R knee ROM and strengthening. Pt was seen at  today d/t nausea/vomitting. R knee exercises completed, and plan to ambulate in PM.  Outcome Measures     Row Name 05/16/19 1100 05/15/19 1300          How much help from another person do you currently need...    Turning from your back to your side while in flat bed without using bedrails?  3  -SM  4  -AR     Moving from lying on back to sitting on the side of a flat bed without bedrails?  3  -SM  3  -AR     Moving to and from a bed to a chair (including a wheelchair)?  3  -SM  3  -AR     Standing up from a chair using your arms (e.g., wheelchair, bedside chair)?  3  -  3  -AR     Climbing 3-5 steps with a railing?  2  -SM  2  -AR     To walk in hospital room?  2  -SM  3  -AR     AM-PAC 6 Clicks Score  16  -SM  18  -AR        Functional Assessment    Outcome Measure Options  AM-PAC 6 Clicks Basic Mobility (PT)  -  AM-PAC 6 Clicks Basic Mobility (PT)  -AR       User Key  (r) = Recorded By, (t) = Taken By, (c) = Cosigned By    Initials Name Provider Type    AR  Joana Amor, PT Physical Therapist     Helen Dorsey PTA Physical Therapy Assistant         Time Calculation:   PT Charges     Row Name 05/16/19 1301             Time Calculation    Start Time  1134  -      Stop Time  1158  -      Time Calculation (min)  24 min  -      PT Received On  05/16/19  -      PT - Next Appointment  05/16/19  -        User Key  (r) = Recorded By, (t) = Taken By, (c) = Cosigned By    Initials Name Provider Type     Helen Dorsey PTA Physical Therapy Assistant        Therapy Charges for Today     Code Description Service Date Service Provider Modifiers Qty    67222575288 HC PT THER PROC EA 15 MIN 5/16/2019 Helen Dorsey PTA GP 2          PT G-Codes  Outcome Measure Options: AM-PAC 6 Clicks Basic Mobility (PT)  AM-PAC 6 Clicks Score: 16    Helen Dorsey PTA  5/16/2019

## 2019-05-17 LAB
BILIRUB UR QL STRIP: NEGATIVE
CLARITY UR: CLEAR
COLOR UR: YELLOW
GLUCOSE UR STRIP-MCNC: NEGATIVE MG/DL
HCT VFR BLD AUTO: 29.4 % (ref 34–46.6)
HGB BLD-MCNC: 9.8 G/DL (ref 12–15.9)
HGB UR QL STRIP.AUTO: NEGATIVE
KETONES UR QL STRIP: ABNORMAL
LEUKOCYTE ESTERASE UR QL STRIP.AUTO: NEGATIVE
NITRITE UR QL STRIP: NEGATIVE
PH UR STRIP.AUTO: 6.5 [PH] (ref 5–8)
PROT UR QL STRIP: NEGATIVE
SP GR UR STRIP: 1.02 (ref 1–1.03)
UROBILINOGEN UR QL STRIP: ABNORMAL

## 2019-05-17 PROCEDURE — 85018 HEMOGLOBIN: CPT | Performed by: ORTHOPAEDIC SURGERY

## 2019-05-17 PROCEDURE — 85014 HEMATOCRIT: CPT | Performed by: ORTHOPAEDIC SURGERY

## 2019-05-17 PROCEDURE — 97150 GROUP THERAPEUTIC PROCEDURES: CPT

## 2019-05-17 PROCEDURE — 97110 THERAPEUTIC EXERCISES: CPT

## 2019-05-17 PROCEDURE — 81003 URINALYSIS AUTO W/O SCOPE: CPT | Performed by: ORTHOPAEDIC SURGERY

## 2019-05-17 RX ORDER — SODIUM CHLORIDE, SODIUM LACTATE, POTASSIUM CHLORIDE, CALCIUM CHLORIDE 600; 310; 30; 20 MG/100ML; MG/100ML; MG/100ML; MG/100ML
50 INJECTION, SOLUTION INTRAVENOUS CONTINUOUS
Status: DISCONTINUED | OUTPATIENT
Start: 2019-05-17 | End: 2019-05-17

## 2019-05-17 RX ADMIN — CARBIDOPA AND LEVODOPA 1.5 TABLET: 25; 100 TABLET ORAL at 08:11

## 2019-05-17 RX ADMIN — MELOXICAM 15 MG: 15 TABLET ORAL at 08:10

## 2019-05-17 RX ADMIN — CARBIDOPA AND LEVODOPA 1.5 TABLET: 25; 100 TABLET ORAL at 22:44

## 2019-05-17 RX ADMIN — CHLORTHALIDONE: 25 TABLET ORAL at 08:13

## 2019-05-17 RX ADMIN — SODIUM CHLORIDE, PRESERVATIVE FREE 3 ML: 5 INJECTION INTRAVENOUS at 22:46

## 2019-05-17 RX ADMIN — CARBIDOPA AND LEVODOPA 2 TABLET: 25; 100 TABLET ORAL at 07:01

## 2019-05-17 RX ADMIN — GABAPENTIN 100 MG: 100 CAPSULE ORAL at 22:48

## 2019-05-17 RX ADMIN — ASPIRIN 325 MG: 325 TABLET, COATED ORAL at 08:13

## 2019-05-17 RX ADMIN — ASPIRIN 325 MG: 325 TABLET, COATED ORAL at 22:45

## 2019-05-17 RX ADMIN — SENNOSIDES,DOCUSATE SODIUM 2 TABLET: 50; 8.6 TABLET, FILM COATED ORAL at 22:45

## 2019-05-17 RX ADMIN — SENNOSIDES,DOCUSATE SODIUM 2 TABLET: 50; 8.6 TABLET, FILM COATED ORAL at 08:10

## 2019-05-17 RX ADMIN — PANTOPRAZOLE SODIUM 40 MG: 40 TABLET, DELAYED RELEASE ORAL at 07:01

## 2019-05-17 RX ADMIN — CARBIDOPA AND LEVODOPA 1.5 TABLET: 25; 100 TABLET ORAL at 15:32

## 2019-05-17 RX ADMIN — TRAMADOL HYDROCHLORIDE 50 MG: 50 TABLET, COATED ORAL at 07:01

## 2019-05-17 NOTE — PROGRESS NOTES
[facility]  Alex Damian MD    Patient Care Team:  Rosanne Bland MD as PCP - General (Geriatric Medicine)    Chief complaint: Urinary retention    Subjective .     History of present illness: This 85-year-old female just underwent a total right knee replacement by Dr. Loya.  She relates that prior to hospitalization she was having some difficulty voiding for about a week.  She just felt like her bladder was not emptying completely.  She has some suprapubic discomfort.  No burning with urination.  No hematuria or dysuria.    She denies any problems voiding previously.  No history of urinary tract infections.  Postoperatively she is been unable to void.  She is at high residuals and a catheter was placed.    Review of Systems  All systems were reviewed and were negative except for recovering from right knee surgery.  Inability to urinate.  No chest pain or shortness of air.    History  Past Medical History:   Diagnosis Date   • Arthritis    • Constipation    • Eye hemorrhage, right     BLOOD SHOT RIGHT EYE-05- EYE EXAM-DR. ROSALES INFORMED NO SIG OF INFECTION-PT MOST LIKELY HAPPENED FRO A SNEEZE OR COUGH-BLOOD VESSELS BROKEN   • GERD (gastroesophageal reflux disease)    • History of bronchiectasis     2015   • Hypertension    • Limited joint range of motion (ROM)     RIGHT KNEE   • Parkinson disease (CMS/HCC)    • Vitamin D deficiency    , Past Surgical History:   Procedure Laterality Date   • CATARACT EXTRACTION W/ INTRAOCULAR LENS  IMPLANT, BILATERAL     • COLONOSCOPY W/ BIOPSIES AND POLYPECTOMY      BENIGN   • KNEE ARTHROSCOPY     • TN TOTAL KNEE ARTHROPLASTY Left 2/15/2017    Procedure: LT TOTAL KNEE ARTHROPLASTY;  Surgeon: Gregory Carvajal MD;  Location: [facility] OR;  Service: Orthopedics   • TOTAL KNEE ARTHROPLASTY Right 5/15/2019    Procedure: RIGHT TOTAL KNEE ARTHROPLASTY;  Surgeon: Gregory Carvajal MD;  Location: [facility] OR;  Service: Orthopedics   , Family History   Problem  Relation Age of Onset   • Malig Hyperthermia Neg Hx    , Social History     Tobacco Use   • Smoking status: Never Smoker   • Smokeless tobacco: Never Used   Substance Use Topics   • Alcohol use: No   • Drug use: No   , Medications Prior to Admission   Medication Sig Dispense Refill Last Dose   • acetaminophen (TYLENOL) 325 MG tablet Take 1-2 tablets by mouth Every 4 (Four) Hours As Needed for moderate pain (4-6). 1 bottle 1 4/24/2019   • aspirin 81 MG chewable tablet Chew 81 mg Daily. HOLD PRIOR TO SURGERY   5/10/2019   • bisoprolol-hydrochlorothiazide (ZIAC) 5-6.25 MG per tablet Take 1 tablet by mouth Every Morning.   5/15/2019 at 0630   • carbidopa-levodopa (SINEMET)  MG per tablet Take 1.5 tablets by mouth 3 (Three) Times a Day.   5/14/2019 at 1800   • carbidopa-levodopa (SINEMET)  MG per tablet Take 2 tablets by mouth Every Morning.   5/15/2019 at 0630   • Cholecalciferol (VITAMIN D3) 31251 units tablet Take 1 tablet by mouth Every 14 (Fourteen) Days.   5/8/2019   • gabapentin (NEURONTIN) 100 MG capsule Take 100 mg by mouth Every Night.   5/14/2019 at 1800   • meloxicam (MOBIC) 15 MG tablet Take 15 mg by mouth Daily. HOLD PRIOR TO SURGERY   5/8/2019   • pantoprazole (PROTONIX) 20 MG EC tablet Take 20 mg by mouth Daily.   5/15/2019 at 0630   • sennosides-docusate sodium (SENOKOT-S) 8.6-50 MG tablet Take 2 tablets by mouth 2 (Two) Times a Day. 50 tablet 1 5/13/2019   , Scheduled Meds:    aspirin 325 mg Oral Q12H   atenolol-chlorthalidone (TENORETIC) 50-25 mg combo dose  Oral Q24H   carbidopa-levodopa 1.5 tablet Oral TID   carbidopa-levodopa 2 tablet Oral QAM   gabapentin 100 mg Oral Nightly   meloxicam 15 mg Oral Daily   pantoprazole 40 mg Oral Q AM   sennosides-docusate sodium 2 tablet Oral BID   sodium chloride 3 mL Intravenous Q12H   , Continuous Infusions:   , PRN Meds:  •  acetaminophen  •  bisacodyl  •  magnesium hydroxide  •  melatonin  •  [DISCONTINUED] HYDROmorphone **AND** naloxone  •   ondansetron **OR** ondansetron  •  potassium chloride  •  potassium chloride  •  sodium chloride  •  traMADol, Allergies:  Sulfa antibiotics and     Objective     Vital Signs   Temp:  [96.9 °F (36.1 °C)-98.8 °F (37.1 °C)] 98.8 °F (37.1 °C)  Heart Rate:  [56-61] 61  Resp:  [16] 16  BP: (145-169)/(52-66) 155/56    Physical Exam:     General Appearance:    Alert, cooperative, in no acute distress   Head:    Normocephalic, without obvious abnormality, atraumatic   Eyes:            Lids and lashes normal, conjunctivae and sclerae normal, no   icterus, no pallor, corneas clear, PERRLA   Ears:    Ears appear intact with no abnormalities noted   Throat:   No oral lesions, no thrush, oral mucosa moist   Neck:   No adenopathy, supple, trachea midline,   Back:     No kyphosis present, no scoliosis present, no skin lesions,       erythema or scars, no tenderness to percussion or                   palpation,   range of motion normal   Lungs:     Clear to auscultation,respirations regular, even and                   unlabored    Heart:    Regular rhythm and normal rate, normal S1 and S2, no            murmur, no gallop, no rub, no click   Breast Exam:    Deferred   Abdomen:     Normal bowel sounds, no masses, no organomegaly, soft        non-tender, non-distended, no guarding, no rebound                 tenderness   Genitalia:    Deferred   Extremities:   Moves all extremities well, no edema, no cyanosis, no              redness       Skin:   No bleeding, bruising or rash       Neurologic:   Cranial nerves 2 - 12 grossly intact, sensation intact,        Results Review:   I reviewed the patient's new clinical results.    Recent Results (from the past 24 hour(s))   Potassium    Collection Time: 05/16/19  1:50 PM   Result Value Ref Range    Potassium 4.0 3.5 - 5.2 mmol/L   Hemoglobin & Hematocrit, Blood    Collection Time: 05/17/19  4:02 AM   Result Value Ref Range    Hemoglobin 9.8 (L) 12.0 - 15.9 g/dL    Hematocrit 29.4 (L) 34.0 -  46.6 %   Urinalysis With Culture If Indicated - Urine, Catheter    Collection Time: 05/17/19  6:55 AM   Result Value Ref Range    Color, UA Yellow Yellow, Straw    Appearance, UA Clear Clear    pH, UA 6.5 5.0 - 8.0    Specific Gravity, UA 1.020 1.005 - 1.030    Glucose, UA Negative Negative    Ketones, UA Trace (A) Negative    Bilirubin, UA Negative Negative    Blood, UA Negative Negative    Protein, UA Negative Negative    Leuk Esterase, UA Negative Negative    Nitrite, UA Negative Negative    Urobilinogen, UA 0.2 E.U./dL 0.2 - 1.0 E.U./dL          Assessment/Plan       Osteoarthritis of right knee      Postoperative urinary retention    I discussed the patients findings and my recommendations with patient, family and Several family members were present.  Of explained her bladder needs to rest.  She will be going to a skilled rehab tomorrow.  I have asked him to continue with catheterization in the next week.  If she is doing well he can call me for orders to remove her catheter.  I have given them my name and phone number.    Alex Damian MD  05/17/19  11:51 AM    Time: >33

## 2019-05-17 NOTE — THERAPY TREATMENT NOTE
Acute Care - Physical Therapy Treatment Note  [facility]     Patient Name: Gregoria Jimenez  : 1934  MRN: [id number]  Today's Date: 2019  Onset of Illness/Injury or Date of Surgery: 05/15/19     Referring Physician: Alena    Admit Date: 5/15/2019    Visit Dx:    ICD-10-CM ICD-9-CM   1. Primary osteoarthritis of right knee M17.11 715.16     Patient Active Problem List   Diagnosis   • Osteoarthritis of left knee   • Osteoarthritis of right knee       Therapy Treatment    Rehabilitation Treatment Summary     Row Name 19 1332 19 0835          Treatment Time/Intention    Discipline  physical therapy assistant  -  physical therapy assistant  -     Document Type  therapy note (daily note)  -  therapy note (daily note)  -     Subjective Information  complains of;pain  -  complains of;pain  -     Mode of Treatment  physical therapy  -  physical therapy  -SM     Patient Effort  good  -SM  good  -SM     Existing Precautions/Restrictions  fall  -SM  fall  -SM     Recorded by [SM] Helen Dorsey, [facility] 19 1534 [SM] Helen Dorsey, [facility] 19 1310     Row Name 19 133 19 0835          Cognitive Assessment/Intervention- PT/OT    Orientation Status (Cognition)  oriented x 4  -SM  oriented x 3  -SM     Follows Commands (Cognition)  WNL  -SM  WFL  -SM     Personal Safety Interventions  fall prevention program maintained;gait belt;nonskid shoes/slippers when out of bed  -SM  fall prevention program maintained;gait belt;nonskid shoes/slippers when out of bed  -     Recorded by [SM] Helen Dorsey [facility] 19 1534 [SM] Helen Dorsey, [facility] 19 1310     Row Name 19 1332 19 0835          Bed Mobility Assessment/Treatment    Bed Mobility Assessment/Treatment  sit-supine  -  --     Sit-Supine [place] (Bed Mobility)  minimum assist (75% patient effort)  -  --     Bed Mobility, Safety Issues  decreased use of legs for bridging/pushing   -SM  --     Comment (Bed Mobility)  --  up in chair  -SM     Recorded by [SM] Helen Dorsey, [facility] 05/17/19 1534 [SM] Helen Dorsey, [facility] 05/17/19 1310     Row Name 05/17/19 1332 05/17/19 0835          Transfer Assessment/Treatment    Transfer Assessment/Treatment  sit-stand transfer;stand-sit transfer  -  sit-stand transfer;stand-sit transfer  -SM     Recorded by [SM] Helen Dorsey, [facility] 05/17/19 1534 [SM] Helen Dorsey, [facility] 05/17/19 1310     Row Name 05/17/19 1332 05/17/19 0835          Sit-Stand Transfer    Sit-Stand [place] (Transfers)  contact guard  -  contact guard  -SM     Assistive Device (Sit-Stand Transfers)  walker, front-wheeled  -SM  walker, front-wheeled  -SM     Recorded by [SM] Helen Dorsey, [facility] 05/17/19 1534 [SM] Helen Dorsey, [facility] 05/17/19 1310     Row Name 05/17/19 1332 05/17/19 0835          Stand-Sit Transfer    Stand-Sit [place] (Transfers)  contact guard  -  contact guard  -SM     Assistive Device (Stand-Sit Transfers)  walker, front-wheeled  -SM  walker, front-wheeled  -SM     Recorded by [SM] Helen Dorsey, [facility] 05/17/19 1534 [SM] Helen Dorsey, [facility] 05/17/19 1310     Row Name 05/17/19 1332 05/17/19 0835          Gait/Stairs Assessment/Training    [place] Level (Gait)  contact guard  -SM  contact guard  -SM     Assistive Device (Gait)  walker, front-wheeled  -SM  walker, front-wheeled  -SM     Distance in Feet (Gait)  80  -SM  80  -SM     Pattern (Gait)  step-to  -SM  step-to  -SM     Deviations/Abnormal Patterns (Gait)  antalgic;marcos decreased;stride length decreased  -SM  antalgic;marcos decreased;stride length decreased  -SM     Right Sided Gait Deviations  weight shift ability decreased  -SM  weight shift ability decreased  -SM     Recorded by [SM] Helen Dorsey, [facility] 05/17/19 1534 [SM] Helen Dorsey, [facility] 05/17/19 1310     Row Name 05/17/19 0835             General ROM    GENERAL ROM COMMENTS  R knee ROM  11-77  -SM      Recorded by [SM] Dorsey Helen Ivanna, PTA 05/17/19 1310      Row Name 05/17/19 1332 05/17/19 0835          Therapeutic Exercise    Comment (Therapeutic Exercise)  R TKR protocol x 30 reps  -SM  R TKR protocol x 25 reps  -SM     Recorded by [SM] Helen Dorsey, PTA 05/17/19 1534 [SM] DorseyHelen, PTA 05/17/19 1310     Row Name 05/17/19 1332 05/17/19 0835          Positioning and Restraints    Pre-Treatment Position  sitting in chair/recliner  -SM  sitting in chair/recliner  -SM     Post Treatment Position  bed  -SM  chair  -SM     In Bed  supine;call light within reach;encouraged to call for assist;exit alarm on  -SM  --     In Chair  --  reclined;call light within reach;encouraged to call for assist;exit alarm on  -SM     Recorded by [] Helen Dorsey, [facility] 05/17/19 1534 [] Helen Dorsey, PTA 05/17/19 1310     Row Name 05/17/19 1332 05/17/19 0835          Pain Scale: Numbers Pre/Post-Treatment    Pain Scale: Numbers, Pretreatment  3/10  -SM  3/10  -SM     Pain Scale: Numbers, Post-Treatment  3/10  -SM  3/10  -SM     Pain Location - Side  Right  -SM  Right  -SM     Pain Location  knee  -SM  knee  -SM     Pain Intervention(s)  Repositioned;Ambulation/increased activity;Rest  -SM  Repositioned;Ambulation/increased activity;Rest  -SM     Recorded by [] Helen Dorsey, PTA 05/17/19 1534 [SM] Willian Helen Ivanna, hospitals 05/17/19 1310     Row Name                Wound 05/15/19 0925 Right knee incision    Wound - Properties Group Date first assessed: 05/15/19 [EG] Time first assessed: 0925 [EG] Side: Right [EG] Location: knee [EG] Type: incision [EG] Recorded by:  [EG] Becca Huddleston RN 05/15/19 0925      User Key  (r) = Recorded By, (t) = Taken By, (c) = Cosigned By    Initials Name Effective Dates Discipline     Helen Dorsey, PTA 03/07/18 -  PT    Becca Lieberman, RN 07/10/17 -  Nurse          Wound 05/15/19 0925 Right knee incision (Active)   Dressing  Appearance dry;intact;no drainage 5/17/2019  8:13 AM   Closure JULIA 5/17/2019  8:13 AM   Base dressing in place, unable to visualize 5/17/2019  4:10 AM   Drainage Amount none 5/17/2019  8:13 AM   Dressing Care, Wound elastic bandage 5/17/2019 12:12 AM           Physical Therapy Education     Title: PT OT SLP Therapies (Done)     Topic: Physical Therapy (Done)     Point: Mobility training (Done)     Learning Progress Summary           Patient Acceptance, E,TB,D, VU,NR by  at 5/17/2019  1:10 PM    Acceptance, E,D, NR by  at 5/16/2019 11:47 AM    Acceptance, E, NR by AR at 5/15/2019  1:26 PM                   Point: Home exercise program (Done)     Learning Progress Summary           Patient Acceptance, E,TB,D, VU,NR by  at 5/17/2019  1:10 PM    Acceptance, E,D, NR by  at 5/16/2019 11:47 AM    Acceptance, E, NR by AR at 5/15/2019  1:26 PM                   Point: Body mechanics (Done)     Learning Progress Summary           Patient Acceptance, E,TB,D, VU,NR by  at 5/17/2019  1:10 PM    Acceptance, E,D, NR by  at 5/16/2019 11:47 AM    Acceptance, E, NR by AR at 5/15/2019  1:26 PM                   Point: Precautions (Done)     Learning Progress Summary           Patient Acceptance, E,TB,D, VU,NR by  at 5/17/2019  1:10 PM    Acceptance, E,D, NR by  at 5/16/2019 11:47 AM    Acceptance, E, NR by AR at 5/15/2019  1:26 PM                               User Key     Initials Effective Dates Name Provider Type Discipline    AR 04/03/18 -  Joana Amor, PT Physical Therapist PT     03/07/18 -  Helen Dorsey, PTA Physical Therapy Assistant PT                PT Recommendation and Plan  Anticipated Discharge Disposition (PT): skilled nursing facility  Outcome Summary/Treatment Plan (PT)  Anticipated Discharge Disposition (PT): skilled nursing facility  Plan of Care Reviewed With: patient  Progress: improving  Outcome Summary: Pt progressing well w/ R knee ROM and strengthening. Increased gait distance to  80ft w/ Rw and CGA. R knee ROM 11-77*.  Outcome Measures     Row Name 05/17/19 1300 05/16/19 1100 05/15/19 1300       How much help from another person do you currently need...    Turning from your back to your side while in flat bed without using bedrails?  3  -SM  3  -SM  4  -AR    Moving from lying on back to sitting on the side of a flat bed without bedrails?  3  -SM  3  -SM  3  -AR    Moving to and from a bed to a chair (including a wheelchair)?  3  -SM  3  -SM  3  -AR    Standing up from a chair using your arms (e.g., wheelchair, bedside chair)?  3  -SM  3  -SM  3  -AR    Climbing 3-5 steps with a railing?  2  -SM  2  -SM  2  -AR    To walk in hospital room?  3  -SM  2  -SM  3  -AR    AM-PAC 6 Clicks Score  17  -SM  16  -SM  18  -AR       Functional Assessment    Outcome Measure Options  AM-PAC 6 Clicks Basic Mobility (PT)  -SM  AM-PAC 6 Clicks Basic Mobility (PT)  -SM  AM-PAC 6 Clicks Basic Mobility (PT)  -AR      User Key  (r) = Recorded By, (t) = Taken By, (c) = Cosigned By    Initials Name Provider Type    Joana Frausto PT Physical Therapist    Helen Moses PTA Physical Therapy Assistant         Time Calculation:   PT Charges     Row Name 05/17/19 1534 05/17/19 1313          Time Calculation    Start Time  1332  -  0835  -     Stop Time  1402  -  0915  -SM     Time Calculation (min)  30 min  -SM  40 min  -SM     PT Received On  05/17/19  -  05/17/19  -     PT - Next Appointment  05/18/19  -  05/17/19  -       User Key  (r) = Recorded By, (t) = Taken By, (c) = Cosigned By    Initials Name Provider Type    Helen Moses PTA Physical Therapy Assistant        Therapy Charges for Today     Code Description Service Date Service Provider Modifiers Qty    63387597865 HC PT THER PROC EA 15 MIN 5/16/2019 Helen Dorsey PTA GP 2    66841149641 HC PT THER PROC EA 15 MIN 5/16/2019 Helen Dorsey PTA GP 1    94923603792 HC PT THER PROC GROUP 5/16/2019 Helen Dorsey  Ivanna, PTA GP 1    50840763426 HC PT THER PROC EA 15 MIN 5/17/2019 Helen Dorsey, PTA GP 1    23344623270 HC PT THER PROC GROUP 5/17/2019 Helen Dorsey, PTA GP 1    25402986816 HC PT THER PROC EA 15 MIN 5/17/2019 Helen Dorsey, PTA GP 2          PT G-Codes  Outcome Measure Options: AM-PAC 6 Clicks Basic Mobility (PT)  AM-PAC 6 Clicks Score: 17    Helen Dorsey PTA  5/17/2019

## 2019-05-17 NOTE — PLAN OF CARE
Problem: Patient Care Overview  Goal: Plan of Care Review  Outcome: Ongoing (interventions implemented as appropriate)   05/17/19 3905   OTHER   Outcome Summary VSS. minimal pain, no further nausea, ambulating to bathroom with one assist, discussed importance of monitoring B/P before meds, anticipating discharge to rehab on Sat.     Goal: Individualization and Mutuality  Outcome: Ongoing (interventions implemented as appropriate)    Goal: Discharge Needs Assessment  Outcome: Ongoing (interventions implemented as appropriate)      Problem: Fall Risk (Adult)  Goal: Absence of Fall  Outcome: Ongoing (interventions implemented as appropriate)

## 2019-05-17 NOTE — PROGRESS NOTES
Continued Stay Note  [facility]     Patient Name: Gregoria Jimenez  MRN: [id number]  Today's Date: 5/17/2019    Admit Date: 5/15/2019    Discharge Plan     Row Name 05/17/19 1318       Plan    Plan Comments  Pt to d/c to [facility] 5/18. Partial packet will be on chart, family to transport. No other needs identified.        Discharge Codes    No documentation.       Expected Discharge Date and Time     Expected Discharge Date Expected Discharge Time    May 18, 2019             Kari Pollard RN

## 2019-05-17 NOTE — PROGRESS NOTES
Procedure(s):  RIGHT TOTAL KNEE ARTHROPLASTY     LOS: 2 days     Subjective :   Complains of pain in knee and supra-pubic pain.    Objective :    Vital signs in last 24 hours:  Vitals:    05/16/19 1500 05/16/19 2012 05/16/19 2254 05/17/19 0247   BP: 169/61 168/63 162/66 145/52   BP Location: Left arm Left arm Left arm Right arm   Patient Position: Lying Lying Lying Lying   Pulse: 56 58 60 60   Resp: 16 16 16 16   Temp: 96.9 °F (36.1 °C) 97.5 °F (36.4 °C) 97.5 °F (36.4 °C) 98.8 °F (37.1 °C)   TempSrc: Oral Oral Oral Oral   SpO2: 96% 97% 96% 97%   Weight:       Height:           PHYSICAL EXAM:  Patient is calm, in no acute distress, awake and oriented x 3.  Dressing is clean, dry and intact.  No signs of infection.  Swelling is appropriate in amount.  Ecchymosis is appropriate in amount.  Homans test is negative.  Patient is neurovascularly intact distally.    LABS:  Results from last 7 days   Lab Units 05/17/19  0402   HEMOGLOBIN g/dL 9.8*   HEMATOCRIT % 29.4*     Results from last 7 days   Lab Units 05/16/19  1350 05/16/19  0336   SODIUM mmol/L  --  129*   POTASSIUM mmol/L 4.0 3.3*   CHLORIDE mmol/L  --  91*   CO2 mmol/L  --  24.3   BUN mg/dL  --  18   CREATININE mg/dL  --  0.90   GLUCOSE mg/dL  --  177*   CALCIUM mg/dL  --  8.6             ASSESSMENT:  Status post Procedure(s):  RIGHT TOTAL KNEE ARTHROPLASTY      Plan:  Continue Physical Therapy, increase mobility and range of motion as tolerated.  Continue SCDs, Aspirin 325 mg BID after discharge.  Check bladder scan now, (711)  Will see if can void, if not place brewer.  Dispo planning for rehab tomorrow.    Gregory Carvajal MD    Date: 5/17/2019  Time: 6:12 AM

## 2019-05-17 NOTE — PLAN OF CARE
Problem: Patient Care Overview  Goal: Plan of Care Review  Outcome: Ongoing (interventions implemented as appropriate)   05/17/19 1311   Coping/Psychosocial   Plan of Care Reviewed With patient   OTHER   Outcome Summary Pt progressing well w/ R knee ROM and strengthening. Increased gait distance to 80ft w/ Rw and CGA. R knee ROM 11-77*.   Plan of Care Review   Progress improving

## 2019-05-17 NOTE — DISCHARGE SUMMARY
Discharge Summary    Date of Admission: 5/15/2019  7:38 AM  Date of Discharge: 5/18/2019    Discharge Diagnosis:   Osteoarthritis of right knee, unspecified osteoarthritis type [M17.11]      PMHX:   Past Medical History:   Diagnosis Date   • Arthritis    • Constipation    • Eye hemorrhage, right     BLOOD SHOT RIGHT EYE-05- EYE EXAM-DR. ROSALES INFORMED NO SIG OF INFECTION-PT MOST LIKELY HAPPENED FRO A SNEEZE OR COUGH-BLOOD VESSELS BROKEN   • GERD (gastroesophageal reflux disease)    • History of bronchiectasis     2015   • Hypertension    • Limited joint range of motion (ROM)     RIGHT KNEE   • Parkinson disease (CMS/HCC)    • Vitamin D deficiency        Discharge Disposition  Rehab Facility or Unit (DC - External)    Procedures Performed  Procedure(s):  RIGHT TOTAL KNEE ARTHROPLASTY       Indication for Admission  Patient is a 85 y.o. female admitted after undergoing the above surgical procedure. They were admitted for post-operative pain control, medical management and physical therapy.  She had some urinary retention post-op for which a urinary catheter had to be placed.  She will follow up with urology post-op in 1-2 week.  They progressed with physical therapy.    They were deemed stable for discharge.      Consults:   Consults     No orders found from 4/16/2019 to 5/16/2019.          Discharge Instructions:  Patient is weight bearing as tolerated on the operative leg.  Patient is to progress range of motion and ambulation as tolerated.  Use walker as needed for stability and gait.  May progress to cane as tolerated.  The dressing should be left on knee until post-operative day 7, and then removed.  Dressing is waterproof. Patient may shower.  Use ace wrap as needed for swelling.  Patient will follow-up in the office in 2 weeks.  Call the office at [phone] for any questions or concerns.      Discharge Medications     Discharge Medications      Continue These Medications      Instructions Start  Date   acetaminophen 325 MG tablet  Commonly known as:  TYLENOL   325-650 mg, Oral, Every 4 Hours PRN      aspirin 81 MG chewable tablet   81 mg, Oral, Daily, HOLD PRIOR TO SURGERY      carbidopa-levodopa  MG per tablet  Commonly known as:  SINEMET   1.5 tablets, Oral, 3 Times Daily      carbidopa-levodopa  MG per tablet  Commonly known as:  SINEMET   2 tablets, Oral, Every Morning      gabapentin 100 MG capsule  Commonly known as:  NEURONTIN   100 mg, Oral, Nightly      meloxicam 15 MG tablet  Commonly known as:  MOBIC   15 mg, Oral, Daily, HOLD PRIOR TO SURGERY      pantoprazole 20 MG EC tablet  Commonly known as:  PROTONIX   20 mg, Oral, Daily      sennosides-docusate sodium 8.6-50 MG tablet  Commonly known as:  SENOKOT-S   2 tablets, Oral, 2 Times Daily      Vitamin D3 01864 units tablet   1 tablet, Oral, Every 14 Days      ZIAC 5-6.25 MG per tablet  Generic drug:  bisoprolol-hydrochlorothiazide   1 tablet, Oral, Every Morning             Discharge Diet:   Diet Instructions     Advance Diet As Tolerated            Activity at Discharge:   Activity Instructions     Activity as Tolerated            Follow-up Appointments  No future appointments.  Additional Instructions for the Follow-ups that You Need to Schedule     Discharge Follow-up with Specified Provider: Dr. Bobo office; 2 Weeks   As directed      To:  Dr. Bobo office    Follow Up:  2 Weeks         Referral to Home Health   As directed      Face to Face Visit Date:  5/17/2019    Follow-up Provider for Plan of Care?:  I will be treating the patient on an ongoing basis.  Please send me the Plan of Care for signature.    Follow-up Provider:  JAZ BOBO [[id number]]    Reason/Clinical Findings:  s/p TKA    Describe mobility limitations that make leaving home difficult:  taxing effort    Nursing/Therapeutic Services Requested:  Physical Therapy    PT orders:  Total joint pathway    Frequency:  1 Week 1               Test Results Pending at  Discharge   Order Current Status    Urinalysis With Culture If Indicated - Urine, Catheter Collected (05/17/19 0682)           Gregory Carvajal MD  05/17/19,  6:58 AM

## 2019-05-17 NOTE — PLAN OF CARE
Problem: Patient Care Overview  Goal: Plan of Care Review  Outcome: Ongoing (interventions implemented as appropriate)   05/17/19 1311 05/17/19 9982   Coping/Psychosocial   Plan of Care Reviewed With patient --    OTHER   Outcome Summary --  Pt POD 2 of R TKA. VSS. NVI. Dressing c/d/i. Pt ambulating per walker x1 assist with gait belt. Pain well controlled with PO pain medicaiton. Pt has brewer in place. Will cont to monitor.    Plan of Care Review   Progress improving --        Problem: Fall Risk (Adult)  Goal: Absence of Fall  Outcome: Ongoing (interventions implemented as appropriate)      Problem: Knee Arthroplasty (Total, Partial) (Adult)  Goal: Signs and Symptoms of Listed Potential Problems Will be Absent, Minimized or Managed (Knee Arthroplasty)  Outcome: Ongoing (interventions implemented as appropriate)

## 2019-05-17 NOTE — THERAPY TREATMENT NOTE
Acute Care - Physical Therapy Treatment Note  [facility]     Patient Name: Gregoria Jimenez  : 1934  MRN: [id number]  Today's Date: 2019  Onset of Illness/Injury or Date of Surgery: 05/15/19     Referring Physician: Alena    Admit Date: 5/15/2019    Visit Dx:    ICD-10-CM ICD-9-CM   1. Primary osteoarthritis of right knee M17.11 715.16     Patient Active Problem List   Diagnosis   • Osteoarthritis of left knee   • Osteoarthritis of right knee       Therapy Treatment    Rehabilitation Treatment Summary     Row Name 19 0835             Treatment Time/Intention    Discipline  physical therapy assistant  -      Document Type  therapy note (daily note)  -      Subjective Information  complains of;pain  -      Mode of Treatment  physical therapy  -      Patient Effort  good  -      Existing Precautions/Restrictions  fall  -SM      Recorded by [] Helen Dorsey [facility] 19 1310      Row Name 19 0835             Cognitive Assessment/Intervention- PT/OT    Orientation Status (Cognition)  oriented x 3  -SM      Follows Commands (Cognition)  WFL  -      Personal Safety Interventions  fall prevention program maintained;gait belt;nonskid shoes/slippers when out of bed  -      Recorded by [SM] Helen Dorsey PTA 19 1310      Row Name 19 0835             Bed Mobility Assessment/Treatment    Comment (Bed Mobility)  up in chair  -      Recorded by [] Helen Dorsey PTA 19 1310      Row Name 19 0835             Transfer Assessment/Treatment    Transfer Assessment/Treatment  sit-stand transfer;stand-sit transfer  -      Recorded by [] Helen Dorsey PTA 19 1310      Row Name 19 0835             Sit-Stand Transfer    Sit-Stand [place] (Transfers)  contact guard  -      Assistive Device (Sit-Stand Transfers)  walker, front-wheeled  -      Recorded by [] Helen Dorsey PTA 19 1310      Row Name 19  0835             Stand-Sit Transfer    Stand-Sit [place] (Transfers)  contact guard  -      Assistive Device (Stand-Sit Transfers)  walker, front-wheeled  -SM      Recorded by [SM] Helen Dorsey [facility] 05/17/19 1310      Row Name 05/17/19 0835             Gait/Stairs Assessment/Training    [place] Level (Gait)  contact guard  -SM      Assistive Device (Gait)  walker, front-wheeled  -SM      Distance in Feet (Gait)  80  -SM      Pattern (Gait)  step-to  -SM      Deviations/Abnormal Patterns (Gait)  antalgic;marcos decreased;stride length decreased  -SM      Right Sided Gait Deviations  weight shift ability decreased  -SM      Recorded by [SM] Helen Dorsey [facility] 05/17/19 1310      Row Name 05/17/19 0835             General ROM    GENERAL ROM COMMENTS  R knee ROM 11-77  -SM      Recorded by [SM] Helen Dorsey [facility] 05/17/19 1310      Row Name 05/17/19 0835             Therapeutic Exercise    Comment (Therapeutic Exercise)  R TKR protocol x 25 reps  -SM      Recorded by [SM] Helen Dorsey [facility] 05/17/19 1310      Row Name 05/17/19 0835             Positioning and Restraints    Pre-Treatment Position  sitting in chair/recliner  -SM      Post Treatment Position  chair  -SM      In Chair  reclined;call light within reach;encouraged to call for assist;exit alarm on  -SM      Recorded by [SM] Helen Dorsey [facility] 05/17/19 1310      Row Name 05/17/19 0835             Pain Scale: Numbers Pre/Post-Treatment    Pain Scale: Numbers, Pretreatment  3/10  -SM      Pain Scale: Numbers, Post-Treatment  3/10  -SM      Pain Location - Side  Right  -SM      Pain Location  knee  -SM      Pain Intervention(s)  Repositioned;Ambulation/increased activity;Rest  -SM      Recorded by [SM] Helen Dorsey [facility] 05/17/19 1310      Row Name                Wound 05/15/19 0925 Right knee incision    Wound - Properties Group Date first assessed: 05/15/19 [EG] Time first assessed: 0925 [EG] Side: Right [EG]  Location: knee [EG] Type: incision [EG] Recorded by:  [EG] Becca Huddleston, STACEY 05/15/19 0924      User Key  (r) = Recorded By, (t) = Taken By, (c) = Cosigned By    Initials Name Effective Dates Discipline     Dorsey Helen Goncalves, PTA 03/07/18 -  PT    EG Becca Huddleston, RN 07/10/17 -  Nurse          Wound 05/15/19 4691 Right knee incision (Active)   Dressing Appearance dry;intact;no drainage 5/17/2019  8:13 AM   Closure JULIA 5/17/2019  8:13 AM   Base dressing in place, unable to visualize 5/17/2019  4:10 AM   Drainage Amount none 5/17/2019  8:13 AM   Dressing Care, Wound elastic bandage 5/17/2019 12:12 AM           Physical Therapy Education     Title: PT OT SLP Therapies (Done)     Topic: Physical Therapy (Done)     Point: Mobility training (Done)     Learning Progress Summary           Patient Acceptance, E,TB,D, VU,NR by  at 5/17/2019  1:10 PM    Acceptance, E,D, NR by  at 5/16/2019 11:47 AM    Acceptance, E, NR by AR at 5/15/2019  1:26 PM                   Point: Home exercise program (Done)     Learning Progress Summary           Patient Acceptance, E,TB,D, VU,NR by  at 5/17/2019  1:10 PM    Acceptance, E,D, NR by  at 5/16/2019 11:47 AM    Acceptance, E, NR by AR at 5/15/2019  1:26 PM                   Point: Body mechanics (Done)     Learning Progress Summary           Patient Acceptance, E,TB,D, VU,NR by  at 5/17/2019  1:10 PM    Acceptance, E,D, NR by  at 5/16/2019 11:47 AM    Acceptance, E, NR by AR at 5/15/2019  1:26 PM                   Point: Precautions (Done)     Learning Progress Summary           Patient Acceptance, E,TB,D, VU,NR by  at 5/17/2019  1:10 PM    Acceptance, E,D, NR by  at 5/16/2019 11:47 AM    Acceptance, E, NR by AR at 5/15/2019  1:26 PM                               User Key     Initials Effective Dates Name Provider Type Discipline    AR 04/03/18 -  Joana Amor, PT Physical Therapist PT     03/07/18 -  Helen Dorsey, XAVIER Physical Therapy Assistant PT                 PT Recommendation and Plan  Anticipated Discharge Disposition (PT): skilled nursing facility  Outcome Summary/Treatment Plan (PT)  Anticipated Discharge Disposition (PT): skilled nursing facility  Plan of Care Reviewed With: patient  Progress: improving  Outcome Summary: Pt progressing well w/ R knee ROM and strengthening. Increased gait distance to 80ft w/ Rw and CGA. R knee ROM 11-77*.  Outcome Measures     Row Name 05/17/19 1300 05/16/19 1100 05/15/19 1300       How much help from another person do you currently need...    Turning from your back to your side while in flat bed without using bedrails?  3  -SM  3  -SM  4  -AR    Moving from lying on back to sitting on the side of a flat bed without bedrails?  3  -SM  3  -SM  3  -AR    Moving to and from a bed to a chair (including a wheelchair)?  3  -SM  3  -SM  3  -AR    Standing up from a chair using your arms (e.g., wheelchair, bedside chair)?  3  -SM  3  -SM  3  -AR    Climbing 3-5 steps with a railing?  2  -SM  2  -SM  2  -AR    To walk in hospital room?  3  -SM  2  -SM  3  -AR    AM-PAC 6 Clicks Score  17  -SM  16  -SM  18  -AR       Functional Assessment    Outcome Measure Options  AM-PAC 6 Clicks Basic Mobility (PT)  -SM  AM-PAC 6 Clicks Basic Mobility (PT)  -SM  AM-PAC 6 Clicks Basic Mobility (PT)  -AR      User Key  (r) = Recorded By, (t) = Taken By, (c) = Cosigned By    Initials Name Provider Type    Joana Frausto, PT Physical Therapist    Helen Moses PTA Physical Therapy Assistant         Time Calculation:   PT Charges     Row Name 05/17/19 1313             Time Calculation    Start Time  0835  -      Stop Time  0915  -      Time Calculation (min)  40 min  -      PT Received On  05/17/19  -      PT - Next Appointment  05/17/19  -        User Key  (r) = Recorded By, (t) = Taken By, (c) = Cosigned By    Initials Name Provider Type    Helen Moses PTA Physical Therapy Assistant        Therapy Charges for  Today     Code Description Service Date Service Provider Modifiers Qty    77769652132 HC PT THER PROC EA 15 MIN 5/16/2019 Willian Helenreggie Goncalves, PTA GP 2    45897647938 HC PT THER PROC EA 15 MIN 5/16/2019 Helen Dorsey Ivanna, PTA GP 1    72981707909 HC PT THER PROC GROUP 5/16/2019 Helen Dorsey Ivanna, PTA GP 1    22131277609 HC PT THER PROC EA 15 MIN 5/17/2019 Helen Dorsey Ivanna, PTA GP 1    60150511346 HC PT THER PROC GROUP 5/17/2019 Helen Dorsey Ivanna, PTA GP 1          PT G-Codes  Outcome Measure Options: AM-PAC 6 Clicks Basic Mobility (PT)  AM-PAC 6 Clicks Score: 17    Helen Ivanna Dorsey PTA  5/17/2019

## 2019-05-18 VITALS
RESPIRATION RATE: 16 BRPM | BODY MASS INDEX: 26.81 KG/M2 | SYSTOLIC BLOOD PRESSURE: 108 MMHG | OXYGEN SATURATION: 95 % | WEIGHT: 142 LBS | HEART RATE: 64 BPM | HEIGHT: 61 IN | TEMPERATURE: 97.3 F | DIASTOLIC BLOOD PRESSURE: 53 MMHG

## 2019-05-18 LAB
HCT VFR BLD AUTO: 27.1 % (ref 34–46.6)
HGB BLD-MCNC: 9.2 G/DL (ref 12–15.9)

## 2019-05-18 PROCEDURE — 97110 THERAPEUTIC EXERCISES: CPT

## 2019-05-18 PROCEDURE — 97150 GROUP THERAPEUTIC PROCEDURES: CPT

## 2019-05-18 PROCEDURE — 85014 HEMATOCRIT: CPT | Performed by: ORTHOPAEDIC SURGERY

## 2019-05-18 PROCEDURE — 85018 HEMOGLOBIN: CPT | Performed by: ORTHOPAEDIC SURGERY

## 2019-05-18 RX ADMIN — PANTOPRAZOLE SODIUM 40 MG: 40 TABLET, DELAYED RELEASE ORAL at 04:57

## 2019-05-18 RX ADMIN — MELOXICAM 15 MG: 15 TABLET ORAL at 08:01

## 2019-05-18 RX ADMIN — CARBIDOPA AND LEVODOPA 2 TABLET: 25; 100 TABLET ORAL at 04:56

## 2019-05-18 RX ADMIN — TRAMADOL HYDROCHLORIDE 50 MG: 50 TABLET, COATED ORAL at 04:57

## 2019-05-18 RX ADMIN — ASPIRIN 325 MG: 325 TABLET, COATED ORAL at 08:02

## 2019-05-18 RX ADMIN — CARBIDOPA AND LEVODOPA 1.5 TABLET: 25; 100 TABLET ORAL at 08:01

## 2019-05-18 NOTE — PROGRESS NOTES
Orthopedic Progress Note    Subjective :   Patient seen and examined. Resting comfortably. No issues overnight. Pain controlled. Walked yesterday with PT.    Objective :    Vital signs in last 24 hours:  Temp:  [97.5 °F (36.4 °C)-98.8 °F (37.1 °C)] 97.5 °F (36.4 °C)  Heart Rate:  [] 62  Resp:  [16] 16  BP: (108-155)/(43-66) 108/43  Vitals:    05/17/19 1940 05/17/19 1950 05/17/19 2314 05/18/19 0311   BP:  145/59 124/66 108/43   BP Location:  Right arm Right arm Right arm   Patient Position:  Lying Lying Lying   Pulse: 64 58 68 62   Resp:  16 16 16   Temp:  97.8 °F (36.6 °C) 98.1 °F (36.7 °C) 97.5 °F (36.4 °C)   TempSrc:  Oral Oral Oral   SpO2:  96% 94% 93%   Weight:       Height:           PHYSICAL EXAM:  Patient is calm, in no acute distress, awake and oriented x 3.  Dressing clean, dry and intact.  No signs of infection.  Swelling is appropriate.  Ecchymosis is appropriate in amount.  Homans test is negative.  Patient is neurovascularly intact distally.  EHL,FHL,TA,GS are intact  DP/TP 2+    LABS:  Results from last 7 days   Lab Units 05/18/19  0311   HEMOGLOBIN g/dL 9.2*   HEMATOCRIT % 27.1*     Results from last 7 days   Lab Units 05/16/19  1350 05/16/19  0336   SODIUM mmol/L  --  129*   POTASSIUM mmol/L 4.0 3.3*   CHLORIDE mmol/L  --  91*   CO2 mmol/L  --  24.3   BUN mg/dL  --  18   CREATININE mg/dL  --  0.90   GLUCOSE mg/dL  --  177*   CALCIUM mg/dL  --  8.6           ASSESSMENT:  Status post right total knee arthroplasty, POD#3    Plan:  Continue Physical Therapy, WBAT. ROM as tolerated with Physical therapy  Continue SCDs, Continue Lovenox in hospital for DVT prophylaxis. Plan for discharge home with ECASA 325mg one tab PO BID x30 days.  Dispo planning for rehab today.    Follow-up in Dr. Carvajal office in 3 weeks.    Rd Cortez PA-C    Date: 5/18/2019  Time: 6:58 AM

## 2019-05-18 NOTE — PLAN OF CARE
Problem: Patient Care Overview  Goal: Plan of Care Review  Outcome: Ongoing (interventions implemented as appropriate)   05/18/19 1947   OTHER   Outcome Summary BSC with walker with one assist and gait belt, po pain meds effective. F/C for urinary retention, to keep upon discharge, plan to discharge today to rehab.     Goal: Individualization and Mutuality  Outcome: Ongoing (interventions implemented as appropriate)    Goal: Discharge Needs Assessment  Outcome: Ongoing (interventions implemented as appropriate)      Problem: Fall Risk (Adult)  Goal: Absence of Fall  Outcome: Ongoing (interventions implemented as appropriate)

## 2019-05-19 NOTE — THERAPY TREATMENT NOTE
Acute Care - Physical Therapy Treatment Note  [facility]     Patient Name: Gregoria Jimenez  : 1934  MRN: [id number]  Today's Date: 2019  Onset of Illness/Injury or Date of Surgery: 05/15/19     Referring Physician: Alena    Admit Date: 5/15/2019    Visit Dx:    ICD-10-CM ICD-9-CM   1. Primary osteoarthritis of right knee M17.11 715.16     Patient Active Problem List   Diagnosis   • Osteoarthritis of left knee   • Osteoarthritis of right knee       Therapy Treatment    Rehabilitation Treatment Summary     Row Name                Wound 05/15/19 0925 Right knee incision    Wound - Properties Group Date first assessed: 05/15/19 [EG] Time first assessed: 925 [EG] Side: Right [EG] Location: knee [EG] Type: incision [EG] Recorded by:  [EG] Becca Huddleston RN 05/15/19 0925      User Key  (r) = Recorded By, (t) = Taken By, (c) = Cosigned By    Initials Name Effective Dates Discipline    EG Becca Huddleston RN 07/10/17 -  Nurse                   Physical Therapy Education     Title: PT OT SLP Therapies (Resolved)     Topic: Physical Therapy (Resolved)     Point: Mobility training (Resolved)     Learning Progress Summary           Patient Acceptance, E,TB,D, VU,NR by  at 2019  1:10 PM    Acceptance, E,D, NR by  at 2019 11:47 AM    Acceptance, E, NR by AR at 5/15/2019  1:26 PM                   Point: Home exercise program (Resolved)     Learning Progress Summary           Patient Acceptance, E,TB,D, VU,NR by  at 2019  1:10 PM    Acceptance, E,D, NR by  at 2019 11:47 AM    Acceptance, E, NR by AR at 5/15/2019  1:26 PM                   Point: Body mechanics (Resolved)     Learning Progress Summary           Patient Acceptance, E,TB,D, VU,NR by  at 2019  1:10 PM    Acceptance, E,D, NR by  at 2019 11:47 AM    Acceptance, E, NR by AR at 5/15/2019  1:26 PM                   Point: Precautions (Resolved)     Learning Progress Summary           Patient Acceptance, E,TB,D,  VU,NR by  at 5/17/2019  1:10 PM    Acceptance, E,D, NR by  at 5/16/2019 11:47 AM    Acceptance, E, NR by AR at 5/15/2019  1:26 PM                               User Key     Initials Effective Dates Name Provider Type Discipline    AR 04/03/18 -  Joana Amor, PT Physical Therapist PT     03/07/18 -  Helen Dorsey PTA Physical Therapy Assistant PT                PT Recommendation and Plan        Outcome Measures     Row Name 05/17/19 1300             How much help from another person do you currently need...    Turning from your back to your side while in flat bed without using bedrails?  3  -SM      Moving from lying on back to sitting on the side of a flat bed without bedrails?  3  -SM      Moving to and from a bed to a chair (including a wheelchair)?  3  -SM      Standing up from a chair using your arms (e.g., wheelchair, bedside chair)?  3  -SM      Climbing 3-5 steps with a railing?  2  -SM      To walk in hospital room?  3  -SM      AM-PAC 6 Clicks Score  17  -SM         Functional Assessment    Outcome Measure Options  AM-PAC 6 Clicks Basic Mobility (PT)  -        User Key  (r) = Recorded By, (t) = Taken By, (c) = Cosigned By    Initials Name Provider Type     Helen Dorsey PTA Physical Therapy Assistant         Time Calculation:     Therapy Charges for Today     Code Description Service Date Service Provider Modifiers Qty    29482911549 HC PT THER PROC EA 15 MIN 5/18/2019 Chen Dorsey PTA GP 1    10726425037 HC PT THER PROC GROUP 5/18/2019 Chen Dorsey PTA GP 1          PT G-Codes  Outcome Measure Options: AM-PAC 6 Clicks Basic Mobility (PT)  AM-PAC 6 Clicks Score: 17    Chen Dorsey PTA  5/19/2019

## 2019-05-20 NOTE — PROGRESS NOTES
Case Management Discharge Note    Final Note: Discharged to [facility].     Destination - Selection Complete      Service Provider Request Status Selected Services Address Phone Number Fax Number    [facility] [address] [place] [address] [phone]      Durable Medical Equipment      No service has been selected for the patient.      Dialysis/Infusion      No service has been selected for the patient.      Home Medical Care      No service has been selected for the patient.      Therapy      No service has been selected for the patient.      Community Resources      No service has been selected for the patient.        Transportation Services  Private: Car    Final Discharge Disposition Code: 03 - skilled nursing facility (SNF)

## 2020-05-06 ENCOUNTER — HOSPITAL ENCOUNTER (INPATIENT)
Facility: HOSPITAL | Age: 85
LOS: 6 days | Discharge: HOME HEALTH | End: 2020-05-13
Attending: EMERGENCY MEDICINE | Admitting: HOSPITALIST

## 2020-05-06 ENCOUNTER — APPOINTMENT (OUTPATIENT)
Dept: CT IMAGING | Facility: HOSPITAL | Age: 85
End: 2020-05-06

## 2020-05-06 ENCOUNTER — APPOINTMENT (OUTPATIENT)
Dept: GENERAL RADIOLOGY | Facility: HOSPITAL | Age: 85
End: 2020-05-06

## 2020-05-06 DIAGNOSIS — E87.1 HYPONATREMIA: ICD-10-CM

## 2020-05-06 DIAGNOSIS — N39.0 ACUTE UTI: ICD-10-CM

## 2020-05-06 DIAGNOSIS — I48.91 NEW ONSET ATRIAL FIBRILLATION (HCC): Primary | ICD-10-CM

## 2020-05-06 DIAGNOSIS — I50.31 DIASTOLIC CHF, ACUTE (HCC): ICD-10-CM

## 2020-05-06 DIAGNOSIS — R41.82 ALTERED MENTAL STATUS, UNSPECIFIED ALTERED MENTAL STATUS TYPE: ICD-10-CM

## 2020-05-06 DIAGNOSIS — I50.9 ACUTE CONGESTIVE HEART FAILURE, UNSPECIFIED HEART FAILURE TYPE (HCC): ICD-10-CM

## 2020-05-06 DIAGNOSIS — I63.512 ACUTE ISCHEMIC LEFT MCA STROKE (HCC): ICD-10-CM

## 2020-05-06 DIAGNOSIS — F02.80 DEMENTIA DUE TO PARKINSON'S DISEASE WITHOUT BEHAVIORAL DISTURBANCE (HCC): ICD-10-CM

## 2020-05-06 DIAGNOSIS — G20 DEMENTIA DUE TO PARKINSON'S DISEASE WITHOUT BEHAVIORAL DISTURBANCE (HCC): ICD-10-CM

## 2020-05-06 DIAGNOSIS — N18.30 CKD (CHRONIC KIDNEY DISEASE) STAGE 3, GFR 30-59 ML/MIN (HCC): ICD-10-CM

## 2020-05-06 LAB
ALBUMIN SERPL-MCNC: 4 G/DL (ref 3.5–5.2)
ALBUMIN/GLOB SERPL: 1.6 G/DL
ALP SERPL-CCNC: 96 U/L (ref 39–117)
ALT SERPL W P-5'-P-CCNC: 12 U/L (ref 1–33)
ANION GAP SERPL CALCULATED.3IONS-SCNC: 17.4 MMOL/L (ref 5–15)
AST SERPL-CCNC: 60 U/L (ref 1–32)
BASOPHILS # BLD AUTO: 0.02 10*3/MM3 (ref 0–0.2)
BASOPHILS NFR BLD AUTO: 0.1 % (ref 0–1.5)
BILIRUB SERPL-MCNC: 0.6 MG/DL (ref 0.2–1.2)
BILIRUB UR QL STRIP: NEGATIVE
BUN BLD-MCNC: 39 MG/DL (ref 8–23)
BUN/CREAT SERPL: 29.3 (ref 7–25)
CALCIUM SPEC-SCNC: 8.5 MG/DL (ref 8.6–10.5)
CHLORIDE SERPL-SCNC: 87 MMOL/L (ref 98–107)
CLARITY UR: ABNORMAL
CO2 SERPL-SCNC: 22.6 MMOL/L (ref 22–29)
COLOR UR: YELLOW
CREAT BLD-MCNC: 1.33 MG/DL (ref 0.57–1)
DEPRECATED RDW RBC AUTO: 39.1 FL (ref 37–54)
EOSINOPHIL # BLD AUTO: 0.01 10*3/MM3 (ref 0–0.4)
EOSINOPHIL NFR BLD AUTO: 0.1 % (ref 0.3–6.2)
ERYTHROCYTE [DISTWIDTH] IN BLOOD BY AUTOMATED COUNT: 12.3 % (ref 12.3–15.4)
GFR SERPL CREATININE-BSD FRML MDRD: 38 ML/MIN/1.73
GLOBULIN UR ELPH-MCNC: 2.5 GM/DL
GLUCOSE BLD-MCNC: 189 MG/DL (ref 65–99)
GLUCOSE UR STRIP-MCNC: NEGATIVE MG/DL
HCT VFR BLD AUTO: 31.3 % (ref 34–46.6)
HGB BLD-MCNC: 10.7 G/DL (ref 12–15.9)
HGB UR QL STRIP.AUTO: NEGATIVE
IMM GRANULOCYTES # BLD AUTO: 0.06 10*3/MM3 (ref 0–0.05)
IMM GRANULOCYTES NFR BLD AUTO: 0.4 % (ref 0–0.5)
KETONES UR QL STRIP: ABNORMAL
LEUKOCYTE ESTERASE UR QL STRIP.AUTO: ABNORMAL
LYMPHOCYTES # BLD AUTO: 1.99 10*3/MM3 (ref 0.7–3.1)
LYMPHOCYTES NFR BLD AUTO: 14.2 % (ref 19.6–45.3)
MAGNESIUM SERPL-MCNC: 2 MG/DL (ref 1.6–2.4)
MCH RBC QN AUTO: 30.3 PG (ref 26.6–33)
MCHC RBC AUTO-ENTMCNC: 34.2 G/DL (ref 31.5–35.7)
MCV RBC AUTO: 88.7 FL (ref 79–97)
MONOCYTES # BLD AUTO: 1.1 10*3/MM3 (ref 0.1–0.9)
MONOCYTES NFR BLD AUTO: 7.8 % (ref 5–12)
NEUTROPHILS # BLD AUTO: 10.87 10*3/MM3 (ref 1.7–7)
NEUTROPHILS NFR BLD AUTO: 77.4 % (ref 42.7–76)
NITRITE UR QL STRIP: POSITIVE
NRBC BLD AUTO-RTO: 0.1 /100 WBC (ref 0–0.2)
NT-PROBNP SERPL-MCNC: ABNORMAL PG/ML (ref 5–1800)
PH UR STRIP.AUTO: <=5 [PH] (ref 5–8)
PLATELET # BLD AUTO: 227 10*3/MM3 (ref 140–450)
PMV BLD AUTO: 11.4 FL (ref 6–12)
POTASSIUM BLD-SCNC: 4.5 MMOL/L (ref 3.5–5.2)
PROT SERPL-MCNC: 6.5 G/DL (ref 6–8.5)
PROT UR QL STRIP: ABNORMAL
RBC # BLD AUTO: 3.53 10*6/MM3 (ref 3.77–5.28)
SODIUM BLD-SCNC: 127 MMOL/L (ref 136–145)
SP GR UR STRIP: 1.02 (ref 1–1.03)
TROPONIN T SERPL-MCNC: 0.04 NG/ML (ref 0–0.03)
UROBILINOGEN UR QL STRIP: ABNORMAL
WBC NRBC COR # BLD: 14.05 10*3/MM3 (ref 3.4–10.8)

## 2020-05-06 PROCEDURE — 93010 ELECTROCARDIOGRAM REPORT: CPT | Performed by: INTERNAL MEDICINE

## 2020-05-06 PROCEDURE — 83880 ASSAY OF NATRIURETIC PEPTIDE: CPT | Performed by: PHYSICIAN ASSISTANT

## 2020-05-06 PROCEDURE — 71045 X-RAY EXAM CHEST 1 VIEW: CPT

## 2020-05-06 PROCEDURE — 85025 COMPLETE CBC W/AUTO DIFF WBC: CPT | Performed by: PHYSICIAN ASSISTANT

## 2020-05-06 PROCEDURE — 80053 COMPREHEN METABOLIC PANEL: CPT | Performed by: PHYSICIAN ASSISTANT

## 2020-05-06 PROCEDURE — 99285 EMERGENCY DEPT VISIT HI MDM: CPT

## 2020-05-06 PROCEDURE — 83735 ASSAY OF MAGNESIUM: CPT | Performed by: PHYSICIAN ASSISTANT

## 2020-05-06 PROCEDURE — 87086 URINE CULTURE/COLONY COUNT: CPT | Performed by: PHYSICIAN ASSISTANT

## 2020-05-06 PROCEDURE — 93005 ELECTROCARDIOGRAM TRACING: CPT | Performed by: PHYSICIAN ASSISTANT

## 2020-05-06 PROCEDURE — 87186 SC STD MICRODIL/AGAR DIL: CPT | Performed by: PHYSICIAN ASSISTANT

## 2020-05-06 PROCEDURE — 87077 CULTURE AEROBIC IDENTIFY: CPT | Performed by: PHYSICIAN ASSISTANT

## 2020-05-06 PROCEDURE — 84484 ASSAY OF TROPONIN QUANT: CPT | Performed by: PHYSICIAN ASSISTANT

## 2020-05-06 PROCEDURE — P9612 CATHETERIZE FOR URINE SPEC: HCPCS

## 2020-05-06 PROCEDURE — 70450 CT HEAD/BRAIN W/O DYE: CPT

## 2020-05-06 PROCEDURE — 81001 URINALYSIS AUTO W/SCOPE: CPT | Performed by: PHYSICIAN ASSISTANT

## 2020-05-06 RX ORDER — SODIUM CHLORIDE 0.9 % (FLUSH) 0.9 %
10 SYRINGE (ML) INJECTION AS NEEDED
Status: DISCONTINUED | OUTPATIENT
Start: 2020-05-06 | End: 2020-05-13 | Stop reason: HOSPADM

## 2020-05-06 RX ADMIN — SODIUM CHLORIDE, PRESERVATIVE FREE 10 ML: 5 INJECTION INTRAVENOUS at 23:06

## 2020-05-07 ENCOUNTER — APPOINTMENT (OUTPATIENT)
Dept: CARDIOLOGY | Facility: HOSPITAL | Age: 85
End: 2020-05-07

## 2020-05-07 PROBLEM — G20 PARKINSON'S DISEASE DEMENTIA (HCC): Status: ACTIVE | Noted: 2020-05-07

## 2020-05-07 PROBLEM — I48.91 NEW ONSET ATRIAL FIBRILLATION (HCC): Status: ACTIVE | Noted: 2020-05-07

## 2020-05-07 PROBLEM — I50.31 DIASTOLIC CHF, ACUTE (HCC): Status: ACTIVE | Noted: 2020-05-07

## 2020-05-07 PROBLEM — K21.9 GERD (GASTROESOPHAGEAL REFLUX DISEASE): Status: ACTIVE | Noted: 2020-05-07

## 2020-05-07 PROBLEM — E87.1 HYPONATREMIA: Status: ACTIVE | Noted: 2020-05-07

## 2020-05-07 PROBLEM — F02.80 PARKINSON'S DISEASE DEMENTIA (HCC): Status: ACTIVE | Noted: 2020-05-07

## 2020-05-07 LAB
ANION GAP SERPL CALCULATED.3IONS-SCNC: 18.1 MMOL/L (ref 5–15)
AORTIC DIMENSIONLESS INDEX: 0.8 (DI)
BACTERIA UR QL AUTO: ABNORMAL /HPF
BH CV ECHO MEAS - ACS: 1.6 CM
BH CV ECHO MEAS - AO MAX PG (FULL): 2.3 MMHG
BH CV ECHO MEAS - AO MAX PG: 5.8 MMHG
BH CV ECHO MEAS - AO MEAN PG (FULL): 1 MMHG
BH CV ECHO MEAS - AO MEAN PG: 2.7 MMHG
BH CV ECHO MEAS - AO ROOT AREA (BSA CORRECTED): 1.9
BH CV ECHO MEAS - AO ROOT AREA: 7.8 CM^2
BH CV ECHO MEAS - AO ROOT DIAM: 3.1 CM
BH CV ECHO MEAS - AO V2 MAX: 120.8 CM/SEC
BH CV ECHO MEAS - AO V2 MEAN: 75.1 CM/SEC
BH CV ECHO MEAS - AO V2 VTI: 19.4 CM
BH CV ECHO MEAS - AVA(I,A): 2.5 CM^2
BH CV ECHO MEAS - AVA(I,D): 2.5 CM^2
BH CV ECHO MEAS - AVA(V,A): 2.3 CM^2
BH CV ECHO MEAS - AVA(V,D): 2.3 CM^2
BH CV ECHO MEAS - BSA(HAYCOCK): 1.7 M^2
BH CV ECHO MEAS - BSA: 1.7 M^2
BH CV ECHO MEAS - BZI_BMI: 23.3 KILOGRAMS/M^2
BH CV ECHO MEAS - BZI_METRIC_HEIGHT: 165.1 CM
BH CV ECHO MEAS - BZI_METRIC_WEIGHT: 63.5 KG
BH CV ECHO MEAS - EDV(CUBED): 54.7 ML
BH CV ECHO MEAS - EDV(MOD-SP2): 32 ML
BH CV ECHO MEAS - EDV(MOD-SP4): 34 ML
BH CV ECHO MEAS - EDV(TEICH): 61.8 ML
BH CV ECHO MEAS - EF(CUBED): 61 %
BH CV ECHO MEAS - EF(MOD-BP): 56 %
BH CV ECHO MEAS - EF(MOD-SP2): 53.1 %
BH CV ECHO MEAS - EF(MOD-SP4): 55.9 %
BH CV ECHO MEAS - EF(TEICH): 53.3 %
BH CV ECHO MEAS - ESV(CUBED): 21.3 ML
BH CV ECHO MEAS - ESV(MOD-SP2): 15 ML
BH CV ECHO MEAS - ESV(MOD-SP4): 15 ML
BH CV ECHO MEAS - ESV(TEICH): 28.9 ML
BH CV ECHO MEAS - FS: 27 %
BH CV ECHO MEAS - IVS/LVPW: 0.9
BH CV ECHO MEAS - IVSD: 0.96 CM
BH CV ECHO MEAS - LAT PEAK E' VEL: 10.1 CM/SEC
BH CV ECHO MEAS - LV DIASTOLIC VOL/BSA (35-75): 20 ML/M^2
BH CV ECHO MEAS - LV MASS(C)D: 119 GRAMS
BH CV ECHO MEAS - LV MASS(C)DI: 70 GRAMS/M^2
BH CV ECHO MEAS - LV MAX PG: 3.5 MMHG
BH CV ECHO MEAS - LV MEAN PG: 1.7 MMHG
BH CV ECHO MEAS - LV SYSTOLIC VOL/BSA (12-30): 8.8 ML/M^2
BH CV ECHO MEAS - LV V1 MAX: 94.1 CM/SEC
BH CV ECHO MEAS - LV V1 MEAN: 60 CM/SEC
BH CV ECHO MEAS - LV V1 VTI: 16.2 CM
BH CV ECHO MEAS - LVIDD: 3.8 CM
BH CV ECHO MEAS - LVIDS: 2.8 CM
BH CV ECHO MEAS - LVLD AP2: 6.3 CM
BH CV ECHO MEAS - LVLD AP4: 6.4 CM
BH CV ECHO MEAS - LVLS AP2: 6 CM
BH CV ECHO MEAS - LVLS AP4: 6 CM
BH CV ECHO MEAS - LVOT AREA (M): 2.8 CM^2
BH CV ECHO MEAS - LVOT AREA: 3 CM^2
BH CV ECHO MEAS - LVOT DIAM: 1.9 CM
BH CV ECHO MEAS - LVPWD: 1.1 CM
BH CV ECHO MEAS - MED PEAK E' VEL: 8.3 CM/SEC
BH CV ECHO MEAS - MR MAX PG: 108.5 MMHG
BH CV ECHO MEAS - MR MAX VEL: 520.7 CM/SEC
BH CV ECHO MEAS - MV DEC SLOPE: 1079 CM/SEC^2
BH CV ECHO MEAS - MV DEC TIME: 124 SEC
BH CV ECHO MEAS - MV E MAX VEL: 148 CM/SEC
BH CV ECHO MEAS - MV MAX PG: 7.4 MMHG
BH CV ECHO MEAS - MV MEAN PG: 3.7 MMHG
BH CV ECHO MEAS - MV P1/2T MAX VEL: 153 CM/SEC
BH CV ECHO MEAS - MV P1/2T: 41.5 MSEC
BH CV ECHO MEAS - MV V2 MAX: 135.7 CM/SEC
BH CV ECHO MEAS - MV V2 MEAN: 91.2 CM/SEC
BH CV ECHO MEAS - MV V2 VTI: 20 CM
BH CV ECHO MEAS - MVA P1/2T LCG: 1.4 CM^2
BH CV ECHO MEAS - MVA(P1/2T): 5.3 CM^2
BH CV ECHO MEAS - MVA(VTI): 2.4 CM^2
BH CV ECHO MEAS - PA ACC TIME: 0.05 SEC
BH CV ECHO MEAS - PA MAX PG (FULL): 0.33 MMHG
BH CV ECHO MEAS - PA MAX PG: 3.5 MMHG
BH CV ECHO MEAS - PA PR(ACCEL): 57.6 MMHG
BH CV ECHO MEAS - PA V2 MAX: 93.7 CM/SEC
BH CV ECHO MEAS - PI END-D VEL: 148 CM/SEC
BH CV ECHO MEAS - PULM DIAS VEL: 67.9 CM/SEC
BH CV ECHO MEAS - PULM S/D: 0.69
BH CV ECHO MEAS - PULM SYS VEL: 46.6 CM/SEC
BH CV ECHO MEAS - RAP SYSTOLE: 8 MMHG
BH CV ECHO MEAS - RV BASE (<4.1) - OBSOLETE: 2.9 CM
BH CV ECHO MEAS - RV MAX PG: 3.2 MMHG
BH CV ECHO MEAS - RV MEAN PG: 1.6 MMHG
BH CV ECHO MEAS - RV V1 MAX: 89.2 CM/SEC
BH CV ECHO MEAS - RV V1 MEAN: 59.3 CM/SEC
BH CV ECHO MEAS - RV V1 VTI: 16.4 CM
BH CV ECHO MEAS - RVSP: 51 MMHG
BH CV ECHO MEAS - SI(AO): 89.1 ML/M^2
BH CV ECHO MEAS - SI(CUBED): 19.6 ML/M^2
BH CV ECHO MEAS - SI(LVOT): 28 ML/M^2
BH CV ECHO MEAS - SI(MOD-SP2): 10 ML/M^2
BH CV ECHO MEAS - SI(MOD-SP4): 11.2 ML/M^2
BH CV ECHO MEAS - SI(TEICH): 19.4 ML/M^2
BH CV ECHO MEAS - SV(AO): 151.4 ML
BH CV ECHO MEAS - SV(CUBED): 33.4 ML
BH CV ECHO MEAS - SV(LVOT): 47.7 ML
BH CV ECHO MEAS - SV(MOD-SP2): 17 ML
BH CV ECHO MEAS - SV(MOD-SP4): 19 ML
BH CV ECHO MEAS - SV(TEICH): 33 ML
BH CV ECHO MEAS - TAPSE (>1.6): 1.8 CM2
BH CV ECHO MEAS - TR MAX PG: 43 MMHG
BH CV ECHO MEAS - TR MAX VEL: 327.3 CM/SEC
BH CV ECHO MEASUREMENTS AVERAGE E/E' RATIO: 16.09
BH CV XLRA - RV BASE: 2.9 CM
BUN BLD-MCNC: 38 MG/DL (ref 8–23)
BUN/CREAT SERPL: 31.4 (ref 7–25)
CALCIUM SPEC-SCNC: 8.7 MG/DL (ref 8.6–10.5)
CHLORIDE SERPL-SCNC: 87 MMOL/L (ref 98–107)
CO2 SERPL-SCNC: 23.9 MMOL/L (ref 22–29)
CREAT BLD-MCNC: 1.21 MG/DL (ref 0.57–1)
DEPRECATED RDW RBC AUTO: 39.3 FL (ref 37–54)
ERYTHROCYTE [DISTWIDTH] IN BLOOD BY AUTOMATED COUNT: 12.4 % (ref 12.3–15.4)
GFR SERPL CREATININE-BSD FRML MDRD: 42 ML/MIN/1.73
GLUCOSE BLD-MCNC: 142 MG/DL (ref 65–99)
HCT VFR BLD AUTO: 31.4 % (ref 34–46.6)
HGB BLD-MCNC: 10.6 G/DL (ref 12–15.9)
HYALINE CASTS UR QL AUTO: ABNORMAL /LPF
INR PPP: 1.24 (ref 0.9–1.1)
LEFT ATRIUM VOLUME INDEX: 28.4 ML/M2
LYMPHOCYTES # BLD MANUAL: 0.84 10*3/MM3 (ref 0.7–3.1)
LYMPHOCYTES NFR BLD MANUAL: 1 % (ref 5–12)
LYMPHOCYTES NFR BLD MANUAL: 9 % (ref 19.6–45.3)
MAXIMAL PREDICTED HEART RATE: 134 BPM
MCH RBC QN AUTO: 29.5 PG (ref 26.6–33)
MCHC RBC AUTO-ENTMCNC: 33.8 G/DL (ref 31.5–35.7)
MCV RBC AUTO: 87.5 FL (ref 79–97)
MONOCYTES # BLD AUTO: 0.09 10*3/MM3 (ref 0.1–0.9)
NEUTROPHILS # BLD AUTO: 8.42 10*3/MM3 (ref 1.7–7)
NEUTROPHILS NFR BLD MANUAL: 90 % (ref 42.7–76)
PLAT MORPH BLD: NORMAL
PLATELET # BLD AUTO: 209 10*3/MM3 (ref 140–450)
PMV BLD AUTO: 11.1 FL (ref 6–12)
POTASSIUM BLD-SCNC: 3.9 MMOL/L (ref 3.5–5.2)
PROTHROMBIN TIME: 15.3 SECONDS (ref 11.7–14.2)
RBC # BLD AUTO: 3.59 10*6/MM3 (ref 3.77–5.28)
RBC # UR: ABNORMAL /HPF
RBC MORPH BLD: NORMAL
REF LAB TEST METHOD: ABNORMAL
SODIUM BLD-SCNC: 129 MMOL/L (ref 136–145)
SQUAMOUS #/AREA URNS HPF: ABNORMAL /HPF
STRESS TARGET HR: 114 BPM
TROPONIN T SERPL-MCNC: 0.03 NG/ML (ref 0–0.03)
TROPONIN T SERPL-MCNC: 0.03 NG/ML (ref 0–0.03)
WBC MORPH BLD: NORMAL
WBC NRBC COR # BLD: 9.36 10*3/MM3 (ref 3.4–10.8)
WBC UR QL AUTO: ABNORMAL /HPF
YEAST URNS QL MICRO: ABNORMAL /HPF

## 2020-05-07 PROCEDURE — 85007 BL SMEAR W/DIFF WBC COUNT: CPT | Performed by: NURSE PRACTITIONER

## 2020-05-07 PROCEDURE — 93306 TTE W/DOPPLER COMPLETE: CPT | Performed by: INTERNAL MEDICINE

## 2020-05-07 PROCEDURE — 99222 1ST HOSP IP/OBS MODERATE 55: CPT | Performed by: INTERNAL MEDICINE

## 2020-05-07 PROCEDURE — 25010000002 CEFTRIAXONE PER 250 MG: Performed by: PHYSICIAN ASSISTANT

## 2020-05-07 PROCEDURE — 25010000002 FUROSEMIDE PER 20 MG: Performed by: PHYSICIAN ASSISTANT

## 2020-05-07 PROCEDURE — 85027 COMPLETE CBC AUTOMATED: CPT | Performed by: NURSE PRACTITIONER

## 2020-05-07 PROCEDURE — 84484 ASSAY OF TROPONIN QUANT: CPT | Performed by: INTERNAL MEDICINE

## 2020-05-07 PROCEDURE — 84484 ASSAY OF TROPONIN QUANT: CPT | Performed by: PHYSICIAN ASSISTANT

## 2020-05-07 PROCEDURE — 80048 BASIC METABOLIC PNL TOTAL CA: CPT | Performed by: NURSE PRACTITIONER

## 2020-05-07 PROCEDURE — 36415 COLL VENOUS BLD VENIPUNCTURE: CPT | Performed by: NURSE PRACTITIONER

## 2020-05-07 PROCEDURE — 93306 TTE W/DOPPLER COMPLETE: CPT

## 2020-05-07 PROCEDURE — 85610 PROTHROMBIN TIME: CPT | Performed by: NURSE PRACTITIONER

## 2020-05-07 PROCEDURE — 25010000002 FUROSEMIDE PER 20 MG: Performed by: NURSE PRACTITIONER

## 2020-05-07 PROCEDURE — 25010000002 DIGOXIN PER 500 MCG: Performed by: INTERNAL MEDICINE

## 2020-05-07 RX ORDER — ACETAMINOPHEN 325 MG/1
650 TABLET ORAL EVERY 4 HOURS PRN
Status: DISCONTINUED | OUTPATIENT
Start: 2020-05-07 | End: 2020-05-13 | Stop reason: HOSPADM

## 2020-05-07 RX ORDER — ACETAMINOPHEN 650 MG/1
650 SUPPOSITORY RECTAL EVERY 4 HOURS PRN
Status: DISCONTINUED | OUTPATIENT
Start: 2020-05-07 | End: 2020-05-07

## 2020-05-07 RX ORDER — ACETAMINOPHEN 160 MG/5ML
650 SOLUTION ORAL EVERY 4 HOURS PRN
Status: DISCONTINUED | OUTPATIENT
Start: 2020-05-07 | End: 2020-05-07

## 2020-05-07 RX ORDER — CEFTRIAXONE SODIUM 1 G/50ML
1 INJECTION, SOLUTION INTRAVENOUS ONCE
Status: COMPLETED | OUTPATIENT
Start: 2020-05-07 | End: 2020-05-07

## 2020-05-07 RX ORDER — AMOXICILLIN 250 MG
2 CAPSULE ORAL 2 TIMES DAILY
Status: DISCONTINUED | OUTPATIENT
Start: 2020-05-07 | End: 2020-05-13 | Stop reason: HOSPADM

## 2020-05-07 RX ORDER — ATENOLOL 25 MG/1
25 TABLET ORAL
Status: DISCONTINUED | OUTPATIENT
Start: 2020-05-07 | End: 2020-05-08

## 2020-05-07 RX ORDER — GABAPENTIN 100 MG/1
100 CAPSULE ORAL NIGHTLY
Status: DISCONTINUED | OUTPATIENT
Start: 2020-05-07 | End: 2020-05-13 | Stop reason: HOSPADM

## 2020-05-07 RX ORDER — ONDANSETRON 2 MG/ML
4 INJECTION INTRAMUSCULAR; INTRAVENOUS EVERY 6 HOURS PRN
Status: DISCONTINUED | OUTPATIENT
Start: 2020-05-07 | End: 2020-05-13 | Stop reason: HOSPADM

## 2020-05-07 RX ORDER — NITROGLYCERIN 0.4 MG/1
0.4 TABLET SUBLINGUAL
Status: DISCONTINUED | OUTPATIENT
Start: 2020-05-07 | End: 2020-05-13 | Stop reason: HOSPADM

## 2020-05-07 RX ORDER — SODIUM CHLORIDE 0.9 % (FLUSH) 0.9 %
10 SYRINGE (ML) INJECTION AS NEEDED
Status: DISCONTINUED | OUTPATIENT
Start: 2020-05-07 | End: 2020-05-13 | Stop reason: HOSPADM

## 2020-05-07 RX ORDER — FUROSEMIDE 10 MG/ML
40 INJECTION INTRAMUSCULAR; INTRAVENOUS ONCE
Status: COMPLETED | OUTPATIENT
Start: 2020-05-07 | End: 2020-05-07

## 2020-05-07 RX ORDER — SODIUM CHLORIDE 0.9 % (FLUSH) 0.9 %
10 SYRINGE (ML) INJECTION EVERY 12 HOURS SCHEDULED
Status: DISCONTINUED | OUTPATIENT
Start: 2020-05-07 | End: 2020-05-13 | Stop reason: HOSPADM

## 2020-05-07 RX ORDER — ASPIRIN 81 MG/1
81 TABLET, CHEWABLE ORAL DAILY
Status: DISCONTINUED | OUTPATIENT
Start: 2020-05-07 | End: 2020-05-07

## 2020-05-07 RX ORDER — FUROSEMIDE 10 MG/ML
40 INJECTION INTRAMUSCULAR; INTRAVENOUS EVERY 12 HOURS
Status: DISCONTINUED | OUTPATIENT
Start: 2020-05-07 | End: 2020-05-09

## 2020-05-07 RX ORDER — PANTOPRAZOLE SODIUM 40 MG/1
40 TABLET, DELAYED RELEASE ORAL
Status: DISCONTINUED | OUTPATIENT
Start: 2020-05-07 | End: 2020-05-13 | Stop reason: HOSPADM

## 2020-05-07 RX ORDER — PANTOPRAZOLE SODIUM 20 MG/1
20 TABLET, DELAYED RELEASE ORAL DAILY
Status: DISCONTINUED | OUTPATIENT
Start: 2020-05-07 | End: 2020-05-07 | Stop reason: CLARIF

## 2020-05-07 RX ORDER — DIGOXIN 0.25 MG/ML
500 INJECTION INTRAMUSCULAR; INTRAVENOUS ONCE
Status: COMPLETED | OUTPATIENT
Start: 2020-05-07 | End: 2020-05-07

## 2020-05-07 RX ADMIN — FUROSEMIDE 40 MG: 20 INJECTION, SOLUTION INTRAMUSCULAR; INTRAVENOUS at 00:23

## 2020-05-07 RX ADMIN — DOCUSATE SODIUM 50MG AND SENNOSIDES 8.6MG 2 TABLET: 8.6; 5 TABLET, FILM COATED ORAL at 09:03

## 2020-05-07 RX ADMIN — CEFTRIAXONE SODIUM 1 G: 1 INJECTION, SOLUTION INTRAVENOUS at 01:26

## 2020-05-07 RX ADMIN — SODIUM CHLORIDE 5 MG/HR: 900 INJECTION, SOLUTION INTRAVENOUS at 13:48

## 2020-05-07 RX ADMIN — SODIUM CHLORIDE, PRESERVATIVE FREE 10 ML: 5 INJECTION INTRAVENOUS at 20:44

## 2020-05-07 RX ADMIN — APIXABAN 2.5 MG: 2.5 TABLET, FILM COATED ORAL at 15:52

## 2020-05-07 RX ADMIN — GABAPENTIN 100 MG: 100 CAPSULE ORAL at 20:44

## 2020-05-07 RX ADMIN — DOCUSATE SODIUM 50MG AND SENNOSIDES 8.6MG 2 TABLET: 8.6; 5 TABLET, FILM COATED ORAL at 20:45

## 2020-05-07 RX ADMIN — ATENOLOL 25 MG: 25 TABLET ORAL at 15:52

## 2020-05-07 RX ADMIN — CARBIDOPA AND LEVODOPA 1.5 TABLET: 25; 100 TABLET ORAL at 20:45

## 2020-05-07 RX ADMIN — PANTOPRAZOLE SODIUM 40 MG: 40 TABLET, DELAYED RELEASE ORAL at 06:51

## 2020-05-07 RX ADMIN — APIXABAN 2.5 MG: 2.5 TABLET, FILM COATED ORAL at 20:44

## 2020-05-07 RX ADMIN — CARBIDOPA AND LEVODOPA 1.5 TABLET: 25; 100 TABLET ORAL at 15:52

## 2020-05-07 RX ADMIN — FUROSEMIDE 40 MG: 10 INJECTION, SOLUTION INTRAMUSCULAR; INTRAVENOUS at 20:45

## 2020-05-07 RX ADMIN — ASPIRIN 81 MG: 81 TABLET, CHEWABLE ORAL at 09:03

## 2020-05-07 RX ADMIN — CARBIDOPA AND LEVODOPA 2 TABLET: 25; 100 TABLET ORAL at 09:03

## 2020-05-07 RX ADMIN — DIGOXIN 500 MCG: 0.25 INJECTION INTRAMUSCULAR; INTRAVENOUS at 13:49

## 2020-05-07 RX ADMIN — CARBIDOPA AND LEVODOPA 1.5 TABLET: 25; 100 TABLET ORAL at 12:31

## 2020-05-07 RX ADMIN — SODIUM CHLORIDE, PRESERVATIVE FREE 10 ML: 5 INJECTION INTRAVENOUS at 09:03

## 2020-05-07 RX ADMIN — FUROSEMIDE 40 MG: 10 INJECTION, SOLUTION INTRAMUSCULAR; INTRAVENOUS at 09:03

## 2020-05-07 RX ADMIN — SODIUM CHLORIDE, PRESERVATIVE FREE 10 ML: 5 INJECTION INTRAVENOUS at 06:52

## 2020-05-07 NOTE — ED TRIAGE NOTES
Call ahead from ANNMARIE at [facility].  Pt was being treated for possible afib and started on Eliquis as well as metoprolol.  Pt reported SOA and chest pressure later in the evening, sent in for eval. Pt has hx of parkinsons, OA, HTN.

## 2020-05-07 NOTE — PROGRESS NOTES
Patient seen this a.m. by Dr. Sparks  Sodium is improved, creatinine is stable, INR mildly elevated, white count has improved, given a dose of Rocephin, cardiology is seen the patient

## 2020-05-07 NOTE — ED PROVIDER NOTES
EMERGENCY DEPARTMENT ENCOUNTER    Room Number:  08/08  Date of encounter:  5/7/2020  PCP: Rosanne Bland MD  [facility]ian: [facility]      HPI:  Chief Complaint: Shortness of breath, chest pressure    Context: Gregoria Jimenez is a 86 y.o. female who presents to the ED c/o complaining of shortness of breath,chest pressure that began 3 days ago.  She states she first noticed the shortness of breath on Monday while trying to take her evening walk.  She states she was unable to complete her walk due to development of shortness of breath and since that time her symptoms have gradually worsened.  Per the nursing home she was recently diagnosed with atrial fibrillation and placed on metoprolol and Eliquis.  Nursing home also reports she has been a little more altered from her baseline over the past 3 days.  She describes the chest pressure as a 4 out of 10 on the pain scale, nonradiating, and substernal.  She denies nausea, vomiting, headache, diaphoresis associated with this pain.  She also denies abdominal pain and UTI symptoms at this time.      PAST MEDICAL HISTORY  Active Ambulatory Problems     Diagnosis Date Noted   • Osteoarthritis of left knee 02/15/2017   • Osteoarthritis of right knee 05/15/2019     Resolved Ambulatory Problems     Diagnosis Date Noted   • No Resolved Ambulatory Problems     Past Medical History:   Diagnosis Date   • Arthritis    • Constipation    • Eye hemorrhage, right    • GERD (gastroesophageal reflux disease)    • History of bronchiectasis    • Hypertension    • Limited joint range of motion (ROM)    • Parkinson disease (CMS/HCC)    • Vitamin D deficiency          PAST SURGICAL HISTORY  Past Surgical History:   Procedure Laterality Date   • CATARACT EXTRACTION W/ INTRAOCULAR LENS  IMPLANT, BILATERAL     • COLONOSCOPY W/ BIOPSIES AND POLYPECTOMY      BENIGN   • KNEE ARTHROSCOPY     • HI TOTAL KNEE ARTHROPLASTY Left 2/15/2017    Procedure: LT TOTAL KNEE  ARTHROPLASTY;  Surgeon: Gregory Carvajal MD;  Location: [facility];  Service: Orthopedics   • TOTAL KNEE ARTHROPLASTY Right 5/15/2019    Procedure: RIGHT TOTAL KNEE ARTHROPLASTY;  Surgeon: Gregory Carvajal MD;  Location: [facility];  Service: Orthopedics         FAMILY HISTORY  Family History   Problem Relation Age of Onset   • Malig Hyperthermia Neg Hx          SOCIAL HISTORY  Social History     Socioeconomic History   • Marital status: Single     Spouse name: Not on file   • Number of children: Not on file   • Years of education: Not on file   • Highest education level: Not on file   Tobacco Use   • Smoking status: Never Smoker   • Smokeless tobacco: Never Used   Substance and Sexual Activity   • Alcohol use: No   • Drug use: No   • Sexual activity: Defer         ALLERGIES  Sulfa antibiotics        REVIEW OF SYSTEMS  Review of Systems     All systems reviewed and negative except for those discussed in HPI.       PHYSICAL EXAM    I have reviewed the triage vital signs and nursing notes.    ED Triage Vitals [05/06/20 2207]   Temp Heart Rate Resp BP SpO2   97 °F (36.1 °C) 100 18 135/80 94 %      Temp src Heart Rate Source Patient Position BP Location FiO2 (%)   -- -- -- -- --       Physical Exam  GENERAL: Mild distress  HENT: nares patent, mucous membranes normal  EYES: no scleral icterus, conjunctiva normal, PERRL, EOMs intact without nystagmus.  CV: Irregularly irregular slightly tachycardic, no rubs murmurs gallops noted.  There is mild JVD present, there is mild bilateral pedal edema present.  The patient has palpable pedal pulses.  RESPIRATORY: Slightly tachypneic, patient will rales at the base of bilateral lungs  ABDOMEN: soft, nontender, no masses noted  MUSCULOSKELETAL: no deformity  NEURO: alert, moves all extremities, follows commands, cranial nerves II through XII normal as tested, negative Romberg, no limb ataxia noted, cerebellar/finger-to-nose unremarkable, strength 5 of 5 bilateral upper and lower  extremities, light sensation is intact bilateral upper and lower extremities.  SKIN: warm, dry, no skin rash noted.        LAB RESULTS  Recent Results (from the past 24 hour(s))   Comprehensive Metabolic Panel    Collection Time: 05/06/20 11:05 PM   Result Value Ref Range    Glucose 189 (H) 65 - 99 mg/dL    BUN 39 (H) 8 - 23 mg/dL    Creatinine 1.33 (H) 0.57 - 1.00 mg/dL    Sodium 127 (L) 136 - 145 mmol/L    Potassium 4.5 3.5 - 5.2 mmol/L    Chloride 87 (L) 98 - 107 mmol/L    CO2 22.6 22.0 - 29.0 mmol/L    Calcium 8.5 (L) 8.6 - 10.5 mg/dL    Total Protein 6.5 6.0 - 8.5 g/dL    Albumin 4.00 3.50 - 5.20 g/dL    ALT (SGPT) 12 1 - 33 U/L    AST (SGOT) 60 (H) 1 - 32 U/L    Alkaline Phosphatase 96 39 - 117 U/L    Total Bilirubin 0.6 0.2 - 1.2 mg/dL    eGFR Non African Amer 38 (L) >60 mL/min/1.73    Globulin 2.5 gm/dL    A/G Ratio 1.6 g/dL    BUN/Creatinine Ratio 29.3 (H) 7.0 - 25.0    Anion Gap 17.4 (H) 5.0 - 15.0 mmol/L   BNP    Collection Time: 05/06/20 11:05 PM   Result Value Ref Range    proBNP 11,383.0 (H) 5.0-1,800.0 pg/mL   Troponin    Collection Time: 05/06/20 11:05 PM   Result Value Ref Range    Troponin T 0.035 (C) 0.000 - 0.030 ng/mL   Magnesium    Collection Time: 05/06/20 11:05 PM   Result Value Ref Range    Magnesium 2.0 1.6 - 2.4 mg/dL   CBC Auto Differential    Collection Time: 05/06/20 11:05 PM   Result Value Ref Range    WBC 14.05 (H) 3.40 - 10.80 10*3/mm3    RBC 3.53 (L) 3.77 - 5.28 10*6/mm3    Hemoglobin 10.7 (L) 12.0 - 15.9 g/dL    Hematocrit 31.3 (L) 34.0 - 46.6 %    MCV 88.7 79.0 - 97.0 fL    MCH 30.3 26.6 - 33.0 pg    MCHC 34.2 31.5 - 35.7 g/dL    RDW 12.3 12.3 - 15.4 %    RDW-SD 39.1 37.0 - 54.0 fl    MPV 11.4 6.0 - 12.0 fL    Platelets 227 140 - 450 10*3/mm3    Neutrophil % 77.4 (H) 42.7 - 76.0 %    Lymphocyte % 14.2 (L) 19.6 - 45.3 %    Monocyte % 7.8 5.0 - 12.0 %    Eosinophil % 0.1 (L) 0.3 - 6.2 %    Basophil % 0.1 0.0 - 1.5 %    Immature Grans % 0.4 0.0 - 0.5 %    Neutrophils, Absolute 10.87  (H) 1.70 - 7.00 10*3/mm3    Lymphocytes, Absolute 1.99 0.70 - 3.10 10*3/mm3    Monocytes, Absolute 1.10 (H) 0.10 - 0.90 10*3/mm3    Eosinophils, Absolute 0.01 0.00 - 0.40 10*3/mm3    Basophils, Absolute 0.02 0.00 - 0.20 10*3/mm3    Immature Grans, Absolute 0.06 (H) 0.00 - 0.05 10*3/mm3    nRBC 0.1 0.0 - 0.2 /100 WBC   Urinalysis With Microscopic If Indicated (No Culture) - Urine, Catheter    Collection Time: 05/06/20 11:28 PM   Result Value Ref Range    Color, UA Yellow Yellow, Straw    Appearance, UA Cloudy (A) Clear    pH, UA <=5.0 5.0 - 8.0    Specific Gravity, UA 1.022 1.005 - 1.030    Glucose, UA Negative Negative    Ketones, UA Trace (A) Negative    Bilirubin, UA Negative Negative    Blood, UA Negative Negative    Protein, UA 30 mg/dL (1+) (A) Negative    Leuk Esterase, UA Moderate (2+) (A) Negative    Nitrite, UA Positive (A) Negative    Urobilinogen, UA 1.0 E.U./dL 0.2 - 1.0 E.U./dL   Urinalysis, Microscopic Only - Urine, Catheter    Collection Time: 05/06/20 11:28 PM   Result Value Ref Range    RBC, UA 0-2 None Seen, 0-2 /HPF    WBC, UA 31-50 (A) None Seen, 0-2 /HPF    Bacteria, UA 1+ (A) None Seen /HPF    Squamous Epithelial Cells, UA 0-2 None Seen, 0-2 /HPF    Yeast, UA Small/1+ Yeast None Seen /HPF    Hyaline Casts, UA 3-6 None Seen /LPF    Methodology Manual Light Microscopy    Troponin    Collection Time: 05/07/20 12:26 AM   Result Value Ref Range    Troponin T 0.031 (C) 0.000 - 0.030 ng/mL       Ordered the above labs and independently reviewed the results.        RADIOLOGY  Ct Head Without Contrast    Result Date: 5/6/2020  CT HEAD WITHOUT CONTRAST  HISTORY: Altered mental status. Patient is on blood thinners.  COMPARISON: None available.  TECHNIQUE: Axial CT imaging was obtained from vertex of the skull through the skull base. No IV contrast was administered.  FINDINGS: No acute intracranial hemorrhage is seen. There is diffuse atrophy. There is no midline shift or mass effect. There is  periventricular and deep white matter microangiopathic change. Mucous retention cyst is seen within the right maxillary sinus.      No acute findings.  Radiation dose reduction techniques were utilized, including automated exposure control and exposure modulation based on body size.  This report was finalized on 5/6/2020 11:51 PM by Dr. Rach Frazier M.D.      Xr Chest 1 View    Result Date: 5/6/2020  PORTABLE CHEST RADIOGRAPH  HISTORY: Shortness of air and chest pain  COMPARISON: 02/03/2017  FINDINGS: Cardiomegaly is identified. There is diffuse interstitial prominence, concerning for vascular congestion. There are bilateral pleural effusions. Bibasilar consolidation is seen, which may represent atelectasis. However, correlation with any symptoms of pneumonia is suggested. No pneumothorax is seen.       1. Cardiomegaly and vascular congestion and bilateral pleural effusions. 2. Bibasilar consolidation may represent atelectasis. Correlation with any symptoms of pneumonia is recommended, however.  This report was finalized on 5/6/2020 11:58 PM by Dr. Rach Frazier M.D.        I ordered the above noted radiological studies. Reviewed by me and discussed with radiologist.  See dictation for official radiology interpretation.      PROCEDURES    Procedures      MEDICATIONS GIVEN IN ER    Medications   sodium chloride 0.9 % flush 10 mL (10 mL Intravenous Given 5/6/20 2306)   cefTRIAXone (ROCEPHIN) IVPB 1 g (has no administration in time range)   sodium chloride 0.9 % flush 10 mL (has no administration in time range)   sodium chloride 0.9 % flush 10 mL (has no administration in time range)   nitroglycerin (NITROSTAT) SL tablet 0.4 mg (has no administration in time range)   acetaminophen (TYLENOL) tablet 650 mg (has no administration in time range)     Or   acetaminophen (TYLENOL) 160 MG/5ML solution 650 mg (has no administration in time range)     Or   acetaminophen (TYLENOL) suppository 650 mg (has no  administration in time range)   ondansetron (ZOFRAN) injection 4 mg (has no administration in time range)   furosemide (LASIX) injection 40 mg (has no administration in time range)   furosemide (LASIX) injection 40 mg (40 mg Intravenous Given 5/7/20 0023)         PROGRESS, DATA ANALYSIS, CONSULTS, AND MEDICAL DECISION MAKING    All labs have been independently reviewed by me.  All radiology studies have been reviewed by me and discussed with radiologist dictating the report.   EKG's independently viewed and interpreted by me.  Discussion below represents my analysis of pertinent findings related to patient's condition, differential diagnosis, treatment plan and final disposition.    Differential diagnosis for the shortness of breath: ACS with CHF, A. fib with CHF, pneumonia, viral bronchitis, undiagnosed COPD.    Differential diagnosis for the altered mental status: ACS, CHF with secondary acidosis, acute UTI, sepsis, metabolic encephalopathy, hepatic encephalopathy, hypertensive encephalopathy, ICH.    After work-up and evaluation in the emergency department the working diagnosis CHF with hypoxia, elevated troponin secondary to fluid overload, and acute UTI.  Discussed these findings with my supervising physician Dr. Garcia who agrees with admission for further care at this time.  Call will be placed [facility] and will initiate treatment in the ER with 40 mg of Lasix IV and 1 g of Rocephin IV.  Urine has been cultured at the time of admission.    1232:  Call placed to [facility]    0100: Discussed with ANNMARIE Doss on call with [facility] to place patient in a telemetry bed under Dr. Sparks's care.    ED Course as of May 07 0107   Wed May 06, 2020   2302 Patient's medical history in [facility] is limited.  There have been a few visits over the past 5 years all related to her conditions disease, osteoarthritis of the knees, total arthroplasty of the knee, and syncopal episodes.  Patient was recently diagnosed with atrial fibrillation  and is being treated with metoprolol and Eliquis.    [RC]   Thu May 07, 2020   0018 EKG independently viewed and contemporaneously interpreted by ED physician. Time: 2319.  Rate 81.  Interpretation: Atrial fibrillation, no RVR, normal axis, normal QRS, no ST elevation or depression, T wave inversion in V1 and V2.    [JG]      ED Course User Index  [JG] Tad Garcia MD  [RC] Mt Osman III, PA           Prior to seeing patient I performed extensive hand washing, saw to it that the patient was wearing a face mask.  Before entering into the room I worse gloves, glasses, and a face mask.  Prior to leaving the room I doffed my gear and performed handwashing.    AS OF 01:07 VITALS:    BP - 163/99  HR - 81  TEMP - 97 °F (36.1 °C)  O2 SATS - 92%        DIAGNOSIS  Final diagnoses:   New onset atrial fibrillation (CMS/HCC)   Acute congestive heart failure, unspecified heart failure type (CMS/HCC)   Acute UTI   Altered mental status, unspecified altered mental status type (Nursing home reported)         DISPOSITION  ADMISSION    Discussed treatment plan and reason for admission with pt/family and admitting physician.  Pt/family voiced understanding of the plan for admission for further testing/treatment as needed.              Mt Osman III, PA  05/07/20 0100

## 2020-05-07 NOTE — ED PROVIDER NOTES
MD ATTESTATION NOTE    The TONE and I have discussed this patient's history, physical exam, and treatment plan. I have reviewed the documentation and personally had a face to face interaction with the patient. I affirm the TONE documentation and agree with their diagnostics, findings, treatment, plan, and disposition.  The attached note describes my personal findings.    Gregoria Jimenez is a 86 y.o. female who presents to the ED c/o shortness of breath since Monday.  Shortness of breath has been progressive, initially just with exertion, now at rest.  Patient denies any orthopnea or paroxysmal nocturnal dyspnea.  Patient denies any swelling of her lower extremities.  Patient also complains of constant chest pressure that has been there for the last 3 days, nonradiating, well localized, not exertional.    On exam:  General: NAD  Head: NCAT  ENT: Extraocular motion intact, pupils equal and round reactive to light, moist mucous membranes  Neck: Supple, trachea midline  Cardiac, regular rate and rhythm, positive JVD, no peripheral edema  Lungs: Crackles in bilateral lungs  Abdomen: Soft, nontender, no rebound tenderness/guarding/rigidity  Extremities: Moves all extremities well, no peripheral edema  Skin: Warm, dry    Medical Decision Making:  Face mask and gloves were worn throughout the patient encounter, unless additional PPE was worn and specified below. Hand hygiene was performed before entering and after leaving the patient room.      ED Course as of May 07 0137   Wed May 06, 2020   2302 Patient's medical history in [facility] is limited.  There have been a few visits over the past 5 years all related to her conditions disease, osteoarthritis of the knees, total arthroplasty of the knee, and syncopal episodes.  Patient was recently diagnosed with atrial fibrillation and is being treated with metoprolol and Eliquis.    []   Thu May 07, 2020   0018 EKG independently viewed and contemporaneously interpreted by ED physician.  Time: 2319.  Rate 81.  Interpretation: Atrial fibrillation, no RVR, normal axis, normal QRS, no ST elevation or depression, T wave inversion in V1 and V2.    [JG]   0030 Troponin is minimally elevated, EKG shows no acute findings, BNP is elevated with chest x-ray showing findings of acute CHF.  Troponin elevation not consistent with ACS, likely secondary to acute CHF    [JG]      ED Course User Index  [JG] Tad Garcia MD  [RC] Mt Osman III, PA       Diagnosis  Final diagnoses:   New onset atrial fibrillation (CMS/HCC)   Acute congestive heart failure, unspecified heart failure type (CMS/HCC)   Acute UTI   Altered mental status, unspecified altered mental status type (Nursing home reported)        Tad Garcia MD  05/07/20 6049

## 2020-05-07 NOTE — ED NOTES
Nursing report ED to floor  Gregoria Jimenez  86 y.o.  female    HPI (triage note):   Chief Complaint   Patient presents with   • Altered Mental Status   • Atrial Fibrillation   • Rapid Heart Rate       Admitting doctor:   Naun Sparks MD    Admitting diagnosis:   The primary encounter diagnosis was New onset atrial fibrillation (CMS/HCC). Diagnoses of Acute congestive heart failure, unspecified heart failure type (CMS/HCC), Acute UTI, and Altered mental status, unspecified altered mental status type (Nursing home reported) were also pertinent to this visit.    Code status:   Current Code Status     Date Active Code Status Order ID Comments User Context       5/7/2020 0104 CPR [id number]  Winter Fowler, APRN ED       Questions for Current Code Status     Question Answer Comment    Code Status CPR     Medical Interventions (Level of Support Prior to Arrest) Full           Allergies:   Sulfa antibiotics    Weight:       05/06/20  2212   Weight: 64.2 kg (141 lb 8.6 oz)       Most recent vitals:   Vitals:    05/06/20 2339 05/07/20 0000 05/07/20 0100 05/07/20 0127   BP:  163/99 147/98    Pulse: 87 81 88 100   Resp:       Temp:       SpO2: 96% 92% 95% 94%   Weight:       Height:           Active LDAs/IV Access:   Lines, Drains & Airways    Active LDAs     Name:   Placement date:   Placement time:   Site:   Days:    Peripheral IV 05/06/20 2203 Left Antecubital   05/06/20 2203    Antecubital   less than 1    Urethral Catheter Double-lumen 18 Fr.   05/17/19    0707     355                Labs (abnormal labs have a star):   Labs Reviewed   COMPREHENSIVE METABOLIC PANEL - Abnormal; Notable for the following components:       Result Value    Glucose 189 (*)     BUN 39 (*)     Creatinine 1.33 (*)     Sodium 127 (*)     Chloride 87 (*)     Calcium 8.5 (*)     AST (SGOT) 60 (*)     eGFR Non African Amer 38 (*)     BUN/Creatinine Ratio 29.3 (*)     Anion Gap 17.4 (*)     All other components within normal limits    Narrative:      GFR Normal >60  Chronic Kidney Disease <60  Kidney Failure <15     BNP (IN-HOUSE) - Abnormal; Notable for the following components:    proBNP 11,383.0 (*)     All other components within normal limits    Narrative:     Among patients with dyspnea, NT-proBNP is highly sensitive for the detection of acute congestive heart failure. In addition NT-proBNP of <300 pg/ml effectively rules out acute congestive heart failure with 99% negative predictive value.    Results may be falsely decreased if patient taking Biotin.     TROPONIN (IN-HOUSE) - Abnormal; Notable for the following components:    Troponin T 0.035 (*)     All other components within normal limits    Narrative:     Troponin T Reference Range:  <= 0.03 ng/mL-   Negative for AMI  >0.03 ng/mL-     Abnormal for myocardial necrosis.  Clinicians would have to utilize clinical acumen, EKG, Troponin and serial changes to determine if it is an Acute Myocardial Infarction or myocardial injury due to an underlying chronic condition.       Results may be falsely decreased if patient taking Biotin.     URINALYSIS W/ MICROSCOPIC IF INDICATED (NO CULTURE) - Abnormal; Notable for the following components:    Appearance, UA Cloudy (*)     Ketones, UA Trace (*)     Protein, UA 30 mg/dL (1+) (*)     Leuk Esterase, UA Moderate (2+) (*)     Nitrite, UA Positive (*)     All other components within normal limits   CBC WITH AUTO DIFFERENTIAL - Abnormal; Notable for the following components:    WBC 14.05 (*)     RBC 3.53 (*)     Hemoglobin 10.7 (*)     Hematocrit 31.3 (*)     Neutrophil % 77.4 (*)     Lymphocyte % 14.2 (*)     Eosinophil % 0.1 (*)     Neutrophils, Absolute 10.87 (*)     Monocytes, Absolute 1.10 (*)     Immature Grans, Absolute 0.06 (*)     All other components within normal limits   URINALYSIS, MICROSCOPIC ONLY - Abnormal; Notable for the following components:    WBC, UA 31-50 (*)     Bacteria, UA 1+ (*)     All other components within normal limits   TROPONIN  (IN-HOUSE) - Abnormal; Notable for the following components:    Troponin T 0.031 (*)     All other components within normal limits    Narrative:     Troponin T Reference Range:  <= 0.03 ng/mL-   Negative for AMI  >0.03 ng/mL-     Abnormal for myocardial necrosis.  Clinicians would have to utilize clinical acumen, EKG, Troponin and serial changes to determine if it is an Acute Myocardial Infarction or myocardial injury due to an underlying chronic condition.       Results may be falsely decreased if patient taking Biotin.     MAGNESIUM - Normal   URINE CULTURE   BASIC METABOLIC PANEL   PROTIME-INR   MANUAL DIFFERENTIAL   CBC WITH AUTO DIFFERENTIAL   CBC AND DIFFERENTIAL    Narrative:     The following orders were created for panel order CBC & Differential.  Procedure                               Abnormality         Status                     ---------                               -----------         ------                     CBC Auto Differential[[id number]]        Abnormal            Final result                 Please view results for these tests on the individual orders.   CBC AND DIFFERENTIAL    Narrative:     The following orders were created for panel order CBC & Differential.  Procedure                               Abnormality         Status                     ---------                               -----------         ------                     Manual Differential[[id number]]                                                         CBC Auto Differential[[id number]]                                                         Please view results for these tests on the individual orders.       EKG:   ECG 12 Lead   Preliminary Result   HEART RATE= 81  bpm   RR Interval= 767  ms   NC Interval=   ms   P Horizontal Axis=   deg   P Front Axis=   deg   QRSD Interval= 91  ms   QT Interval= 429  ms   QRS Axis= 38  deg   T Wave Axis= 38  deg   - ABNORMAL ECG -   Atrial fibrillation   Minimal ST depression, lateral leads    Borderline prolonged QT interval   Electronically Signed By:    Date and Time of Study: 2020-05-06 23:19:24          Meds given in ED:   Medications   sodium chloride 0.9 % flush 10 mL (10 mL Intravenous Given 5/6/20 2306)   cefTRIAXone (ROCEPHIN) IVPB 1 g (1 g Intravenous New Bag 5/7/20 0126)   sodium chloride 0.9 % flush 10 mL (has no administration in time range)   sodium chloride 0.9 % flush 10 mL (has no administration in time range)   nitroglycerin (NITROSTAT) SL tablet 0.4 mg (has no administration in time range)   acetaminophen (TYLENOL) tablet 650 mg (has no administration in time range)     Or   acetaminophen (TYLENOL) 160 MG/5ML solution 650 mg (has no administration in time range)     Or   acetaminophen (TYLENOL) suppository 650 mg (has no administration in time range)   ondansetron (ZOFRAN) injection 4 mg (has no administration in time range)   furosemide (LASIX) injection 40 mg (has no administration in time range)   furosemide (LASIX) injection 40 mg (40 mg Intravenous Given 5/7/20 0023)       Imaging results:  Ct Head Without Contrast    Result Date: 5/6/2020  No acute findings.  Radiation dose reduction techniques were utilized, including automated exposure control and exposure modulation based on body size.  This report was finalized on 5/6/2020 11:51 PM by Dr. Rach Frazier M.D.      Xr Chest 1 View    Result Date: 5/6/2020   1. Cardiomegaly and vascular congestion and bilateral pleural effusions. 2. Bibasilar consolidation may represent atelectasis. Correlation with any symptoms of pneumonia is recommended, however.  This report was finalized on 5/6/2020 11:58 PM by Dr. Rach Frazier M.D.        Ambulatory status:   - assist    Social issues:   Social History     Socioeconomic History   • Marital status: Single     Spouse name: Not on file   • Number of children: Not on file   • Years of education: Not on file   • Highest education level: Not on file   Tobacco Use   • Smoking status:  Never Smoker   • Smokeless tobacco: Never Used   Substance and Sexual Activity   • Alcohol use: No   • Drug use: No   • Sexual activity: Jimena Del Angel RN  05/07/20 0132

## 2020-05-07 NOTE — CONSULTS
Date of Consultation: 20    Referral Provider: Naun Sparks MD     Reason for Consultation: Rapid atrial fibrillation, shortness of breath    Encounter Provider: Main Lawrence MD    Group of Service: [facility]     Patient Name: Gregoria Jimenez    :1934    Chief complaint: Shortness of breath    History of Present Illness:  Ms Jimenez is a 86 year old female patient with history of Parkinson's and GERD. She was admitted to [facility] in 2017 and seen by Dr. Benitez after presenting with atypical chest pain without myocardial infarction. She had a normal stress test (other than mild transient ischemic dilation) completed at that time, with echocardiogram arrythymianormal LVSF with EF 65-70% and no significant valvular disease. She also reported a brief loss of consciousness without documented arrhthymias.     She presented to [facility] ED 20 from her nursing facility with reports of shortness of breath and chest pressure, ongoing for the past 3 days.  She had been having difficulty with her normal activities of daily living, as well as brief walks.  She would often have to stop and catch her breath.  She also evidently recently was diagnosed with atrial fibrillation, and she was started on metoprolol and Eliquis.  In the emergency department, she was felt to be in congestive heart failure.  Her chest x-ray showed cardiomegaly, vascular congestion, and bilateral pleural effusions.  Her troponin was minimally elevated at 0.035, and this has stayed fairly consistent over 3 sets.  An echocardiogram was obtained after admission which showed an ejection fraction of 60 to 65%, mild prolapse of the anterior mitral valve leaflet with moderate to severe mitral regurgitation, and mild to moderate tricuspid regurgitation.  She has been diuresed with IV Lasix, and does feel better.  However, her rate remains intermittently rapid, with rates ranging from 110-150 while I was  in the room.  She does not have palpitations, and was asymptomatic with regards to the atrial fibrillation.  She denied any chest pain.     CXR 5/6/20  1. Cardiomegaly and vascular congestion and bilateral pleural effusions.  2. Bibasilar consolidation may represent atelectasis. Correlation with  any symptoms of pneumonia is recommended, however.      Previous Cardiac Testing:    Stress Test 9/3/17  IMPRESSION:   1. Probable abnormal IV Lexiscan tomographic Cardiolite imaging.   2. LV perfusion shows no clear evidence for infarction or ischemia;   however, there is a transient ischemic dilation which the computer   calculated as 1.35.   3. Gated images show normal wall motion and thickening.  4. Post-stress resting ejection fraction: Gated analysis demonstrates a   post-stress resting ejection fraction of 65%.  5. The test was called abnormal because of the transient ischemic   dilation.     Echocardiogram 9/1/17  Summary   The estimated ejection fraction is 65-70% with no regional wall motion   abnormalities.   There is borderline concentric left ventricular hypertrophy.   There are borderline criteria for LV diastolic dysfunction.   Mild bi-atrial enlargement is visualized.   No significant valvular abnormalities are identified.   There is no evidence of a pericardial effusion.      Past Medical History:   Diagnosis Date   • Arthritis    • Constipation    • Eye hemorrhage, right     BLOOD SHOT RIGHT EYE-05- EYE EXAM-DR. ROSALES INFORMED NO SIG OF INFECTION-PT MOST LIKELY HAPPENED FRO A SNEEZE OR COUGH-BLOOD VESSELS BROKEN   • GERD (gastroesophageal reflux disease)    • History of bronchiectasis     2015   • Hypertension    • Limited joint range of motion (ROM)     RIGHT KNEE   • Parkinson disease (CMS/HCC)    • Vitamin D deficiency          Past Surgical History:   Procedure Laterality Date   • CATARACT EXTRACTION W/ INTRAOCULAR LENS  IMPLANT, BILATERAL     • COLONOSCOPY W/ BIOPSIES AND POLYPECTOMY      BENIGN    • KNEE ARTHROSCOPY     • FL TOTAL KNEE ARTHROPLASTY Left 2/15/2017    Procedure: LT TOTAL KNEE ARTHROPLASTY;  Surgeon: Gregory Carvajal MD;  Location: [facility] MAIN OR;  Service: Orthopedics   • TOTAL KNEE ARTHROPLASTY Right 5/15/2019    Procedure: RIGHT TOTAL KNEE ARTHROPLASTY;  Surgeon: Gregory Carvajal MD;  Location: [facility] MAIN OR;  Service: Orthopedics         Allergies   Allergen Reactions   • Sulfa Antibiotics Nausea Only         No current facility-administered medications on file prior to encounter.      Current Outpatient Medications on File Prior to Encounter   Medication Sig Dispense Refill   • acetaminophen (TYLENOL) 325 MG tablet Take 1-2 tablets by mouth Every 4 (Four) Hours As Needed for moderate pain (4-6). 1 bottle 1   • aspirin 81 MG chewable tablet Chew 81 mg Daily. HOLD PRIOR TO SURGERY     • bisoprolol-hydrochlorothiazide (ZIAC) 5-6.25 MG per tablet Take 1 tablet by mouth Every Morning.     • carbidopa-levodopa (SINEMET)  MG per tablet Take 1.5 tablets by mouth 3 (Three) Times a Day.     • carbidopa-levodopa (SINEMET)  MG per tablet Take 2 tablets by mouth Every Morning.     • Cholecalciferol (VITAMIN D3) 57935 units tablet Take 1 tablet by mouth Every 14 (Fourteen) Days.     • gabapentin (NEURONTIN) 100 MG capsule Take 100 mg by mouth Every Night.     • meloxicam (MOBIC) 15 MG tablet Take 15 mg by mouth Daily. HOLD PRIOR TO SURGERY     • pantoprazole (PROTONIX) 20 MG EC tablet Take 20 mg by mouth Daily.     • sennosides-docusate sodium (SENOKOT-S) 8.6-50 MG tablet Take 2 tablets by mouth 2 (Two) Times a Day. 50 tablet 1         Social History     Socioeconomic History   • Marital status: Single     Spouse name: Not on file   • Number of children: Not on file   • Years of education: Not on file   • Highest education level: Not on file   Tobacco Use   • Smoking status: Never Smoker   • Smokeless tobacco: Never Used   Substance and Sexual Activity   • Alcohol use: No   • Drug use: No   •  "Sexual activity: Defer         Family History   Problem Relation Age of Onset   • Malig Hyperthermia Neg Hx        REVIEW OF SYSTEMS:   Pertinent positives were noted in the HPI above.  Otherwise, all other systems were reviewed, and are negative.     Objective:     Vitals:    05/07/20 0553 05/07/20 0748 05/07/20 1012 05/07/20 1300   BP:  147/88 147/88 145/72   BP Location:  Left arm  Left arm   Patient Position:  Lying  Lying   Pulse:  (!) 122 (!) 122 (!) 123   Resp:  20  20   Temp:  98.3 °F (36.8 °C)  97.5 °F (36.4 °C)   TempSrc:  Oral  Oral   SpO2:  98%  100%   Weight: 63.8 kg (140 lb 11.2 oz)  63.5 kg (140 lb)    Height:   165.1 cm (65\")      Body mass index is 23.3 kg/m².  Flowsheet Rows      First Filed Value   Admission Height  165.1 cm (65\") Documented at 05/06/2020 2212   Admission Weight  64.2 kg (141 lb 8.6 oz) Documented at 05/06/2020 2212           General:    No acute distress, alert and oriented x4, pleasant                   Head:    Normocephalic, atraumatic.   Eyes:          Conjunctivae and sclerae normal, no icterus, PERRLA   Throat:   No oral lesions, no thrush, oral mucosa moist.    Neck:   Supple, trachea midline.   Lungs:     Decreased breath sounds and faint rales at the bases bilaterally    Heart:    Irregularly irregular rhythm with a tachycardic rate.  There was a 2 out of   6 systolic murmur throughout the precordium.   Abdomen:     Soft, non-tender, non-distended, positive bowel sounds.    Extremities:  Trace edema.     Pulses:   Pulses palpable and equal bilaterally.    Skin:   No bleeding or rash.   Neuro:   Non-focal.  Moves all extremities well.    Psychiatric:   Normal mood and affect.         Lab Review:                Results from last 7 days   Lab Units 05/07/20  0324   SODIUM mmol/L 129*   POTASSIUM mmol/L 3.9   CHLORIDE mmol/L 87*   CO2 mmol/L 23.9   BUN mg/dL 38*   CREATININE mg/dL 1.21*   GLUCOSE mg/dL 142*   CALCIUM mg/dL 8.7     Results from last 7 days   Lab Units " 05/07/20  0324 05/07/20  0026 05/06/20  2305   TROPONIN T ng/mL 0.030 0.031* 0.035*     Results from last 7 days   Lab Units 05/07/20  0324   WBC 10*3/mm3 9.36   HEMOGLOBIN g/dL 10.6*   HEMATOCRIT % 31.4*   PLATELETS 10*3/mm3 209     Results from last 7 days   Lab Units 05/07/20  0324   INR  1.24*         Results from last 7 days   Lab Units 05/06/20  2305   MAGNESIUM mg/dL 2.0           EKG (reviewed by me personally):    EKG 5/6/20    Previous EKG 5/7/19      Assessment:   1.  Persistent atrial fibrillation with rapid ventricular response, recently diagnosed  2.  Acute diastolic CHF  3.  Moderate to severe mitral regurgitation  4.  Elevated troponin, likely related to CHF and nonischemic  5.  Chronic kidney disease  6.  Hyponatremia  7.  Anemia (chronic)  8.  Parkinson's disease    Plan:       The patient recently was diagnosed with atrial fibrillation, and was placed on metoprolol and Eliquis.  However, she has been rapid mainly since arrival, but she had rates up to the 150s while I was in the room with her.  At this point, I feel that she needs better rate control, as this is likely driving the congestive heart failure.  I am going to start her on a diltiazem drip.  I am also going to change the metoprolol to atenolol 25 mg/day.  Given that atenolol is more renally metabolized, I will wait to increase this dose until I see how her kidney function is doing.  I will also give her 500 mcg of IV digoxin 1 time.  I feel that with better rate control, she will do much better.    She is being diuresed with Lasix 40 mg IV twice daily, which I agree with.  Her sodium actually went up from 127-129.  Her ejection fraction was normal.  However, she did have moderate to severe mitral regurgitation.  This may improve with better rate control as well.  She did have anterior mitral valve prolapse, and this will need to be watched in the future.  However, I think that she is a very poor candidate for any type of surgery.  If  absolutely necessary, she could be considered for MitraClip down the road.  I resumed her Eliquis at 2.5 mg twice a day, based on her age and her renal function.  I discontinued her aspirin.    Thank you very much for this consult.    Jonnathan Lawrence MD

## 2020-05-07 NOTE — H&P
Patient Name:  Gregoria Jimenez  YOB: 1934  MRN:  [id number]  Admit Date:  5/6/2020  Patient Care Team:  Rosanne Bland MD as PCP - General (Geriatric Medicine)      Subjective   History Present Illness     Chief Complaint   Patient presents with   • Altered Mental Status   • Atrial Fibrillation   • Rapid Heart Rate     History of Present Illness   Mrs. Jimenez is a 86-year-old female with history of Parkinson's disease and GERD who presents to the emergency room with shortness of breath and chest tightness.  Patient does live in assisted living facility for the last 4 to 5 days, she is unable to walk to the dining room back without getting short of breath and having to stop 3 or 4 times.  She did see the nurse practitioner today and was newly diagnosed with A. fib and started on Eliquis and metoprolol, she took her first doses today.  Later this evening she became more more short of breath with some chest pressure.  She denies any fever, cough, any exposure to anyone who is been ill.  She has no significant cardiac history.  There was some note from assisted living that she had some altered mental status, but it is something alert oriented x4 seems to be a good historian.  She denies any recent swelling in her legs.  She denies any burning with urination or frequency, she actually states she has not been urinating as much as normal.  In the emergency room patient was found to have troponin of 0.035, BNP 11,000 383, glucose 189, sodium 127,, creatinine 1.33, white blood cell count 14.05, hemoglobin 10.7, hematocrit 31.3.  Urinalysis, 31-50 white blood cell, patient was given Rocephin in the emergency room, patient is asymptomatic.  Patient was also given 40 mg IV Lasix.    Review of Systems   Constitutional: Negative for appetite change and fever.   HENT: Negative for nosebleeds and trouble swallowing.    Eyes: Negative for photophobia, redness and visual disturbance.   Respiratory:  "Positive for chest tightness and shortness of breath. Negative for cough and wheezing.         Orthopnea   Cardiovascular: Positive for chest pain (\"pressure\" with exertion). Negative for palpitations and leg swelling.   Gastrointestinal: Negative for abdominal distention, abdominal pain, nausea and vomiting.   Endocrine: Negative.    Genitourinary: Negative.    Musculoskeletal: Negative for gait problem and joint swelling.   Skin: Negative.    Neurological: Negative for dizziness, seizures, speech difficulty, light-headedness and headaches.   Hematological: Negative.    Psychiatric/Behavioral: Negative for behavioral problems and confusion.        Personal History     Past Medical History:   Diagnosis Date   • Arthritis    • Constipation    • Eye hemorrhage, right     BLOOD SHOT RIGHT EYE-05- EYE EXAM-DR. ROSALES INFORMED NO SIG OF INFECTION-PT MOST LIKELY HAPPENED FRO A SNEEZE OR COUGH-BLOOD VESSELS BROKEN   • GERD (gastroesophageal reflux disease)    • History of bronchiectasis     2015   • Hypertension    • Limited joint range of motion (ROM)     RIGHT KNEE   • Parkinson disease (CMS/HCC)    • Vitamin D deficiency      Past Surgical History:   Procedure Laterality Date   • CATARACT EXTRACTION W/ INTRAOCULAR LENS  IMPLANT, BILATERAL     • COLONOSCOPY W/ BIOPSIES AND POLYPECTOMY      BENIGN   • KNEE ARTHROSCOPY     • TX TOTAL KNEE ARTHROPLASTY Left 2/15/2017    Procedure: LT TOTAL KNEE ARTHROPLASTY;  Surgeon: Gregory Carvajal MD;  Location: [facility] OR;  Service: Orthopedics   • TOTAL KNEE ARTHROPLASTY Right 5/15/2019    Procedure: RIGHT TOTAL KNEE ARTHROPLASTY;  Surgeon: Gregory Carvajal MD;  Location: [facility];  Service: Orthopedics     Family History   Problem Relation Age of Onset   • Malig Hyperthermia Neg Hx      Social History     Tobacco Use   • Smoking status: Never Smoker   • Smokeless tobacco: Never Used   Substance Use Topics   • Alcohol use: No   • Drug use: No     No current " facility-administered medications on file prior to encounter.      Current Outpatient Medications on File Prior to Encounter   Medication Sig Dispense Refill   • acetaminophen (TYLENOL) 325 MG tablet Take 1-2 tablets by mouth Every 4 (Four) Hours As Needed for moderate pain (4-6). 1 bottle 1   • aspirin 81 MG chewable tablet Chew 81 mg Daily. HOLD PRIOR TO SURGERY     • bisoprolol-hydrochlorothiazide (ZIAC) 5-6.25 MG per tablet Take 1 tablet by mouth Every Morning.     • carbidopa-levodopa (SINEMET)  MG per tablet Take 1.5 tablets by mouth 3 (Three) Times a Day.     • carbidopa-levodopa (SINEMET)  MG per tablet Take 2 tablets by mouth Every Morning.     • Cholecalciferol (VITAMIN D3) 86720 units tablet Take 1 tablet by mouth Every 14 (Fourteen) Days.     • gabapentin (NEURONTIN) 100 MG capsule Take 100 mg by mouth Every Night.     • meloxicam (MOBIC) 15 MG tablet Take 15 mg by mouth Daily. HOLD PRIOR TO SURGERY     • pantoprazole (PROTONIX) 20 MG EC tablet Take 20 mg by mouth Daily.     • sennosides-docusate sodium (SENOKOT-S) 8.6-50 MG tablet Take 2 tablets by mouth 2 (Two) Times a Day. 50 tablet 1     Allergies   Allergen Reactions   • Sulfa Antibiotics Nausea Only       Objective    Objective     Vital Signs  Temp:  [97 °F (36.1 °C)] 97 °F (36.1 °C)  Heart Rate:  [] 100  Resp:  [18] 18  BP: (125-163)/(67-99) 147/98  SpO2:  [92 %-98 %] 94 %  on  Flow (L/min):  [2] 2;      Body mass index is 23.55 kg/m².    Physical Exam   Constitutional: She is oriented to person, place, and time. She appears well-developed and well-nourished. No distress.   HENT:   Head: Normocephalic.   Eyes: EOM are normal.   Neck: Normal range of motion. No JVD present.   Cardiovascular: Normal rate. An irregular rhythm present.   A. fib on the monitor, rate 88-95 during my exam.  No chest pain.  Mild bilateral lower extremity edema   Pulmonary/Chest: Effort normal. She has rales in the right lower field and the left lower  field.   Patient on room air, sats 94 to 96%.   Abdominal: Soft. Bowel sounds are normal. She exhibits no distension. There is no tenderness.   Musculoskeletal: Normal range of motion.   Neurological: She is alert and oriented to person, place, and time. She has normal strength.   Skin: Skin is warm and dry. Capillary refill takes less than 2 seconds.   Psychiatric: She has a normal mood and affect. Her speech is normal and behavior is normal.   Nursing note and vitals reviewed.      Results Review:  I reviewed the patient's new clinical results.  I reviewed the patient's new imaging results and agree with the interpretation.  I reviewed the patient's other test results and agree with the interpretation  I personally viewed and interpreted the patient's EKG/Telemetry data  Discussed with ED provider.    Lab Results (last 24 hours)     Procedure Component Value Units Date/Time    CBC & Differential [[id number]] Collected:  05/06/20 2305    Specimen:  Blood Updated:  05/06/20 2318    Narrative:       The following orders were created for panel order CBC & Differential.  Procedure                               Abnormality         Status                     ---------                               -----------         ------                     CBC Auto Differential[[id number]]        Abnormal            Final result                 Please view results for these tests on the individual orders.    Comprehensive Metabolic Panel [[id number]]  (Abnormal) Collected:  05/06/20 2305    Specimen:  Blood Updated:  05/06/20 2339     Glucose 189 mg/dL      BUN 39 mg/dL      Creatinine 1.33 mg/dL      Sodium 127 mmol/L      Potassium 4.5 mmol/L      Chloride 87 mmol/L      CO2 22.6 mmol/L      Calcium 8.5 mg/dL      Total Protein 6.5 g/dL      Albumin 4.00 g/dL      ALT (SGPT) 12 U/L      AST (SGOT) 60 U/L      Alkaline Phosphatase 96 U/L      Total Bilirubin 0.6 mg/dL      eGFR Non African Amer 38 mL/min/1.73      Globulin 2.5 gm/dL       A/G Ratio 1.6 g/dL      BUN/Creatinine Ratio 29.3     Anion Gap 17.4 mmol/L     Narrative:       GFR Normal >60  Chronic Kidney Disease <60  Kidney Failure <15      BNP [[id number]]  (Abnormal) Collected:  05/06/20 2305    Specimen:  Blood Updated:  05/06/20 2338     proBNP 11,383.0 pg/mL     Narrative:       Among patients with dyspnea, NT-proBNP is highly sensitive for the detection of acute congestive heart failure. In addition NT-proBNP of <300 pg/ml effectively rules out acute congestive heart failure with 99% negative predictive value.    Results may be falsely decreased if patient taking Biotin.      Troponin [[id number]]  (Abnormal) Collected:  05/06/20 2305    Specimen:  Blood Updated:  05/06/20 2341     Troponin T 0.035 ng/mL     Narrative:       Troponin T Reference Range:  <= 0.03 ng/mL-   Negative for AMI  >0.03 ng/mL-     Abnormal for myocardial necrosis.  Clinicians would have to utilize clinical acumen, EKG, Troponin and serial changes to determine if it is an Acute Myocardial Infarction or myocardial injury due to an underlying chronic condition.       Results may be falsely decreased if patient taking Biotin.      Magnesium [[id number]]  (Normal) Collected:  05/06/20 2305    Specimen:  Blood Updated:  05/06/20 2339     Magnesium 2.0 mg/dL     CBC Auto Differential [[id number]]  (Abnormal) Collected:  05/06/20 2305    Specimen:  Blood Updated:  05/06/20 2318     WBC 14.05 10*3/mm3      RBC 3.53 10*6/mm3      Hemoglobin 10.7 g/dL      Hematocrit 31.3 %      MCV 88.7 fL      MCH 30.3 pg      MCHC 34.2 g/dL      RDW 12.3 %      RDW-SD 39.1 fl      MPV 11.4 fL      Platelets 227 10*3/mm3      Neutrophil % 77.4 %      Lymphocyte % 14.2 %      Monocyte % 7.8 %      Eosinophil % 0.1 %      Basophil % 0.1 %      Immature Grans % 0.4 %      Neutrophils, Absolute 10.87 10*3/mm3      Lymphocytes, Absolute 1.99 10*3/mm3      Monocytes, Absolute 1.10 10*3/mm3      Eosinophils, Absolute 0.01 10*3/mm3       Basophils, Absolute 0.02 10*3/mm3      Immature Grans, Absolute 0.06 10*3/mm3      nRBC 0.1 /100 WBC     Urinalysis With Microscopic If Indicated (No Culture) - Urine, Catheter [[id number]]  (Abnormal) Collected:  05/06/20 2328    Specimen:  Urine, Catheter Updated:  05/06/20 2353     Color, UA Yellow     Appearance, UA Cloudy     pH, UA <=5.0     Specific Gravity, UA 1.022     Glucose, UA Negative     Ketones, UA Trace     Bilirubin, UA Negative     Blood, UA Negative     Protein, UA 30 mg/dL (1+)     Leuk Esterase, UA Moderate (2+)     Nitrite, UA Positive     Urobilinogen, UA 1.0 E.U./dL    Urinalysis, Microscopic Only - Urine, Catheter [[id number]]  (Abnormal) Collected:  05/06/20 2328    Specimen:  Urine, Catheter Updated:  05/07/20 0006     RBC, UA 0-2 /HPF      WBC, UA 31-50 /HPF      Bacteria, UA 1+ /HPF      Squamous Epithelial Cells, UA 0-2 /HPF      Yeast, UA Small/1+ Yeast /HPF      Hyaline Casts, UA 3-6 /LPF      Methodology Manual Light Microscopy    Troponin [[id number]]  (Abnormal) Collected:  05/07/20 0026    Specimen:  Blood Updated:  05/07/20 0056     Troponin T 0.031 ng/mL     Narrative:       Troponin T Reference Range:  <= 0.03 ng/mL-   Negative for AMI  >0.03 ng/mL-     Abnormal for myocardial necrosis.  Clinicians would have to utilize clinical acumen, EKG, Troponin and serial changes to determine if it is an Acute Myocardial Infarction or myocardial injury due to an underlying chronic condition.       Results may be falsely decreased if patient taking Biotin.            Imaging Results (Last 24 Hours)     Procedure Component Value Units Date/Time    XR Chest 1 View [[id number]] Collected:  05/06/20 2357     Updated:  05/07/20 0001    Narrative:       PORTABLE CHEST RADIOGRAPH     HISTORY: Shortness of air and chest pain     COMPARISON: 02/03/2017     FINDINGS:  Cardiomegaly is identified. There is diffuse interstitial prominence,  concerning for vascular congestion. There are bilateral  pleural  effusions. Bibasilar consolidation is seen, which may represent  atelectasis. However, correlation with any symptoms of pneumonia is  suggested. No pneumothorax is seen.       Impression:          1. Cardiomegaly and vascular congestion and bilateral pleural effusions.  2. Bibasilar consolidation may represent atelectasis. Correlation with  any symptoms of pneumonia is recommended, however.     This report was finalized on 5/6/2020 11:58 PM by Dr. Rach Frazier M.D.       CT Head Without Contrast [[id number]] Collected:  05/06/20 2348     Updated:  05/06/20 2355    Narrative:       CT HEAD WITHOUT CONTRAST     HISTORY: Altered mental status. Patient is on blood thinners.     COMPARISON: None available.     TECHNIQUE: Axial CT imaging was obtained from vertex of the skull  through the skull base. No IV contrast was administered.     FINDINGS:  No acute intracranial hemorrhage is seen. There is diffuse atrophy.  There is no midline shift or mass effect. There is periventricular and  deep white matter microangiopathic change. Mucous retention cyst is seen  within the right maxillary sinus.       Impression:       No acute findings.     Radiation dose reduction techniques were utilized, including automated  exposure control and exposure modulation based on body size.     This report was finalized on 5/6/2020 11:51 PM by Dr. Rach Frazier M.D.                  ECG 12 Lead   Preliminary Result   HEART RATE= 81  bpm   RR Interval= 767  ms   OK Interval=   ms   P Horizontal Axis=   deg   P Front Axis=   deg   QRSD Interval= 91  ms   QT Interval= 429  ms   QRS Axis= 38  deg   T Wave Axis= 38  deg   - ABNORMAL ECG -   Atrial fibrillation   Minimal ST depression, lateral leads   Borderline prolonged QT interval   Electronically Signed By:    Date and Time of Study: 2020-05-06 23:19:24           Assessment/Plan     Active Hospital Problems    Diagnosis POA   • **Diastolic CHF, acute (CMS/HCC) [I50.31] Yes   •  New onset atrial fibrillation (CMS/HCC) [I48.91] Yes   • 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 Jimenez is a 86-year-old female with history of Parkinson's disease and GERD who presents to the emergency room with shortness of breath and chest tightness.     Acute diastolic CHF/A. fib  -Consult cardiology  -2D echo  -Lasix 40 mg IV every 12 hours, first dose given in the emergency room  -Telemetry unit  -Continue patient's Eliquis, she took first dose today, she was also started on metoprolol today    GERD/Parkinson's disease  -Continue home meds  -Monitor chronic condition, patient alert and oriented x4 at this time    Hyponatremia  -monitor BMP      · I discussed the patient's findings and my recommendations with patient and ED provider.    VTE Prophylaxis - SCDs.  Code Status - Full code.       ANNMARIE Adkins  [facility]  05/07/20  01:40

## 2020-05-07 NOTE — DISCHARGE PLACEMENT REQUEST
"Tank Tidwell (86 y.o. Female)     Date of Birth Social Security Number Address Home Phone MRN    1934  [id number]    [facility] [id number] [phone] [id number]    Confucianism Marital Status          Latter-day Single       Admission Date Admission Type Admitting Provider Attending Provider Department, Room/Bed    5/6/20 Emergency Naun Sparks MD Edling, Stephen A, MD [address], [address]    Discharge Date Discharge Disposition Discharge Destination                       Attending Provider:  Elvin Chen MD    Allergies:  Sulfa Antibiotics    Isolation:  None   Infection:  None   Code Status:  CPR    Ht:  165.1 cm (65\")   Wt:  63.5 kg (140 lb)    Admission Cmt:  None   Principal Problem:  Diastolic CHF, acute (CMS/HCC) [I50.31]                 Active Insurance as of 5/6/2020     Primary Coverage     Payor Plan Insurance Group Employer/Plan Group    MEDICARE MEDICARE A & B      Payor Plan Address Payor Plan Phone Number Payor Plan Fax Number Effective Dates    [address] [phone]  1/1/1999 - None Entered    [facility] [id number]       Subscriber Name Subscriber Birth Date Member ID       TANK TIDWELL 1934 [id number]           Secondary Coverage     Payor Plan Insurance Group Employer/Plan Group    [facility] SUPP [id number]     Payor Plan Address Payor Plan Phone Number Payor Plan Fax Number Effective Dates    [address]   12/1/2016 - None Entered    [facility] [id number]       Subscriber Name Subscriber Birth Date Member ID       TANK TIDWELL 1934 [id number]                 Emergency Contacts      (Rel.) Home Phone Work Phone Mobile Phone    Kathi Abdalla (Sister) [phone]    Winter Walls (Relative) [phone] -- --              "

## 2020-05-07 NOTE — PROGRESS NOTES
Discharge Planning Assessment  [facility]     Patient Name: Gregoria Jimenez  MRN: [id number]  Today's Date: 5/7/2020    Admit Date: 5/6/2020    Discharge Needs Assessment     Row Name 05/07/20 130       Living Environment    Lives With  alone    Name(s) of Who Lives With Patient  lives in assistant living community    Current Living Arrangements  independent/assisted living facility    Primary Care Provided by  self    Provides Primary Care For  no one, unable/limited ability to care for self    Family Caregiver if Needed  sibling(s)    Family Caregiver Names  Sister (Kathi) and her     Quality of Family Relationships  involved;supportive       Resource/Environmental Concerns    Resource/Environmental Concerns  none    Transportation Concerns  car, none       Transition Planning    Patient/Family Anticipates Transition to  home    Patient/Family Anticipated Services at Transition  none    Transportation Anticipated  family or friend will provide       Discharge Needs Assessment    Readmission Within the Last 30 Days  no previous admission in last 30 days    Concerns to be Addressed  discharge planning    Equipment Currently Used at Home  walker, rolling    Anticipated Changes Related to Illness  none    Outpatient/Agency/Support Group Needs  assisted living facility    Discharge Facility/Level of Care Needs  assisted living facility    Current Discharge Risk  chronically ill        Discharge Plan     Row Name 05/07/20 9168       Plan    Plan  Return to AL at [facility]    Patient/Family in Agreement with Plan  yes    Plan Comments  Facesheet verified, IMM given and CCP role explained.  Patient lives in AL at [facility].  They have a van for her to get to doctors appointment or her sister and her sisters  help sometimes.  She uses a walker at times and is usually pretty independent with ADLS.  She has been to [facility] for rehab in the past.  Plan is to return to [facility]  [facility] to [facility].  I called and left a message for Brenda/renetta at [facility]([phone]) to let me know the process of coming back after she is discharged and what would be needed.  Waiting on call back.  CCP will continue to follow closely.        Destination      Service Provider Request Status Selected Services Address Phone Number Fax Number    [facility] Pending - Request Sent N/A [id number] [facility] [address] [phone]      Durable Medical Equipment      Coordination has not been started for this encounter.      Dialysis/Infusion      Coordination has not been started for this encounter.      Home Medical Care      Coordination has not been started for this encounter.      Therapy      Coordination has not been started for this encounter.      Community Resources      Coordination has not been started for this encounter.          Demographic Summary     Row Name 05/07/20 1301       General Information    Admission Type  inpatient    Arrived From  home    Reason for Consult  discharge planning    Preferred Language  English     Used During This Interaction  no       Contact Information    Permission Granted to Share Info With  family/designee    Contact Information Comments  Sister Kathi Abdalla [phone]        Functional Status     Row Name 05/07/20 1302       Functional Status    Usual Activity Tolerance  good    Current Activity Tolerance  moderate       Functional Status, IADL    Medications  assistive equipment and person    Meal Preparation  assistive equipment and person    Housekeeping  assistive equipment and person    Laundry  assistive equipment and person    Shopping  assistive equipment and person       Mental Status    General Appearance WDL  WDL       Mental Status Summary    Recent Changes in Mental Status/Cognitive Functioning  mental status    Mental Status Comments  seems to be oriented now.        Psychosocial     Row Name 05/07/20 7951        Values/Beliefs    Spiritual, Cultural Beliefs, Taoism Practices, Values that Affect Care  no       Behavior WDL    Behavior WDL  WDL       Emotion Mood WDL    Emotion/Mood/Affect WDL  WDL       Speech WDL    Speech WDL  WDL       Perceptual State WDL    Perceptual State WDL  WDL       Thought Process WDL    Thought Process WDL  WDL       Intellectual Performance WDL    Intellectual Performance WDL  WDL       Coping/Stress    Major Change/Loss/Stressor  illness    Patient Personal Strengths  able to adapt;strong support system    Sources of Support  sibling(s)    Reaction to Health Status  adjusting    Understanding of Condition and Treatment  adequate understanding of medical condition       Developmental Stage (Eriksson's)    Developmental Stage  Stage 8 (65 years-death/Late Adulthood) Integrity vs. Despair        Abuse/Neglect     Row Name 05/07/20 1304       Personal Safety    Feels Unsafe at Home or Work/School  no    Feels Threatened by Someone  no    Does Anyone Try to Keep You From Having Contact with Others or Doing Things Outside Your Home?  no    Physical Signs of Abuse Present  no        Legal    No documentation.       Substance Abuse    No documentation.       Patient Forms    No documentation.           Shannon Epley, RN

## 2020-05-07 NOTE — PLAN OF CARE
Problem: Patient Care Overview  Goal: Plan of Care Review  Outcome: Ongoing (interventions implemented as appropriate)  Flowsheets (Taken 5/7/2020 9010)  Progress: improving  Plan of Care Reviewed With: patient  Outcome Summary: No c/o pain or soa. Continues on 3L NC. Echo done this morning. Remains in afib, HR 's now after cardizem gtt started at 5mg/hr. Dig IV given x1. Started on PO Eliquis and atenolol. Alert and oriented, but very forgetful. Continues on IV Lasix. Purewick in place.  Goal: Individualization and Mutuality  Outcome: Ongoing (interventions implemented as appropriate)  Goal: Discharge Needs Assessment  Outcome: Ongoing (interventions implemented as appropriate)  Goal: Interprofessional Rounds/Family Conf  Outcome: Ongoing (interventions implemented as appropriate)     Problem: Cardiac: Heart Failure (Adult)  Goal: Signs and Symptoms of Listed Potential Problems Will be Absent, Minimized or Managed (Cardiac: Heart Failure)  Outcome: Ongoing (interventions implemented as appropriate)     Problem: Arrhythmia/Dysrhythmia (Symptomatic) (Adult)  Goal: Signs and Symptoms of Listed Potential Problems Will be Absent, Minimized or Managed (Arrhythmia/Dysrhythmia)  Outcome: Ongoing (interventions implemented as appropriate)     Problem: Urinary Tract Infection (Adult)  Goal: Signs and Symptoms of Listed Potential Problems Will be Absent, Minimized or Managed (Urinary Tract Infection)  Outcome: Ongoing (interventions implemented as appropriate)     Problem: Fall Risk (Adult)  Goal: Identify Related Risk Factors and Signs and Symptoms  Outcome: Ongoing (interventions implemented as appropriate)  Goal: Absence of Fall  Outcome: Ongoing (interventions implemented as appropriate)

## 2020-05-07 NOTE — ED TRIAGE NOTES
Pt to ED via [facility] from assisted living facility per reports of AMS. Pt home health nurse came to visit her, reported she was behaving irritably and seemed confused. Pt on daily ASA. EMS gave 20mg diltiazem enroute for A-fib w/ RVR.

## 2020-05-08 PROBLEM — N39.0 ACUTE UTI (URINARY TRACT INFECTION): Status: ACTIVE | Noted: 2020-05-08

## 2020-05-08 LAB
ANION GAP SERPL CALCULATED.3IONS-SCNC: 14.2 MMOL/L (ref 5–15)
BUN BLD-MCNC: 34 MG/DL (ref 8–23)
BUN/CREAT SERPL: 34.3 (ref 7–25)
CALCIUM SPEC-SCNC: 8.5 MG/DL (ref 8.6–10.5)
CHLORIDE SERPL-SCNC: 86 MMOL/L (ref 98–107)
CO2 SERPL-SCNC: 29.8 MMOL/L (ref 22–29)
CREAT BLD-MCNC: 0.99 MG/DL (ref 0.57–1)
GFR SERPL CREATININE-BSD FRML MDRD: 53 ML/MIN/1.73
GLUCOSE BLD-MCNC: 105 MG/DL (ref 65–99)
MAGNESIUM SERPL-MCNC: 1.9 MG/DL (ref 1.6–2.4)
POTASSIUM BLD-SCNC: 3.2 MMOL/L (ref 3.5–5.2)
SARS-COV-2 RNA RESP QL NAA+PROBE: NOT DETECTED
SODIUM BLD-SCNC: 130 MMOL/L (ref 136–145)

## 2020-05-08 PROCEDURE — 87635 SARS-COV-2 COVID-19 AMP PRB: CPT | Performed by: HOSPITALIST

## 2020-05-08 PROCEDURE — 25010000002 CEFTRIAXONE PER 250 MG: Performed by: NURSE PRACTITIONER

## 2020-05-08 PROCEDURE — 25010000002 FUROSEMIDE PER 20 MG: Performed by: NURSE PRACTITIONER

## 2020-05-08 PROCEDURE — 25010000002 ONDANSETRON PER 1 MG: Performed by: NURSE PRACTITIONER

## 2020-05-08 PROCEDURE — 80048 BASIC METABOLIC PNL TOTAL CA: CPT | Performed by: INTERNAL MEDICINE

## 2020-05-08 PROCEDURE — 83735 ASSAY OF MAGNESIUM: CPT | Performed by: INTERNAL MEDICINE

## 2020-05-08 PROCEDURE — 99232 SBSQ HOSP IP/OBS MODERATE 35: CPT | Performed by: INTERNAL MEDICINE

## 2020-05-08 RX ORDER — POTASSIUM CHLORIDE 7.45 MG/ML
10 INJECTION INTRAVENOUS
Status: DISCONTINUED | OUTPATIENT
Start: 2020-05-08 | End: 2020-05-13 | Stop reason: HOSPADM

## 2020-05-08 RX ORDER — POTASSIUM CHLORIDE 1.5 G/1.77G
40 POWDER, FOR SOLUTION ORAL AS NEEDED
Status: DISCONTINUED | OUTPATIENT
Start: 2020-05-08 | End: 2020-05-13 | Stop reason: HOSPADM

## 2020-05-08 RX ORDER — POTASSIUM CHLORIDE 750 MG/1
40 CAPSULE, EXTENDED RELEASE ORAL AS NEEDED
Status: DISCONTINUED | OUTPATIENT
Start: 2020-05-08 | End: 2020-05-13 | Stop reason: HOSPADM

## 2020-05-08 RX ORDER — CEFTRIAXONE SODIUM 1 G/50ML
1 INJECTION, SOLUTION INTRAVENOUS EVERY 24 HOURS
Status: COMPLETED | OUTPATIENT
Start: 2020-05-08 | End: 2020-05-09

## 2020-05-08 RX ORDER — ATENOLOL 25 MG/1
25 TABLET ORAL EVERY 12 HOURS SCHEDULED
Status: DISCONTINUED | OUTPATIENT
Start: 2020-05-08 | End: 2020-05-13 | Stop reason: HOSPADM

## 2020-05-08 RX ADMIN — APIXABAN 2.5 MG: 2.5 TABLET, FILM COATED ORAL at 10:02

## 2020-05-08 RX ADMIN — SODIUM CHLORIDE, PRESERVATIVE FREE 10 ML: 5 INJECTION INTRAVENOUS at 21:14

## 2020-05-08 RX ADMIN — CARBIDOPA AND LEVODOPA 1.5 TABLET: 25; 100 TABLET ORAL at 10:02

## 2020-05-08 RX ADMIN — ONDANSETRON 4 MG: 2 INJECTION INTRAMUSCULAR; INTRAVENOUS at 18:03

## 2020-05-08 RX ADMIN — FUROSEMIDE 40 MG: 10 INJECTION, SOLUTION INTRAMUSCULAR; INTRAVENOUS at 21:23

## 2020-05-08 RX ADMIN — DOCUSATE SODIUM 50MG AND SENNOSIDES 8.6MG 2 TABLET: 8.6; 5 TABLET, FILM COATED ORAL at 10:02

## 2020-05-08 RX ADMIN — CARBIDOPA AND LEVODOPA 1.5 TABLET: 25; 100 TABLET ORAL at 21:13

## 2020-05-08 RX ADMIN — ATENOLOL 25 MG: 25 TABLET ORAL at 10:02

## 2020-05-08 RX ADMIN — APIXABAN 2.5 MG: 2.5 TABLET, FILM COATED ORAL at 21:14

## 2020-05-08 RX ADMIN — CARBIDOPA AND LEVODOPA 1.5 TABLET: 25; 100 TABLET ORAL at 15:53

## 2020-05-08 RX ADMIN — SODIUM CHLORIDE 6 MG/HR: 900 INJECTION, SOLUTION INTRAVENOUS at 07:01

## 2020-05-08 RX ADMIN — POTASSIUM CHLORIDE 40 MEQ: 10 CAPSULE, COATED, EXTENDED RELEASE ORAL at 18:03

## 2020-05-08 RX ADMIN — CEFTRIAXONE SODIUM 1 G: 1 INJECTION, SOLUTION INTRAVENOUS at 19:01

## 2020-05-08 RX ADMIN — ATENOLOL 25 MG: 25 TABLET ORAL at 21:14

## 2020-05-08 RX ADMIN — SODIUM CHLORIDE, PRESERVATIVE FREE 10 ML: 5 INJECTION INTRAVENOUS at 10:03

## 2020-05-08 RX ADMIN — FUROSEMIDE 40 MG: 10 INJECTION, SOLUTION INTRAMUSCULAR; INTRAVENOUS at 10:03

## 2020-05-08 RX ADMIN — PANTOPRAZOLE SODIUM 40 MG: 40 TABLET, DELAYED RELEASE ORAL at 06:46

## 2020-05-08 RX ADMIN — CARBIDOPA AND LEVODOPA 2 TABLET: 25; 100 TABLET ORAL at 06:47

## 2020-05-08 RX ADMIN — GABAPENTIN 100 MG: 100 CAPSULE ORAL at 21:14

## 2020-05-08 NOTE — PROGRESS NOTES
Name: Gregoria Jimenez ADMIT: 2020   : 1934  PCP: Rosanne Bland MD    MRN: [id number] LOS: 1 days   AGE/SEX: 86 y.o. female  ROOM: [facility]     Subjective   Subjective   Resting in bed. Feels okay. Dyspnea improved. No cough. No chest pain. No nausea or vomiting. Decreased appetite.      Objective   Objective   Vital Signs  Temp:  [96.5 °F (35.8 °C)-97.5 °F (36.4 °C)] 97.3 °F (36.3 °C)  Heart Rate:  [60-90] 63  Resp:  [18-20] 18  BP: (116-147)/() 141/63  SpO2:  [95 %-96 %] 95 %  on  Flow (L/min):  [3] 3;   Device (Oxygen Therapy): nasal cannula  Body mass index is 22.45 kg/m².     Physical Exam   Constitutional: She is oriented to person, place, and time. She appears well-developed and well-nourished. No distress.   HENT:   Head: Normocephalic and atraumatic.   Nose: Nose normal.   Mouth/Throat: Oropharynx is clear and moist.   Eyes: Conjunctivae are normal. Right eye exhibits no discharge. Left eye exhibits no discharge.   Neck: Normal range of motion. Neck supple.   Cardiovascular: Normal rate, normal heart sounds and intact distal pulses.   Pulmonary/Chest: Effort normal. No respiratory distress. She has rales (LLL posteriorly).   Abdominal: Soft. Bowel sounds are normal. She exhibits no distension. There is no tenderness.   Musculoskeletal: Normal range of motion. She exhibits no edema or tenderness.   Neurological: She is alert and oriented to person, place, and time. She exhibits normal muscle tone. Coordination normal.   Skin: Skin is warm and dry. She is not diaphoretic. No erythema.   Psychiatric: She has a normal mood and affect. Her behavior is normal.   Nursing note and vitals reviewed.      Results Review:       I reviewed the patient's new clinical results.  Results from last 7 days   Lab Units 20  0324 20  2305   WBC 10*3/mm3 9.36 14.05*   HEMOGLOBIN g/dL 10.6* 10.7*   PLATELETS 10*3/mm3 209 227     Results from last 7 days   Lab Units 20  0332  05/07/20  0324 05/06/20  2305   SODIUM mmol/L 130* 129* 127*   POTASSIUM mmol/L 3.2* 3.9 4.5   CHLORIDE mmol/L 86* 87* 87*   CO2 mmol/L 29.8* 23.9 22.6   BUN mg/dL 34* 38* 39*   CREATININE mg/dL 0.99 1.21* 1.33*   GLUCOSE mg/dL 105* 142* 189*   Estimated Creatinine Clearance: 39.4 mL/min (by C-G formula based on SCr of 0.99 mg/dL).  Results from last 7 days   Lab Units 05/06/20  2305   ALBUMIN g/dL 4.00   BILIRUBIN mg/dL 0.6   ALK PHOS U/L 96   AST (SGOT) U/L 60*   ALT (SGPT) U/L 12     Results from last 7 days   Lab Units 05/08/20  0333 05/07/20  0324 05/06/20  2305   CALCIUM mg/dL 8.5* 8.7 8.5*   ALBUMIN g/dL  --   --  4.00   MAGNESIUM mg/dL 1.9  --  2.0       No results found for: HGBA1C, POCGLU      apixaban 2.5 mg Oral Q12H   atenolol 25 mg Oral Q12H   carbidopa-levodopa 1.5 tablet Oral TID   carbidopa-levodopa 2 tablet Oral QAM   furosemide 40 mg Intravenous Q12H   gabapentin 100 mg Oral Nightly   pantoprazole 40 mg Oral Q AM   sennosides-docusate 2 tablet Oral BID   sodium chloride 10 mL Intravenous Q12H       dilTIAZem 5-15 mg/hr Last Rate: 5 mg/hr (05/08/20 1236)   Diet Regular; Cardiac       Assessment/Plan     Active Hospital Problems    Diagnosis  POA   • **Diastolic CHF, acute (CMS/HCC) [I50.31]  Yes   • New onset atrial fibrillation (CMS/HCC) [I48.91]  Yes   • 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     Acute diastolic CHF/A. fib  -Cardiology following. Rate improved. Atenolol increased and currently weaning off Cardizem gtt.   -Diuresis per Cards. Currently on Lasix 40 BID IV.  -2D echo showing EF 56%, moderate to severe mitral regurgitation. Plans to re-evaluate MR as outpatient once rate improved.  -Eliquis continued.  -On 4 L oxygen when entering the room. Sating %. Turned down to 2 L. Continue to wean with  diuresis.  -COVID19 testing completed given ? Infiltrate on CXR. This was negative. Patient without symptoms of pneumonia at this time. Leukocytosis resolved. Afebrile, no cough. Monitor for now.    Acute UTI  -Asymptomatic for symptoms, but abnormal UA. Initially, given 1 dose IV Rocephin. Culture growing >100,000 gram negative bacilli. Will given Rocephin x 2 more doses. Await C&S.     GERD/Parkinson's disease  -Stable. Sinemet and PPI continued.     Hyponatremia  -Sodium improving with diuresis. Suspected dilutional. Continue to monitor.      · I discussed the patient's findings and my recommendations with patient and Dr. Chen.     VTE Prophylaxis - SCDs.  Code Status - Full code.   Disposition - Anticipate discharge to [facility] [place] pending PT evaluation. Will await their recommendations.       ANNMARIE Morales  [facility]  05/08/20  16:32

## 2020-05-08 NOTE — PROGRESS NOTES
LOS: 1 day   Patient Care Team:  Rosanne Bland MD as PCP - General (Geriatric Medicine)    Chief Complaint: Follow-up for persistent atrial fibrillation with RVR, acute diastolic CHF, mitral regurgitation.    Interval History: Rate much better controlled.  SOA much better.  No CP.    Vital Signs:  Temp:  [96.5 °F (35.8 °C)-97.5 °F (36.4 °C)] 96.5 °F (35.8 °C)  Heart Rate:  [] 60  Resp:  [18-20] 18  BP: (114-161)/() 135/68    Intake/Output Summary (Last 24 hours) at 5/8/2020 1201  Last data filed at 5/8/2020 0944  Gross per 24 hour   Intake 890 ml   Output 2225 ml   Net -1335 ml       Physical Exam:   General Appearance:    No acute distress, alert and oriented x4   Lungs:     Faint rales at bases bilaterally    Heart:    Irregularly irregular rhythm with a normal rate.  II/VI SM throughout the precordium.   Abdomen:     Soft, non-tender, non-distended.    Extremities:   Moves all extremities well.  No clubbing, cyanosis, or edema.     Results Review:    Results from last 7 days   Lab Units 05/08/20  0333   SODIUM mmol/L 130*   POTASSIUM mmol/L 3.2*   CHLORIDE mmol/L 86*   CO2 mmol/L 29.8*   BUN mg/dL 34*   CREATININE mg/dL 0.99   GLUCOSE mg/dL 105*   CALCIUM mg/dL 8.5*     Results from last 7 days   Lab Units 05/07/20  0324 05/07/20  0026 05/06/20  2305   TROPONIN T ng/mL 0.030 0.031* 0.035*     Results from last 7 days   Lab Units 05/07/20  0324   WBC 10*3/mm3 9.36   HEMOGLOBIN g/dL 10.6*   HEMATOCRIT % 31.4*   PLATELETS 10*3/mm3 209     Results from last 7 days   Lab Units 05/07/20  0324   INR  1.24*         Results from last 7 days   Lab Units 05/08/20  0333   MAGNESIUM mg/dL 1.9           I reviewed the patient's new clinical results.        Assessment:  1.  Persistent atrial fibrillation with rapid ventricular response, recently diagnosed  2.  Acute diastolic CHF  3.  Moderate to severe mitral regurgitation  4.  Elevated troponin, likely related to CHF and nonischemic  5.  Chronic  kidney disease  6.  Hyponatremia  7.  Anemia (chronic)  8.  Parkinson's disease    Plan:  -Rate is much better since med changes.  Will increase Atenolol to 25 mg bid.  Wean Cardizem drip to off.    -Diuresing well - continue Lasix 40 mg IV q12h today.    -Continue Eliquis 2.5 mg bid.    -Would re-evaluate MR as an outpatient once the rate is controlled.    Main Lawrence MD  05/08/20  12:01

## 2020-05-08 NOTE — PLAN OF CARE
Problem: Patient Care Overview  Goal: Plan of Care Review  Outcome: Ongoing (interventions implemented as appropriate)  Note:   VSS; safety maintained; Pt c/o slight nausea this evening- medication given and responded well to; Patient oriented x 4 today; Cardizem successfully weaned off; continue to monitor

## 2020-05-08 NOTE — PROGRESS NOTES
Continued Stay Note  [facility]     Patient Name: Gregoria Jimenez  MRN: [id number]  Today's Date: 5/8/2020    Admit Date: 5/6/2020    Discharge Plan     Row Name 05/08/20 1053       Plan    Plan  Return to [facility] (PT eval pending)    Patient/Family in Agreement with Plan  yes    Plan Comments  I talked to Brenda/[facility] [place] and she states that the patient was independent with walker before being admitted.  She was working with their in house PT therapist Luis from [facility] ([phone]) .  She states that all patient that go to the hospital are needed a COVID negative test in order for them to accept them back and she will need to be no more than 1 assist in order to return.  Pt eval in.        Discharge Codes    No documentation.             Shannon Epley, RN

## 2020-05-08 NOTE — PLAN OF CARE
Problem: Patient Care Overview  Goal: Plan of Care Review  Flowsheets (Taken 5/8/2020 0809)  Plan of Care Reviewed With: patient  Outcome Summary: VSS cardizem drip continues no new complaints. maintain safety and continue to monitor

## 2020-05-09 LAB
ANION GAP SERPL CALCULATED.3IONS-SCNC: 13.6 MMOL/L (ref 5–15)
BACTERIA SPEC AEROBE CULT: ABNORMAL
BUN BLD-MCNC: 25 MG/DL (ref 8–23)
BUN/CREAT SERPL: 30.5 (ref 7–25)
CALCIUM SPEC-SCNC: 9 MG/DL (ref 8.6–10.5)
CHLORIDE SERPL-SCNC: 87 MMOL/L (ref 98–107)
CO2 SERPL-SCNC: 30.4 MMOL/L (ref 22–29)
CREAT BLD-MCNC: 0.82 MG/DL (ref 0.57–1)
DEPRECATED RDW RBC AUTO: 40.6 FL (ref 37–54)
ERYTHROCYTE [DISTWIDTH] IN BLOOD BY AUTOMATED COUNT: 12.6 % (ref 12.3–15.4)
GFR SERPL CREATININE-BSD FRML MDRD: 66 ML/MIN/1.73
GLUCOSE BLD-MCNC: 134 MG/DL (ref 65–99)
HCT VFR BLD AUTO: 33.8 % (ref 34–46.6)
HGB BLD-MCNC: 11.5 G/DL (ref 12–15.9)
MAGNESIUM SERPL-MCNC: 1.9 MG/DL (ref 1.6–2.4)
MCH RBC QN AUTO: 30 PG (ref 26.6–33)
MCHC RBC AUTO-ENTMCNC: 34 G/DL (ref 31.5–35.7)
MCV RBC AUTO: 88.3 FL (ref 79–97)
PLATELET # BLD AUTO: 223 10*3/MM3 (ref 140–450)
PMV BLD AUTO: 10.8 FL (ref 6–12)
POTASSIUM BLD-SCNC: 3.5 MMOL/L (ref 3.5–5.2)
POTASSIUM BLD-SCNC: 3.8 MMOL/L (ref 3.5–5.2)
RBC # BLD AUTO: 3.83 10*6/MM3 (ref 3.77–5.28)
SODIUM BLD-SCNC: 131 MMOL/L (ref 136–145)
WBC NRBC COR # BLD: 8.59 10*3/MM3 (ref 3.4–10.8)

## 2020-05-09 PROCEDURE — 25010000002 ONDANSETRON PER 1 MG: Performed by: NURSE PRACTITIONER

## 2020-05-09 PROCEDURE — 25010000002 FUROSEMIDE PER 20 MG: Performed by: NURSE PRACTITIONER

## 2020-05-09 PROCEDURE — 25010000003 POTASSIUM CHLORIDE 10 MEQ/100ML SOLUTION: Performed by: HOSPITALIST

## 2020-05-09 PROCEDURE — 25010000002 DIGOXIN PER 500 MCG: Performed by: INTERNAL MEDICINE

## 2020-05-09 PROCEDURE — 99232 SBSQ HOSP IP/OBS MODERATE 35: CPT | Performed by: INTERNAL MEDICINE

## 2020-05-09 PROCEDURE — 97162 PT EVAL MOD COMPLEX 30 MIN: CPT

## 2020-05-09 PROCEDURE — 85027 COMPLETE CBC AUTOMATED: CPT | Performed by: INTERNAL MEDICINE

## 2020-05-09 PROCEDURE — 84132 ASSAY OF SERUM POTASSIUM: CPT | Performed by: HOSPITALIST

## 2020-05-09 PROCEDURE — 25010000002 CEFTRIAXONE PER 250 MG: Performed by: NURSE PRACTITIONER

## 2020-05-09 PROCEDURE — 83735 ASSAY OF MAGNESIUM: CPT | Performed by: INTERNAL MEDICINE

## 2020-05-09 PROCEDURE — 80048 BASIC METABOLIC PNL TOTAL CA: CPT | Performed by: INTERNAL MEDICINE

## 2020-05-09 RX ORDER — DIGOXIN 0.25 MG/ML
250 INJECTION INTRAMUSCULAR; INTRAVENOUS ONCE
Status: COMPLETED | OUTPATIENT
Start: 2020-05-09 | End: 2020-05-09

## 2020-05-09 RX ORDER — FUROSEMIDE 40 MG/1
40 TABLET ORAL DAILY
Status: DISCONTINUED | OUTPATIENT
Start: 2020-05-09 | End: 2020-05-13

## 2020-05-09 RX ADMIN — SODIUM CHLORIDE, PRESERVATIVE FREE 10 ML: 5 INJECTION INTRAVENOUS at 21:44

## 2020-05-09 RX ADMIN — ATENOLOL 25 MG: 25 TABLET ORAL at 09:33

## 2020-05-09 RX ADMIN — FUROSEMIDE 40 MG: 10 INJECTION, SOLUTION INTRAMUSCULAR; INTRAVENOUS at 09:34

## 2020-05-09 RX ADMIN — DIGOXIN 250 MCG: 0.25 INJECTION INTRAMUSCULAR; INTRAVENOUS at 15:26

## 2020-05-09 RX ADMIN — FUROSEMIDE 40 MG: 40 TABLET ORAL at 15:25

## 2020-05-09 RX ADMIN — POTASSIUM CHLORIDE 10 MEQ: 7.46 INJECTION, SOLUTION INTRAVENOUS at 03:22

## 2020-05-09 RX ADMIN — GABAPENTIN 100 MG: 100 CAPSULE ORAL at 21:43

## 2020-05-09 RX ADMIN — ONDANSETRON 4 MG: 2 INJECTION INTRAMUSCULAR; INTRAVENOUS at 17:59

## 2020-05-09 RX ADMIN — DOCUSATE SODIUM 50MG AND SENNOSIDES 8.6MG 2 TABLET: 8.6; 5 TABLET, FILM COATED ORAL at 09:34

## 2020-05-09 RX ADMIN — DOCUSATE SODIUM 50MG AND SENNOSIDES 8.6MG 2 TABLET: 8.6; 5 TABLET, FILM COATED ORAL at 21:44

## 2020-05-09 RX ADMIN — CARBIDOPA AND LEVODOPA 2 TABLET: 25; 100 TABLET ORAL at 07:01

## 2020-05-09 RX ADMIN — CARBIDOPA AND LEVODOPA 1.5 TABLET: 25; 100 TABLET ORAL at 09:33

## 2020-05-09 RX ADMIN — ATENOLOL 25 MG: 25 TABLET ORAL at 21:43

## 2020-05-09 RX ADMIN — POTASSIUM CHLORIDE 10 MEQ: 7.46 INJECTION, SOLUTION INTRAVENOUS at 00:20

## 2020-05-09 RX ADMIN — CEFTRIAXONE SODIUM 1 G: 1 INJECTION, SOLUTION INTRAVENOUS at 17:59

## 2020-05-09 RX ADMIN — SODIUM CHLORIDE, PRESERVATIVE FREE 10 ML: 5 INJECTION INTRAVENOUS at 09:35

## 2020-05-09 RX ADMIN — ONDANSETRON 4 MG: 2 INJECTION INTRAMUSCULAR; INTRAVENOUS at 00:11

## 2020-05-09 RX ADMIN — CARBIDOPA AND LEVODOPA 1.5 TABLET: 25; 100 TABLET ORAL at 15:25

## 2020-05-09 RX ADMIN — CARBIDOPA AND LEVODOPA 1.5 TABLET: 25; 100 TABLET ORAL at 21:43

## 2020-05-09 RX ADMIN — APIXABAN 2.5 MG: 2.5 TABLET, FILM COATED ORAL at 21:44

## 2020-05-09 RX ADMIN — PANTOPRAZOLE SODIUM 40 MG: 40 TABLET, DELAYED RELEASE ORAL at 07:01

## 2020-05-09 RX ADMIN — POTASSIUM CHLORIDE 10 MEQ: 7.46 INJECTION, SOLUTION INTRAVENOUS at 05:58

## 2020-05-09 RX ADMIN — APIXABAN 2.5 MG: 2.5 TABLET, FILM COATED ORAL at 09:33

## 2020-05-09 NOTE — PROGRESS NOTES
Name: Gregoria Jimenez ADMIT: 2020   : 1934  PCP: Rosanne Bland MD    MRN: [id number] LOS: 2 days   AGE/SEX: 86 y.o. female  ROOM: [facility]     Subjective   Subjective   No complaints.  Seems confused.    Review of Systems     Objective   Objective   Vital Signs  Temp:  [97.2 °F (36.2 °C)-97.7 °F (36.5 °C)] 97.6 °F (36.4 °C)  Heart Rate:  [71-96] 96  Resp:  [18] 18  BP: (145-171)/(74-95) 155/84  SpO2:  [95 %-99 %] 99 %  on  Flow (L/min):  [2-3] 2;   Device (Oxygen Therapy): nasal cannula  Body mass index is 22.45 kg/m².  Physical Exam   Constitutional: She appears well-developed and well-nourished. No distress.   HENT:   Head: Normocephalic and atraumatic.   Nose: Nose normal.   Mouth/Throat: Oropharynx is clear and moist.   Eyes: Right eye exhibits no discharge. Left eye exhibits no discharge.   Neck: Neck supple.   Cardiovascular: Normal rate and intact distal pulses. An irregularly irregular rhythm present.   Pulmonary/Chest: Effort normal. No respiratory distress. She has no rales.   Abdominal: Soft. Bowel sounds are normal. She exhibits no distension. There is no tenderness.   Musculoskeletal: Normal range of motion. She exhibits no edema or tenderness.   Neurological: She is alert.   Tremor noted.  Slightly confused pleasant   Skin: Skin is warm and dry. She is not diaphoretic. No erythema.   Psychiatric: She has a normal mood and affect.   Nursing note and vitals reviewed.      Results Review:       I reviewed the patient's new clinical results.  Results from last 7 days   Lab Units 20  0446 20  0324 20  2305   WBC 10*3/mm3 8.59 9.36 14.05*   HEMOGLOBIN g/dL 11.5* 10.6* 10.7*   PLATELETS 10*3/mm3 223 209 227     Results from last 7 days   Lab Units 20  1151 20  0446 20  0333 20  0324 20  2305   SODIUM mmol/L  --  131* 130* 129* 127*   POTASSIUM mmol/L 3.8 3.5 3.2* 3.9 4.5   CHLORIDE mmol/L  --  87* 86* 87* 87*   CO2 mmol/L  --  30.4*  29.8* 23.9 22.6   BUN mg/dL  --  25* 34* 38* 39*   CREATININE mg/dL  --  0.82 0.99 1.21* 1.33*   GLUCOSE mg/dL  --  134* 105* 142* 189*   Estimated Creatinine Clearance: 47.6 mL/min (by C-G formula based on SCr of 0.82 mg/dL).  Results from last 7 days   Lab Units 05/06/20  2305   ALBUMIN g/dL 4.00   BILIRUBIN mg/dL 0.6   ALK PHOS U/L 96   AST (SGOT) U/L 60*   ALT (SGPT) U/L 12     Results from last 7 days   Lab Units 05/09/20  0446 05/08/20  0333 05/07/20  0324 05/06/20  2305   CALCIUM mg/dL 9.0 8.5* 8.7 8.5*   ALBUMIN g/dL  --   --   --  4.00   MAGNESIUM mg/dL 1.9 1.9  --  2.0       No results found for: HGBA1C, POCGLU      apixaban 2.5 mg Oral Q12H   atenolol 25 mg Oral Q12H   carbidopa-levodopa 1.5 tablet Oral TID   carbidopa-levodopa 2 tablet Oral QAM   cefTRIAXone 1 g Intravenous Q24H   digoxin 250 mcg Intravenous Once   furosemide 40 mg Oral Daily   gabapentin 100 mg Oral Nightly   pantoprazole 40 mg Oral Q AM   sennosides-docusate 2 tablet Oral BID   sodium chloride 10 mL Intravenous Q12H       dilTIAZem 5-15 mg/hr Last Rate: Stopped (05/08/20 1901)   Diet Regular; Cardiac       Assessment/Plan     Active Hospital Problems    Diagnosis  POA   • **Diastolic CHF, acute (CMS/HCC) [I50.31]  Yes   • Acute UTI (urinary tract infection) [N39.0]  Yes   • New onset atrial fibrillation (CMS/HCC) [I48.91]  Yes   • 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     Acute diastolic CHF  - Weight down considerably, diuresed well  -Agree with stopping IV Lasix today and switching to oral    UTI-culture growing E. coli  -Complete Rocephin today.  Doubt pneumonia, infiltrates on x-ray more likely secondary to the above issue    Atrial fibrillation  - IV digoxin given again today for borderline rates.  Otherwise continues on atenolol and  Eliquis    Disposition- PT note reviewed.  Plans to discharge back to previous living environment when stable, likely Sunday/Monday.      Alli Anne MD  [facility]  05/09/20  15:09

## 2020-05-09 NOTE — THERAPY EVALUATION
Patient Name: Gregoria Jimenez  : 1934    MRN: [id number]                              Today's Date: 2020       Admit Date: 2020    Visit Dx:     ICD-10-CM ICD-9-CM   1. New onset atrial fibrillation (CMS/HCC) I48.91 427.31   2. Acute congestive heart failure, unspecified heart failure type (CMS/HCC) I50.9 428.0   3. Acute UTI N39.0 599.0   4. Altered mental status, unspecified altered mental status type (Nursing home reported) R41.82 780.97     Patient Active Problem List   Diagnosis   • Osteoarthritis of left knee   • Osteoarthritis of right knee   • New onset atrial fibrillation (CMS/HCC)   • Diastolic CHF, acute (CMS/HCC)   • Parkinson's disease dementia (CMS/HCC)   • GERD (gastroesophageal reflux disease)   • Hyponatremia   • Acute UTI (urinary tract infection)     Past Medical History:   Diagnosis Date   • Arthritis    • Constipation    • Eye hemorrhage, right     BLOOD SHOT RIGHT EYE-2019 EYE EXAM-DR. ROSALES INFORMED NO SIG OF INFECTION-PT MOST LIKELY HAPPENED FRO A SNEEZE OR COUGH-BLOOD VESSELS BROKEN   • GERD (gastroesophageal reflux disease)    • History of bronchiectasis        • Hypertension    • Limited joint range of motion (ROM)     RIGHT KNEE   • Parkinson disease (CMS/HCC)    • Vitamin D deficiency      Past Surgical History:   Procedure Laterality Date   • CATARACT EXTRACTION W/ INTRAOCULAR LENS  IMPLANT, BILATERAL     • COLONOSCOPY W/ BIOPSIES AND POLYPECTOMY      BENIGN   • KNEE ARTHROSCOPY     • NJ TOTAL KNEE ARTHROPLASTY Left 2/15/2017    Procedure: LT TOTAL KNEE ARTHROPLASTY;  Surgeon: Gregory Carvajal MD;  Location: [facility];  Service: Orthopedics   • TOTAL KNEE ARTHROPLASTY Right 5/15/2019    Procedure: RIGHT TOTAL KNEE ARTHROPLASTY;  Surgeon: Gregory Carvajal MD;  Location: [facility];  Service: Orthopedics     General Information     Row Name 20 1420          PT Evaluation Time/Intention    Document Type  evaluation  -LC     Mode of Treatment  physical  therapy  -     Row Name 05/09/20 1420          General Information    Patient Profile Reviewed?  yes  -     Prior Level of Function  -- correction resident, uses rollator  -     Existing Precautions/Restrictions  oxygen therapy device and L/min;fall  -     Barriers to Rehab  previous functional deficit;physical barrier  -     Row Name 05/09/20 1420          Relationship/Environment    Lives With  alone  -     Row Name 05/09/20 1420          Resource/Environmental Concerns    Current Living Arrangements  independent/assisted living facility  -     Row Name 05/09/20 1420          Home Main Entrance    Number of Stairs, Main Entrance  none  -     Row Name 05/09/20 1420          Stairs Within Home, Primary    Number of Stairs, Within Home, Primary  none  -     Row Name 05/09/20 1420          Cognitive Assessment/Intervention- PT/OT    Orientation Status (Cognition)  oriented x 3  -     Cognitive Assessment/Intervention Comment  Pt agreeable to work with PT, but states she feels nauseated and tired. She states she has a R quad issue that warms up as she walks and she normally wears a heel lift in her R shoe.  -     Row Name 05/09/20 1420          Safety Issues, Functional Mobility    Impairments Affecting Function (Mobility)  endurance/activity tolerance;strength;balance  -       User Key  (r) = Recorded By, (t) = Taken By, (c) = Cosigned By    Initials Name Provider Type     Jennifer King, RONIT Physical Therapist        Mobility     Row Name 05/09/20 1422          Bed Mobility Assessment/Treatment    Bed Mobility Assessment/Treatment  supine-sit;sit-supine;scooting/bridging  -     Scooting/Bridging [place] (Bed Mobility)  independent  -     Supine-Sit [place] (Bed Mobility)  conditional independence  -     Sit-Supine [place] (Bed Mobility)  minimum assist (75% patient effort)  -     Assistive Device (Bed Mobility)  bed rails;head of bed elevated  -     Comment (Bed Mobility)  Pt  able to get EOB with HR and extra time given. She needed min A for BLEs to get back into bed.  -     Row Name 05/09/20 1422          Transfer Assessment/Treatment    Comment (Transfers)  pt needed multiple attempts to get into standing  -     Row Name 05/09/20 1422          Sit-Stand Transfer    Sit-Stand [place] (Transfers)  minimum assist (75% patient effort)  -     Assistive Device (Sit-Stand Transfers)  walker, front-wheeled  -     Row Name 05/09/20 1422          Gait/Stairs Assessment/Training    Gait/Stairs Assessment/Training  gait/ambulation independence;gait/ambulation assistive device  -     [place] Level (Gait)  contact guard  -     Assistive Device (Gait)  walker, front-wheeled  -     Distance in Feet (Gait)  100'  -     Pattern (Gait)  step-to  -     Deviations/Abnormal Patterns (Gait)  stride length decreased;antalgic  -     Right Sided Gait Deviations  heel strike decreased;weight shift ability decreased  -     Comment (Gait/Stairs)  Pt unable to get right heel down as she normally wears a heel lift to compensate for her leg length difference. She ambulated quickly  -       User Key  (r) = Recorded By, (t) = Taken By, (c) = Cosigned By    Initials Name Provider Type    Jennifer Mayfield, PT Physical Therapist        Obj/Interventions     Row Name 05/09/20 1427          General ROM    GENERAL ROM COMMENTS  BLEs grossly WFL  -     Row Name 05/09/20 1427          MMT (Manual Muscle Testing)    General MMT Comments  BLEs grossly 4/5  -     Row Name 05/09/20 1427          Static Sitting Balance    Level of [place] (Unsupported Sitting, Static Balance)  independent  -     Row Name 05/09/20 1427          Dynamic Sitting Balance    Level of [place], Reaches Outside Midline (Sitting, Dynamic Balance)  independent  -     Row Name 05/09/20 1427          Static Standing Balance    Level of [place] (Supported Standing, Static Balance)  minimal assist, 75%  patient effort  -     Assistive Device Utilized (Supported Standing, Static Balance)  walker, rolling  -LC     Row Name 05/09/20 1427          Dynamic Standing Balance    Level of [place], Reaches Outside Midline (Standing, Dynamic Balance)  unable to perform activity  -     Row Name 05/09/20 1427          Sensory Assessment/Intervention    Sensory General Assessment  no sensation deficits identified  -       User Key  (r) = Recorded By, (t) = Taken By, (c) = Cosigned By    Initials Name Provider Type    Jennifer Mayfield, PT Physical Therapist        Goals/Plan     Row Name 05/09/20 1431          Bed Mobility Goal 1 (PT)    Activity/Assistive Device (Bed Mobility Goal 1, PT)  bed mobility activities, all  -LC     [place] Level/Cues Needed (Bed Mobility Goal 1, PT)  conditional independence  -LC     Time Frame (Bed Mobility Goal 1, PT)  short term goal (STG)  -     Row Name 05/09/20 1431          Transfer Goal 1 (PT)    Activity/Assistive Device (Transfer Goal 1, PT)  sit-to-stand/stand-to-sit;walker, rolling  -LC     [place] Level/Cues Needed (Transfer Goal 1, PT)  conditional independence  -LC     Time Frame (Transfer Goal 1, PT)  short term goal (STG)  -     Row Name 05/09/20 1431          Gait Training Goal 1 (PT)    Activity/Assistive Device (Gait Training Goal 1, PT)  gait (walking locomotion);walker, rolling  -LC     [place] Level (Gait Training Goal 1, PT)  conditional independence  -LC     Distance (Gait Goal 1, PT)  150'  -LC     Time Frame (Gait Training Goal 1, PT)  by discharge  -       User Key  (r) = Recorded By, (t) = Taken By, (c) = Cosigned By    Initials Name Provider Type    Jennifer Mayfield PT Physical Therapist        Clinical Impression     Row Name 05/09/20 1427          Plan of Care Review    Plan of Care Reviewed With  patient  -     Progress  improving  -     Outcome Summary  Pt is an 85 yo female who presents to the hospital with CHF symptoms and a UTI. She  has a history of Parkinson's with dementia and lives in an [facility] where she uses a rollator. Today Gregoria ambulated 100' with a RWx and mild gait deficits that appear to be present at baseline. She will cont to beneft from PT during her hospital stay and return to her previous PT upon DC.  -     Row Name 05/09/20 1427          Physical Therapy Clinical Impression    Criteria for Skilled Interventions Met (PT Clinical Impression)  yes;treatment indicated  -     Rehab Potential (PT Clinical Summary)  good, to achieve stated therapy goals  -     Row Name 05/09/20 1427          Vital Signs    O2 Delivery Pre Treatment  supplemental O2  -LC     O2 Delivery Intra Treatment  supplemental O2  -LC     O2 Delivery Post Treatment  supplemental O2  -LC     Row Name 05/09/20 1427          Positioning and Restraints    Pre-Treatment Position  in bed  -     Post Treatment Position  bed  -LC     In Bed  supine;call light within reach;encouraged to call for assist;exit alarm on  -       User Key  (r) = Recorded By, (t) = Taken By, (c) = Cosigned By    Initials Name Provider Type    Jennifer Mayfield, PT Physical Therapist        Outcome Measures     Row Name 05/09/20 1432          How much help from another person do you currently need...    Turning from your back to your side while in flat bed without using bedrails?  3  -LC     Moving from lying on back to sitting on the side of a flat bed without bedrails?  3  -LC     Moving to and from a bed to a chair (including a wheelchair)?  3  -LC     Standing up from a chair using your arms (e.g., wheelchair, bedside chair)?  3  -LC     Climbing 3-5 steps with a railing?  2  -LC     To walk in hospital room?  3  -LC     AM-PAC 6 Clicks Score (PT)  17  -LC     Row Name 05/09/20 1432          Functional Assessment    Outcome Measure Options  AM-PAC 6 Clicks Basic Mobility (PT)  -       User Key  (r) = Recorded By, (t) = Taken By, (c) = Cosigned By    Initials Name Provider Type    HALEY  Jennifer King, PT Physical Therapist        Physical Therapy Education                 Title: PT OT SLP Therapies (Done)     Topic: Physical Therapy (Done)     Point: Mobility training (Done)     Description:   Instruct learner(s) on safety and technique for assisting patient out of bed, chair or wheelchair.  Instruct in the proper use of assistive devices, such as walker, crutches, cane or brace.              Patient Friendly Description:   It's important to get you on your feet again, but we need to do so in a way that is safe for you. Falling has serious consequences, and your personal safety is the most important thing of all.        When it's time to get out of bed, one of us or a family member will sit next to you on the bed to give you support.     If your doctor or nurse tells you to use a walker, crutches, a cane, or a brace, be sure you use it every time you get out of bed, even if you think you don't need it.    Learning Progress Summary           Patient Acceptance, E,TB, VU,NR by  at 5/9/2020 1433                   Point: Body mechanics (Done)     Description:   Instruct learner(s) on proper positioning and spine alignment for patient and/or caregiver during mobility tasks and/or exercises.              Learning Progress Summary           Patient Acceptance, E,TB, VU,NR by  at 5/9/2020 1433                   Point: Precautions (Done)     Description:   Instruct learner(s) on prescribed precautions during mobility and gait tasks              Learning Progress Summary           Patient Acceptance, E,TB, VU,NR by  at 5/9/2020 1433                               User Key     Initials Effective Dates Name Provider Type Discipline     08/23/19 -  Jennifer King, PT Physical Therapist PT              PT Recommendation and Plan  Planned Therapy Interventions (PT Eval): balance training, bed mobility training, gait training, home exercise program, strengthening, patient/family education, neuromuscular  re-education, transfer training  Outcome Summary/Treatment Plan (PT)  Anticipated Discharge Disposition (PT): home, assisted living facility ([facility]), anticipate therapy at next level of care  Plan of Care Reviewed With: patient  Progress: improving  Outcome Summary: Pt is an 87 yo female who presents to the hospital with CHF symptoms and a UTI. She has a history of Parkinson's with dementia and lives in an [facility] where she uses a rollator. Today Gregoria ambulated 100' with a RWx and mild gait deficits that appear to be present at baseline. She will cont to beneft from PT during her hospital stay and return to her previous PT upon DC.     Time Calculation:   PT Charges     Row Name 05/09/20 1434             Time Calculation    Start Time  1400  -      Stop Time  1418  -      Time Calculation (min)  18 min  -      PT Received On  05/09/20  -      PT - Next Appointment  05/10/20  -      PT Goal Re-Cert Due Date  05/16/20  -        User Key  (r) = Recorded By, (t) = Taken By, (c) = Cosigned By    Initials Name Provider Type    Jennifer Mayfield, PT Physical Therapist        Therapy Charges for Today     Code Description Service Date Service Provider Modifiers Qty    65614026074 HC PT EVAL MOD COMPLEXITY 2 5/9/2020 Jennifer King, PT GP 1          PT G-Codes  Outcome Measure Options: AM-PAC 6 Clicks Basic Mobility (PT)  AM-PAC 6 Clicks Score (PT): 17    Jennifer King PT  5/9/2020

## 2020-05-09 NOTE — PLAN OF CARE
Problem: Patient Care Overview  Goal: Plan of Care Review  Outcome: Ongoing (interventions implemented as appropriate)  Flowsheets (Taken 5/9/2020 1426)  Progress: improving  Plan of Care Reviewed With: patient  Outcome Summary: Pt is an 87 yo female who presents to the hospital with CHF symptoms and a UTI. She has a history of Parkinson's with dementia and lives in an halfway where she uses a rollator. Today Gregoria ambulated 100' with a RWx and mild gait deficits that appear to be present at baseline. She will cont to beneft from PT during her hospital stay and return to her previous PT upon DC.  Patient was not wearing a face mask during this therapy encounter. Therapist used appropriate personal protective equipment including mask and gloves.  Mask used was standard procedure mask. Appropriate PPE was worn during the entire therapy session. Hand hygiene was completed before and after therapy session. Patient is not in enhanced droplet precautions.

## 2020-05-09 NOTE — PROGRESS NOTES
LOS: 2 days   Patient Care Team:  Rosanne Bland MD as PCP - General (Geriatric Medicine)    Chief Complaint: Follow-up for persistent atrial fibrillation with RVR, acute diastolic CHF, mitral regurgitation.    Interval History: She is fatigued this morning that she did not sleep well.  She has had no chest pain.  Her shortness of breath is improved.  She has no edema.  Her rate is for the most part controlled, although occasionally goes into the lower 100s.    Vital Signs:  Temp:  [97.2 °F (36.2 °C)-97.7 °F (36.5 °C)] 97.6 °F (36.4 °C)  Heart Rate:  [71-96] 96  Resp:  [18] 18  BP: (145-171)/(74-95) 155/84    Intake/Output Summary (Last 24 hours) at 5/9/2020 1431  Last data filed at 5/9/2020 1145  Gross per 24 hour   Intake 220 ml   Output 2150 ml   Net -1930 ml       Physical Exam:   General Appearance:    No acute distress, alert and oriented x4   Lungs:    Clear to auscultation bilaterally    Heart:    Irregularly irregular rhythm with a normal rate.  II/VI SM throughout the precordium.   Abdomen:     Soft, non-tender, non-distended.    Extremities:   Moves all extremities well.  No clubbing, cyanosis, or edema.     Results Review:    Results from last 7 days   Lab Units 05/09/20  1151 05/09/20  0446   SODIUM mmol/L  --  131*   POTASSIUM mmol/L 3.8 3.5   CHLORIDE mmol/L  --  87*   CO2 mmol/L  --  30.4*   BUN mg/dL  --  25*   CREATININE mg/dL  --  0.82   GLUCOSE mg/dL  --  134*   CALCIUM mg/dL  --  9.0     Results from last 7 days   Lab Units 05/07/20  0324 05/07/20  0026 05/06/20  2305   TROPONIN T ng/mL 0.030 0.031* 0.035*     Results from last 7 days   Lab Units 05/09/20  0446   WBC 10*3/mm3 8.59   HEMOGLOBIN g/dL 11.5*   HEMATOCRIT % 33.8*   PLATELETS 10*3/mm3 223     Results from last 7 days   Lab Units 05/07/20  0324   INR  1.24*         Results from last 7 days   Lab Units 05/09/20  0446   MAGNESIUM mg/dL 1.9           I reviewed the patient's new clinical results.        Assessment:  1.   Persistent atrial fibrillation with rapid ventricular response, recently diagnosed  2.  Acute diastolic CHF  3.  Moderate to severe mitral regurgitation  4.  Elevated troponin, likely related to CHF and nonischemic  5.  Chronic kidney disease  6.  Hyponatremia  7.  Anemia (chronic)  8.  Parkinson's disease    Plan:  -Continue atenolol 25 mg twice a day.  I will give her 1 more dose of IV digoxin today.  Her rate is for the most part controlled.    -Stop IV Lasix.  Start Lasix 40 mg orally per day and assess her response to this.    -Continue Eliquis 2.5 mg bid.    -Would re-evaluate MR as an outpatient once the rate is controlled.    -Overall, I feel she is doing better.    Main Lawrence MD  05/09/20  14:31

## 2020-05-10 LAB
ANION GAP SERPL CALCULATED.3IONS-SCNC: 11.5 MMOL/L (ref 5–15)
BUN BLD-MCNC: 23 MG/DL (ref 8–23)
BUN/CREAT SERPL: 20 (ref 7–25)
CALCIUM SPEC-SCNC: 9.3 MG/DL (ref 8.6–10.5)
CHLORIDE SERPL-SCNC: 83 MMOL/L (ref 98–107)
CO2 SERPL-SCNC: 36.5 MMOL/L (ref 22–29)
CREAT BLD-MCNC: 1.15 MG/DL (ref 0.57–1)
GFR SERPL CREATININE-BSD FRML MDRD: 45 ML/MIN/1.73
GLUCOSE BLD-MCNC: 100 MG/DL (ref 65–99)
POTASSIUM BLD-SCNC: 3.2 MMOL/L (ref 3.5–5.2)
POTASSIUM BLD-SCNC: 4.5 MMOL/L (ref 3.5–5.2)
SODIUM BLD-SCNC: 131 MMOL/L (ref 136–145)

## 2020-05-10 PROCEDURE — 80048 BASIC METABOLIC PNL TOTAL CA: CPT | Performed by: INTERNAL MEDICINE

## 2020-05-10 PROCEDURE — 84132 ASSAY OF SERUM POTASSIUM: CPT | Performed by: HOSPITALIST

## 2020-05-10 PROCEDURE — 93005 ELECTROCARDIOGRAM TRACING: CPT | Performed by: INTERNAL MEDICINE

## 2020-05-10 PROCEDURE — 99232 SBSQ HOSP IP/OBS MODERATE 35: CPT | Performed by: INTERNAL MEDICINE

## 2020-05-10 PROCEDURE — 93010 ELECTROCARDIOGRAM REPORT: CPT | Performed by: INTERNAL MEDICINE

## 2020-05-10 RX ORDER — POLYETHYLENE GLYCOL 3350 17 G/17G
17 POWDER, FOR SOLUTION ORAL DAILY
Status: DISCONTINUED | OUTPATIENT
Start: 2020-05-10 | End: 2020-05-13 | Stop reason: HOSPADM

## 2020-05-10 RX ADMIN — GABAPENTIN 100 MG: 100 CAPSULE ORAL at 22:42

## 2020-05-10 RX ADMIN — CARBIDOPA AND LEVODOPA 1.5 TABLET: 25; 100 TABLET ORAL at 22:41

## 2020-05-10 RX ADMIN — ATENOLOL 25 MG: 25 TABLET ORAL at 22:42

## 2020-05-10 RX ADMIN — PANTOPRAZOLE SODIUM 40 MG: 40 TABLET, DELAYED RELEASE ORAL at 06:49

## 2020-05-10 RX ADMIN — APIXABAN 2.5 MG: 2.5 TABLET, FILM COATED ORAL at 09:18

## 2020-05-10 RX ADMIN — CARBIDOPA AND LEVODOPA 2 TABLET: 25; 100 TABLET ORAL at 06:49

## 2020-05-10 RX ADMIN — SODIUM CHLORIDE, PRESERVATIVE FREE 10 ML: 5 INJECTION INTRAVENOUS at 22:41

## 2020-05-10 RX ADMIN — CARBIDOPA AND LEVODOPA 1.5 TABLET: 25; 100 TABLET ORAL at 09:18

## 2020-05-10 RX ADMIN — POLYETHYLENE GLYCOL 3350 17 G: 17 POWDER, FOR SOLUTION ORAL at 15:12

## 2020-05-10 RX ADMIN — SODIUM CHLORIDE, PRESERVATIVE FREE 10 ML: 5 INJECTION INTRAVENOUS at 09:20

## 2020-05-10 RX ADMIN — DOCUSATE SODIUM 50MG AND SENNOSIDES 8.6MG 2 TABLET: 8.6; 5 TABLET, FILM COATED ORAL at 09:19

## 2020-05-10 RX ADMIN — CARBIDOPA AND LEVODOPA 1.5 TABLET: 25; 100 TABLET ORAL at 15:12

## 2020-05-10 RX ADMIN — DOCUSATE SODIUM 50MG AND SENNOSIDES 8.6MG 2 TABLET: 8.6; 5 TABLET, FILM COATED ORAL at 22:40

## 2020-05-10 RX ADMIN — POTASSIUM CHLORIDE 40 MEQ: 10 CAPSULE, COATED, EXTENDED RELEASE ORAL at 09:49

## 2020-05-10 RX ADMIN — POTASSIUM CHLORIDE 40 MEQ: 10 CAPSULE, COATED, EXTENDED RELEASE ORAL at 15:12

## 2020-05-10 RX ADMIN — APIXABAN 2.5 MG: 2.5 TABLET, FILM COATED ORAL at 22:41

## 2020-05-10 NOTE — PLAN OF CARE
I returned a call to Jonelle.  She notified me that the patient has converted to a normal sinus rhythm.

## 2020-05-10 NOTE — PLAN OF CARE
Problem: Patient Care Overview  Goal: Plan of Care Review  Outcome: Ongoing (interventions implemented as appropriate)  Flowsheets (Taken 5/10/2020 4782)  Progress: improving  Plan of Care Reviewed With: patient  Outcome Summary: Patient had a much more pleasant night tonight. Was able to sleep through a majority of the night without incident. Continues lasix with good urine output. Was changed to PO during dayshift. Currently working PT with improving strength and mobility. VSS. Will continue to monitor.  Goal: Individualization and Mutuality  Outcome: Ongoing (interventions implemented as appropriate)  Goal: Discharge Needs Assessment  Outcome: Ongoing (interventions implemented as appropriate)  Goal: Interprofessional Rounds/Family Conf  Outcome: Ongoing (interventions implemented as appropriate)     Problem: Cardiac: Heart Failure (Adult)  Goal: Signs and Symptoms of Listed Potential Problems Will be Absent, Minimized or Managed (Cardiac: Heart Failure)  Outcome: Ongoing (interventions implemented as appropriate)     Problem: Arrhythmia/Dysrhythmia (Symptomatic) (Adult)  Goal: Signs and Symptoms of Listed Potential Problems Will be Absent, Minimized or Managed (Arrhythmia/Dysrhythmia)  Outcome: Ongoing (interventions implemented as appropriate)

## 2020-05-10 NOTE — PROGRESS NOTES
Name: Gregoria Jimenez ADMIT: 2020   : 1934  PCP: Rosanne Bland MD    MRN: [id number] LOS: 3 days   AGE/SEX: 86 y.o. female  ROOM: [facility]     Subjective   Subjective   No complaints.  Seems better today.    Review of Systems     Objective   Objective   Vital Signs  Temp:  [97.2 °F (36.2 °C)-97.9 °F (36.6 °C)] 97.2 °F (36.2 °C)  Heart Rate:  [70-96] 86  Resp:  [18] 18  BP: ()/(42-89) 105/57  SpO2:  [90 %-99 %] 93 %  on  Flow (L/min):  [1-2] 1;   Device (Oxygen Therapy): nasal cannula  Body mass index is 19.54 kg/m².  Physical Exam   Constitutional: She appears well-developed and well-nourished. No distress.   HENT:   Head: Normocephalic and atraumatic.   Nose: Nose normal.   Mouth/Throat: Oropharynx is clear and moist.   Eyes: Right eye exhibits no discharge. Left eye exhibits no discharge.   Neck: Neck supple.   Cardiovascular: Normal rate and intact distal pulses. An irregularly irregular rhythm present.   Pulmonary/Chest: Effort normal. No respiratory distress. She has no rales.   Abdominal: Soft. Bowel sounds are normal. She exhibits no distension. There is no tenderness.   Musculoskeletal: Normal range of motion. She exhibits no edema or tenderness.   Neurological: She is alert.   Tremor noted.    Skin: Skin is warm and dry. She is not diaphoretic. No erythema.   Psychiatric: She has a normal mood and affect.   Nursing note and vitals reviewed.      Results Review:       I reviewed the patient's new clinical results.  Results from last 7 days   Lab Units 20  0446 20  0324 20  2305   WBC 10*3/mm3 8.59 9.36 14.05*   HEMOGLOBIN g/dL 11.5* 10.6* 10.7*   PLATELETS 10*3/mm3 223 209 227     Results from last 7 days   Lab Units 05/10/20  0346 20  1151 20  0446 20  0333 20  0324   SODIUM mmol/L 131*  --  131* 130* 129*   POTASSIUM mmol/L 3.2* 3.8 3.5 3.2* 3.9   CHLORIDE mmol/L 83*  --  87* 86* 87*   CO2 mmol/L 36.5*  --  30.4* 29.8* 23.9   BUN  mg/dL 23  --  25* 34* 38*   CREATININE mg/dL 1.15*  --  0.82 0.99 1.21*   GLUCOSE mg/dL 100*  --  134* 105* 142*   Estimated Creatinine Clearance: 29.5 mL/min (A) (by C-G formula based on SCr of 1.15 mg/dL (H)).  Results from last 7 days   Lab Units 05/06/20  2305   ALBUMIN g/dL 4.00   BILIRUBIN mg/dL 0.6   ALK PHOS U/L 96   AST (SGOT) U/L 60*   ALT (SGPT) U/L 12     Results from last 7 days   Lab Units 05/10/20  0346 05/09/20  0446 05/08/20  0333 05/07/20  0324 05/06/20  2305   CALCIUM mg/dL 9.3 9.0 8.5* 8.7 8.5*   ALBUMIN g/dL  --   --   --   --  4.00   MAGNESIUM mg/dL  --  1.9 1.9  --  2.0       No results found for: HGBA1C, POCGLU      apixaban 2.5 mg Oral Q12H   atenolol 25 mg Oral Q12H   carbidopa-levodopa 1.5 tablet Oral TID   carbidopa-levodopa 2 tablet Oral QAM   furosemide 40 mg Oral Daily   gabapentin 100 mg Oral Nightly   pantoprazole 40 mg Oral Q AM   sennosides-docusate 2 tablet Oral BID   sodium chloride 10 mL Intravenous Q12H      Diet Regular; Cardiac       Assessment/Plan     Active Hospital Problems    Diagnosis  POA   • **Diastolic CHF, acute (CMS/HCC) [I50.31]  Yes   • Acute UTI (urinary tract infection) [N39.0]  Yes   • New onset atrial fibrillation (CMS/HCC) [I48.91]  Yes   • 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     Acute diastolic CHF  -Weight down considerably again (?accuracy). BP low now  -RN held Lasix and atenolol today. May need to back off atenolol dose some, prob resume po lasix tomorrow    Acute hypoxic resp failure- due to above  -wean o2 as fred today    UTI-culture growing E. coli  -Completed Rocephin.  Doubt pneumonia, infiltrates on x-ray more likely secondary to the above issue    Atrial fibrillation  - Rate ctrl on atenolol and Eliquis    Disposition- Plan d/c to [facility] tomorrow if stable and BP  improved. Hopefully off o2.      Alli Anne MD  [facility]  05/10/20  11:37

## 2020-05-10 NOTE — PLAN OF CARE
Problem: Patient Care Overview  Goal: Plan of Care Review  Outcome: Ongoing (interventions implemented as appropriate)  Flowsheets (Taken 5/10/2020 6654)  Plan of Care Reviewed With: patient  Outcome Summary: Converted back to NSR spontaneously this afternoon. ECG confirmed. Walked in hallway and sat in chair. Hopeful dc tomorrow.     Problem: Patient Care Overview  Goal: Plan of Care Review  Outcome: Ongoing (interventions implemented as appropriate)  Flowsheets (Taken 5/10/2020 6147)  Plan of Care Reviewed With: patient  Outcome Summary: Converted back to NSR spontaneously this afternoon. ECG confirmed. Walked in hallway and sat in chair. Hopeful dc tomorrow.

## 2020-05-10 NOTE — PROGRESS NOTES
LOS: 3 days   Patient Care Team:  Rosanne Bland MD as PCP - General (Geriatric Medicine)    Chief Complaint: Follow-up for persistent atrial fibrillation with RVR, acute diastolic CHF, mitral regurgitation.    Interval History: No chest pain or shortness of breath.  She states that she just feels tired at times.    Vital Signs:  Temp:  [97.2 °F (36.2 °C)-97.9 °F (36.6 °C)] 97.4 °F (36.3 °C)  Heart Rate:  [] 100  Resp:  [18] 18  BP: ()/(42-89) 98/55    Intake/Output Summary (Last 24 hours) at 5/10/2020 1655  Last data filed at 5/10/2020 1207  Gross per 24 hour   Intake 1020 ml   Output 1700 ml   Net -680 ml       Physical Exam:   General Appearance:    No acute distress, alert and oriented x4   Lungs:    Clear to auscultation bilaterally    Heart:    Irregularly irregular rhythm with a normal rate.  II/VI SM throughout the precordium.   Abdomen:     Soft, non-tender, non-distended.    Extremities:   Moves all extremities well.  No clubbing, cyanosis, or edema.     Results Review:    Results from last 7 days   Lab Units 05/10/20  0346   SODIUM mmol/L 131*   POTASSIUM mmol/L 3.2*   CHLORIDE mmol/L 83*   CO2 mmol/L 36.5*   BUN mg/dL 23   CREATININE mg/dL 1.15*   GLUCOSE mg/dL 100*   CALCIUM mg/dL 9.3     Results from last 7 days   Lab Units 05/07/20  0324 05/07/20  0026 05/06/20  2305   TROPONIN T ng/mL 0.030 0.031* 0.035*     Results from last 7 days   Lab Units 05/09/20  0446   WBC 10*3/mm3 8.59   HEMOGLOBIN g/dL 11.5*   HEMATOCRIT % 33.8*   PLATELETS 10*3/mm3 223     Results from last 7 days   Lab Units 05/07/20  0324   INR  1.24*         Results from last 7 days   Lab Units 05/09/20  0446   MAGNESIUM mg/dL 1.9           I reviewed the patient's new clinical results.        Assessment:  1.  Persistent atrial fibrillation with rapid ventricular response, recently diagnosed  2.  Acute diastolic CHF  3.  Moderate to severe mitral regurgitation  4.  Elevated troponin, likely related to CHF and  nonischemic  5.  Chronic kidney disease  6.  Hyponatremia  7.  Anemia (chronic)  8.  Parkinson's disease    Plan:  -Continue atenolol 25 mg twice a day.  Rate is still occasionally high, although cannot go up on the beta-blocker further.      -She is hypotensive.  Lasix has been held, and her a.m. dose of atenolol was held.  This will need to be reevaluated tomorrow.  I will put hold parameters on the atenolol at this point.    -Continue Eliquis 2.5 mg bid.    -Would re-evaluate MR as an outpatient once the rate is controlled for some time.      Main Lawrence MD  05/10/20  16:55

## 2020-05-11 ENCOUNTER — APPOINTMENT (OUTPATIENT)
Dept: CARDIOLOGY | Facility: HOSPITAL | Age: 85
End: 2020-05-11

## 2020-05-11 ENCOUNTER — APPOINTMENT (OUTPATIENT)
Dept: CT IMAGING | Facility: HOSPITAL | Age: 85
End: 2020-05-11

## 2020-05-11 ENCOUNTER — APPOINTMENT (OUTPATIENT)
Dept: MRI IMAGING | Facility: HOSPITAL | Age: 85
End: 2020-05-11

## 2020-05-11 LAB
ANION GAP SERPL CALCULATED.3IONS-SCNC: 10.6 MMOL/L (ref 5–15)
BUN BLD-MCNC: 31 MG/DL (ref 8–23)
BUN/CREAT SERPL: 26.3 (ref 7–25)
CALCIUM SPEC-SCNC: 9.2 MG/DL (ref 8.6–10.5)
CHLORIDE SERPL-SCNC: 85 MMOL/L (ref 98–107)
CO2 SERPL-SCNC: 31.4 MMOL/L (ref 22–29)
CREAT BLD-MCNC: 1.18 MG/DL (ref 0.57–1)
GFR SERPL CREATININE-BSD FRML MDRD: 43 ML/MIN/1.73
GLUCOSE BLD-MCNC: 104 MG/DL (ref 65–99)
GLUCOSE BLDC GLUCOMTR-MCNC: 106 MG/DL (ref 70–130)
POTASSIUM BLD-SCNC: 3.9 MMOL/L (ref 3.5–5.2)
SODIUM BLD-SCNC: 127 MMOL/L (ref 136–145)

## 2020-05-11 PROCEDURE — 70450 CT HEAD/BRAIN W/O DYE: CPT

## 2020-05-11 PROCEDURE — 80048 BASIC METABOLIC PNL TOTAL CA: CPT | Performed by: INTERNAL MEDICINE

## 2020-05-11 PROCEDURE — 93880 EXTRACRANIAL BILAT STUDY: CPT

## 2020-05-11 PROCEDURE — 82962 GLUCOSE BLOOD TEST: CPT

## 2020-05-11 PROCEDURE — 99223 1ST HOSP IP/OBS HIGH 75: CPT | Performed by: NURSE PRACTITIONER

## 2020-05-11 PROCEDURE — 97116 GAIT TRAINING THERAPY: CPT

## 2020-05-11 PROCEDURE — 70551 MRI BRAIN STEM W/O DYE: CPT

## 2020-05-11 PROCEDURE — 99232 SBSQ HOSP IP/OBS MODERATE 35: CPT | Performed by: INTERNAL MEDICINE

## 2020-05-11 RX ORDER — SODIUM CHLORIDE 0.9 % (FLUSH) 0.9 %
10 SYRINGE (ML) INJECTION EVERY 12 HOURS SCHEDULED
Status: DISCONTINUED | OUTPATIENT
Start: 2020-05-11 | End: 2020-05-13 | Stop reason: HOSPADM

## 2020-05-11 RX ORDER — SODIUM CHLORIDE 0.9 % (FLUSH) 0.9 %
10 SYRINGE (ML) INJECTION AS NEEDED
Status: DISCONTINUED | OUTPATIENT
Start: 2020-05-11 | End: 2020-05-13 | Stop reason: HOSPADM

## 2020-05-11 RX ORDER — LOSARTAN POTASSIUM 25 MG/1
25 TABLET ORAL
Status: DISCONTINUED | OUTPATIENT
Start: 2020-05-11 | End: 2020-05-13 | Stop reason: HOSPADM

## 2020-05-11 RX ORDER — ATORVASTATIN CALCIUM 20 MG/1
40 TABLET, FILM COATED ORAL NIGHTLY
Status: DISCONTINUED | OUTPATIENT
Start: 2020-05-11 | End: 2020-05-13 | Stop reason: HOSPADM

## 2020-05-11 RX ADMIN — FUROSEMIDE 40 MG: 40 TABLET ORAL at 08:32

## 2020-05-11 RX ADMIN — DOCUSATE SODIUM 50MG AND SENNOSIDES 8.6MG 2 TABLET: 8.6; 5 TABLET, FILM COATED ORAL at 08:32

## 2020-05-11 RX ADMIN — APIXABAN 2.5 MG: 2.5 TABLET, FILM COATED ORAL at 22:25

## 2020-05-11 RX ADMIN — ATENOLOL 25 MG: 25 TABLET ORAL at 08:32

## 2020-05-11 RX ADMIN — SODIUM CHLORIDE, PRESERVATIVE FREE 10 ML: 5 INJECTION INTRAVENOUS at 08:31

## 2020-05-11 RX ADMIN — ATENOLOL 25 MG: 25 TABLET ORAL at 22:25

## 2020-05-11 RX ADMIN — CARBIDOPA AND LEVODOPA 2 TABLET: 25; 100 TABLET ORAL at 06:54

## 2020-05-11 RX ADMIN — CARBIDOPA AND LEVODOPA 1.5 TABLET: 25; 100 TABLET ORAL at 21:42

## 2020-05-11 RX ADMIN — SODIUM CHLORIDE, PRESERVATIVE FREE 10 ML: 5 INJECTION INTRAVENOUS at 21:42

## 2020-05-11 RX ADMIN — CARBIDOPA AND LEVODOPA 1.5 TABLET: 25; 100 TABLET ORAL at 08:31

## 2020-05-11 RX ADMIN — SODIUM CHLORIDE, PRESERVATIVE FREE 10 ML: 5 INJECTION INTRAVENOUS at 21:41

## 2020-05-11 RX ADMIN — APIXABAN 2.5 MG: 2.5 TABLET, FILM COATED ORAL at 08:31

## 2020-05-11 RX ADMIN — ATORVASTATIN CALCIUM 40 MG: 20 TABLET, FILM COATED ORAL at 21:41

## 2020-05-11 RX ADMIN — DOCUSATE SODIUM 50MG AND SENNOSIDES 8.6MG 2 TABLET: 8.6; 5 TABLET, FILM COATED ORAL at 21:41

## 2020-05-11 RX ADMIN — POLYETHYLENE GLYCOL 3350 17 G: 17 POWDER, FOR SOLUTION ORAL at 08:31

## 2020-05-11 RX ADMIN — LOSARTAN POTASSIUM 25 MG: 25 TABLET, FILM COATED ORAL at 12:02

## 2020-05-11 RX ADMIN — PANTOPRAZOLE SODIUM 40 MG: 40 TABLET, DELAYED RELEASE ORAL at 06:54

## 2020-05-11 RX ADMIN — GABAPENTIN 100 MG: 100 CAPSULE ORAL at 21:41

## 2020-05-11 NOTE — PROGRESS NOTES
Name: Gregoria Jimenez ADMIT: 2020   : 1934  PCP: Rosanne Bland MD    MRN: [id number] LOS: 4 days   AGE/SEX: 86 y.o. female  ROOM: [facility]     Subjective   Subjective   Patient upset over an episode earlier this morning where she felt like she was having speech difficulty.  She says she kept repeating the same thing over and over again and could not find her words.  She was talking to her sister on the phone when this happened.  I spoke with her sister who confirmed that the patient seemed very disoriented and confused though she was not sure if it was just confusion or really word finding difficulty.  The patient is demented and her sister has what sounds like pretty severe Parkinson's and has some difficulty speaking herself so it is difficult to get an accurate history of what happened.  On my evaluation of her she was not having any speech issues but was mostly just upset and anxious.    Converted to sinus rhythm overnight, now slightly bradycardic at times    Review of Systems     Objective   Objective   Vital Signs  Temp:  [97.8 °F (36.6 °C)-98.1 °F (36.7 °C)] 97.8 °F (36.6 °C)  Heart Rate:  [51-63] 63  Resp:  [18] 18  BP: (143-169)/(56-88) 145/57  SpO2:  [94 %] 94 %  on   ;   Device (Oxygen Therapy): room air  Body mass index is 19.54 kg/m².  Physical Exam   Constitutional: She appears well-developed and well-nourished. No distress.   HENT:   Head: Normocephalic and atraumatic.   Nose: Nose normal.   Mouth/Throat: Oropharynx is clear and moist.   Eyes: Right eye exhibits no discharge. Left eye exhibits no discharge.   Neck: Neck supple.   Cardiovascular: Normal rate, regular rhythm and intact distal pulses.   Pulmonary/Chest: Effort normal. No respiratory distress. She has no rales.   Abdominal: Soft. Bowel sounds are normal. She exhibits no distension. There is no tenderness.   Musculoskeletal: Normal range of motion. She exhibits no edema or tenderness.   Neurological: She is alert.  She has normal strength. No cranial nerve deficit or sensory deficit.    No pronator drift   Skin: Skin is warm and dry. She is not diaphoretic. No erythema.   Psychiatric: She has a normal mood and affect.   Nursing note and vitals reviewed.      Results Review:       I reviewed the patient's new clinical results.  Results from last 7 days   Lab Units 05/09/20  0446 05/07/20  0324 05/06/20  2305   WBC 10*3/mm3 8.59 9.36 14.05*   HEMOGLOBIN g/dL 11.5* 10.6* 10.7*   PLATELETS 10*3/mm3 223 209 227     Results from last 7 days   Lab Units 05/11/20  0316 05/10/20  1907 05/10/20  0346 05/09/20  1151 05/09/20  0446 05/08/20  0333   SODIUM mmol/L 127*  --  131*  --  131* 130*   POTASSIUM mmol/L 3.9 4.5 3.2* 3.8 3.5 3.2*   CHLORIDE mmol/L 85*  --  83*  --  87* 86*   CO2 mmol/L 31.4*  --  36.5*  --  30.4* 29.8*   BUN mg/dL 31*  --  23  --  25* 34*   CREATININE mg/dL 1.18*  --  1.15*  --  0.82 0.99   GLUCOSE mg/dL 104*  --  100*  --  134* 105*   Estimated Creatinine Clearance: 28.8 mL/min (A) (by C-G formula based on SCr of 1.18 mg/dL (H)).  Results from last 7 days   Lab Units 05/06/20  2305   ALBUMIN g/dL 4.00   BILIRUBIN mg/dL 0.6   ALK PHOS U/L 96   AST (SGOT) U/L 60*   ALT (SGPT) U/L 12     Results from last 7 days   Lab Units 05/11/20  0316 05/10/20  0346 05/09/20  0446 05/08/20  0333  05/06/20  2305   CALCIUM mg/dL 9.2 9.3 9.0 8.5*   < > 8.5*   ALBUMIN g/dL  --   --   --   --   --  4.00   MAGNESIUM mg/dL  --   --  1.9 1.9  --  2.0    < > = values in this interval not displayed.       No results found for: HGBA1C, POCGLU      apixaban 2.5 mg Oral Q12H   atenolol 25 mg Oral Q12H   carbidopa-levodopa 1.5 tablet Oral TID   carbidopa-levodopa 2 tablet Oral QAM   furosemide 40 mg Oral Daily   gabapentin 100 mg Oral Nightly   losartan 25 mg Oral Q24H   pantoprazole 40 mg Oral Q AM   polyethylene glycol 17 g Oral Daily   sennosides-docusate 2 tablet Oral BID   sodium chloride 10 mL Intravenous Q12H      Diet Regular;  Cardiac       Assessment/Plan     Active Hospital Problems    Diagnosis  POA   • **Diastolic CHF, acute (CMS/HCC) [I50.31]  Yes   • Acute UTI (urinary tract infection) [N39.0]  Yes   • New onset atrial fibrillation (CMS/HCC) [I48.91]  Yes   • 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     Questionable transient aphasia  -History difficult to obtain for reasons discussed above.  CT head ordered since she is on Eliquis and is negative.   -Will ask neurology to evaluate.  Patient may need MRI as well though she seems to be back to baseline now.  This may just be confusion related to her underlying dementia but difficult to rule any brief ischemic event out at this point.    Acute diastolic CHF, resolving  -Now on p.o. Lasix.  Medications adjusted by cardiology.  Monitor blood pressure    Hyponatremia, slightly worse today  -Recheck in a.m., may improve with reinitiation of Lasix    Acute hypoxic resp failure- due to above  -wean o2 as fred today    UTI-culture growing E. coli  -Completed Rocephin.  Doubt pneumonia, infiltrates on x-ray more likely secondary to the above issue    Atrial fibrillation, now sinus rhythm  - Continue atenolol and Eliquis    Disposition- Plan d/c to [facility] tomorrow if stable.  Discussed with sister by phone and with nursing.        Alli Anne MD  [facility]  05/11/20  14:20

## 2020-05-11 NOTE — PLAN OF CARE
Problem: Patient Care Overview  Goal: Plan of Care Review  Outcome: Ongoing (interventions implemented as appropriate)  Flowsheets (Taken 5/11/2020 2543)  Progress: no change  Plan of Care Reviewed With: patient  Outcome Summary: Pt a bit confused. Had episode of word finding difficulty while on phone with sister. CT and MRI done.  Neuro consult placed.  Up in chair.  Sinus shelley

## 2020-05-11 NOTE — CONSULTS
"Neurology Consult Note    Consult Date: 5/11/2020    Referring MD: Naun Sparks MD    Reason for Consult I have been asked to see the patient in neurological consultation to render advice and opinion regarding speech difficulty    Gregoria Jimenez is a 86 y.o.RH female with hypertension, Parkinson's disease (followed by Dr. Tad Bush at [facility]), arthritis, and recent diagnosis of atrial fibrillation on Eliquis 2.5 mg twice daily PTA who originally presented from assisted living 5/6 with shortness of breath and chest tightness felt related to A. fib and CHF.  COVID-19 testing has been negative.  Neurology was consulted today for episode of aphasia. The patient was on the phone with her sister when she developed word-finding difficulty which she reports lasted 20 to 30 minutes.  This was reported to Dr. Arrieta who called the patient's sister who confirmed an episode of word finding difficulty.  The patient states she is never had any previous spells of speech difficulty but she does describe a few recent episodes of visual hallucinations describing feeling like she was \"on the bottom and looking up.\" CT head showed no acute findings and was stable from CT head 5/6/2020.  Patient states she had previously been on aspirin 81 mg daily but she believes aspirin  was stopped when she was started on Eliquis.    Past Medical/Surgical Hx:  Past Medical History:   Diagnosis Date   • Arthritis    • Constipation    • Eye hemorrhage, right     BLOOD SHOT RIGHT EYE-05- EYE EXAM-DR. ROSALES INFORMED NO SIG OF INFECTION-PT MOST LIKELY HAPPENED FRO A SNEEZE OR COUGH-BLOOD VESSELS BROKEN   • GERD (gastroesophageal reflux disease)    • History of bronchiectasis     2015   • Hypertension    • Limited joint range of motion (ROM)     RIGHT KNEE   • Parkinson disease (CMS/HCC)    • Vitamin D deficiency      Past Surgical History:   Procedure Laterality Date   • CATARACT EXTRACTION W/ INTRAOCULAR LENS  IMPLANT, BILATERAL     • " COLONOSCOPY W/ BIOPSIES AND POLYPECTOMY      BENIGN   • KNEE ARTHROSCOPY     • DE TOTAL KNEE ARTHROPLASTY Left 2/15/2017    Procedure: LT TOTAL KNEE ARTHROPLASTY;  Surgeon: Gregory Carvajal MD;  Location: [facility] MAIN OR;  Service: Orthopedics   • TOTAL KNEE ARTHROPLASTY Right 5/15/2019    Procedure: RIGHT TOTAL KNEE ARTHROPLASTY;  Surgeon: Gregory Carvajal MD;  Location: [facility] MAIN OR;  Service: Orthopedics       Medications On Admission  Medications Prior to Admission   Medication Sig Dispense Refill Last Dose   • acetaminophen (TYLENOL) 325 MG tablet Take 1-2 tablets by mouth Every 4 (Four) Hours As Needed for moderate pain (4-6). 1 bottle 1 4/24/2019   • apixaban (ELIQUIS) 2.5 MG tablet tablet Take 2.5 mg by mouth 2 (Two) Times a Day.      • aspirin 81 MG chewable tablet Chew 81 mg Daily. HOLD PRIOR TO SURGERY   5/10/2019   • bisoprolol-hydrochlorothiazide (ZIAC) 5-6.25 MG per tablet Take 1 tablet by mouth Every Morning.   5/15/2019 at 0630   • carbidopa-levodopa (SINEMET)  MG per tablet Take 1.5 tablets by mouth 3 (Three) Times a Day.   5/14/2019 at 1800   • carbidopa-levodopa (SINEMET)  MG per tablet Take 2 tablets by mouth Every Morning.   5/15/2019 at 0630   • Cholecalciferol (VITAMIN D3) 68643 units tablet Take 1 tablet by mouth Every 14 (Fourteen) Days.   5/8/2019   • gabapentin (NEURONTIN) 100 MG capsule Take 100 mg by mouth Every Night.   5/14/2019 at 1800   • meloxicam (MOBIC) 15 MG tablet Take 15 mg by mouth Daily. HOLD PRIOR TO SURGERY   5/8/2019   • pantoprazole (PROTONIX) 20 MG EC tablet Take 20 mg by mouth Daily.   5/15/2019 at 0630   • sennosides-docusate sodium (SENOKOT-S) 8.6-50 MG tablet Take 2 tablets by mouth 2 (Two) Times a Day. 50 tablet 1 5/13/2019       Allergies:  Allergies   Allergen Reactions   • Sulfa Antibiotics Nausea Only       Social Hx:  Social History     Socioeconomic History   • Marital status: Single     Spouse name: Not on file   • Number of children: Not on file   •  "Years of education: Not on file   • Highest education level: Not on file   Tobacco Use   • Smoking status: Never Smoker   • Smokeless tobacco: Never Used   Substance and Sexual Activity   • Alcohol use: No   • Drug use: No   • Sexual activity: Defer       Family Hx:  Family History   Problem Relation Age of Onset   • Malig Hyperthermia Neg Hx    sister had PD    REVIEW OF SYSTEMS:   Constitutional: [No fevers, chills, sweats or weight loss/gain]   Eye: [No change in vision, double vision, or loss of vision]   HEENT: [No headaches, tenderness, dizziness, or tinnitus. Normal smell and taste. Normal swallowing]   Respiratory: [No coughing, wheezing] + SOA  Cardiovascular: [No Chest pain, palpitations, syncope, WALLER]   Gastrointestinal: [Normal bowel function. No nausea, vomiting, diarrhea]   Genitourinary: [Normal bladder function]   Musculoskeletal: [No trauma, joint or neck pain, myalgias, cramping or weakness]   Skin: [No itching, burning, pain, rashes, or birthmarks]   Endocrinology: [No heat or cold intolerance]   Psychiatric: [. No anxiety or depression] Poor sleep recently  Neurologic: [See HPI, above]         Exam    /57 (BP Location: Left arm, Patient Position: Sitting)   Pulse 63   Temp 97.8 °F (36.6 °C) (Oral)   Resp 18   Ht 165.1 cm (65\")   Wt 53.3 kg (117 lb 6.4 oz) Comment: nurse notified   SpO2 94%   BMI 19.54 kg/m²   General appearance: Well developed, well nourished,  alert and cooperative.   HEENT: Normocephalic.   Neck and spine: Normal range of motion. Normal alignment. No mass or tenderness.    Cardiac: Regular rate and rhythm. No murmurs.   Peripheral Vasculature: Radial pulses are equal and symmetric.  Chest Exam: Clear to auscultation bilaterally, no wheezes, no rhonchi.  Extremities: Normal, no edema.   Skin: No rashes or birthmarks.     Higher integrative function: Oriented to time, place, person. Impaired recent memory. Spontaneous speech, fund of vocabulary are normal.   CN II: " Normal visual fields.   CN III IV VI: Extraocular movements are full without nystagmus. Pupils are equal, round, and reactive to light.   CN V: Normal facial sensation.  CN VII: Facial movements are symmetric, no weakness.   CN VIII: Auditory acuity is decreased to finger rub bilaterally.  CN IX & X: Symmetric palatal movement.   CN XI: Sternocleidomastoid and trapezius are normal. No weakness.   CN XII: The tongue is midline. No atrophy or fasciculations.   Motor: Masked face with decreased blink rate. Normal muscle strength, bulk, and tone in upper and lower extremities. No significant rigidity or resting tremor.  Sensation: Normal light touch.  Station and gait: Uses walker, reduced step length with posture flexed forward at the hips.  Muscle stretch reflexes: Plantar reflexes are flexor bilaterally.   Coordination: Finger to nose test showed no dysmetria.   NIH SS= 0    DATA:    Lab Results   Component Value Date    GLUCOSE 104 (H) 05/11/2020    CALCIUM 9.2 05/11/2020     (L) 05/11/2020    K 3.9 05/11/2020    CO2 31.4 (H) 05/11/2020    CL 85 (L) 05/11/2020    BUN 31 (H) 05/11/2020    CREATININE 1.18 (H) 05/11/2020    EGFRIFNONA 43 (L) 05/11/2020    BCR 26.3 (H) 05/11/2020    ANIONGAP 10.6 05/11/2020     Lab Results   Component Value Date    WBC 8.59 05/09/2020    HGB 11.5 (L) 05/09/2020    HCT 33.8 (L) 05/09/2020    MCV 88.3 05/09/2020     05/09/2020     No results found for: CHOL  No results found for: HDL  No results found for: LDL  No results found for: TRIG  No results found for: HGBA1C  Lab Results   Component Value Date    INR 1.24 (H) 05/07/2020    PROTIME 15.3 (H) 05/07/2020     Lab review: UA 5/6 showed 1+ bacteria, 31-50 white blood cells, moderate leukocytes, and positive nitrites.  Urine culture showed E. coli.  Imaging review: CT head images viewed by me, no acute findings  CT HEAD WITHOUT CONTRAST     HISTORY: Focal neuro deficit, new, altered mental status, confusion,  weakness.      COMPARISON: CT head 05/06/2020.     A noncontrasted CT examination of brain was performed.     FINDINGS: The brain and ventricles are symmetrical. Moderate vascular  calcification involving the carotid siphons are noted. There is no  evidence of hemorrhage, hydrocephalus or of a focal area of decreased  attenuation to suggest acute infarction. Areas of decreased attenuation  involving the white matter cerebral hemispheres are noted bilaterally  suggesting mild-to-moderate small vessel ischemic disease. This appears  similar to the prior examination.     IMPRESSION:  No evidence of acute infarction or hemorrhage. Vascular  calcification and mild to moderate small vessel ischemic disease is  noted. Further evaluation could be performed with MRI examination of  brain as indicated.           Radiation dose reduction techniques were utilized, including automated  exposure control and exposure modulation based on body size.      Echocardiogram Findings     Left Ventricle Calculated EF = 56.0%. Estimated EF was in disagreement with the calculated EF. Estimated EF appears to be in the range of 61 - 65%. Normal left ventricular cavity size noted. All left ventricular wall segments contract normally. Left ventricular wall thickness is consistent with borderline concentric hypertrophy. Left ventricular diastolic function was indeterminate. Elevated left atrial pressure.   Right Ventricle Normal right ventricular cavity size, wall thickness, systolic function and septal motion noted.   Left Atrium Left atrial cavity size is moderately dilated. The left atrium is elongated and the left atrial volume is underestimated. Saline test results are negative.   Right Atrium Right atrial cavity size is moderately dilated. The inferior vena cava is dilated. Normal IVC inspiratory collapse of greater than 50% noted.   Aortic Valve The valve appears trileaflet. The aortic valve is abnormal in structure. There is moderate calcification of  the aortic valve.No aortic valve regurgitation is present. No aortic valve stenosis is present. Aortic valve dimensionless index is 0.8.   Mitral Valve The mitral valve is abnormal in structure. There is mild mitral valve prolapse of the anterior mitral leaflet. Moderate-to-severe mitral valve regurgitation is present with a posteriorly-directed jet noted. No significant mitral valve stenosis is present.   Tricuspid Valve The tricuspid valve is grossly normal. Mild to moderate tricuspid valve regurgitation is present. Estimated right ventricular systolic pressure from tricuspid regurgitation is moderately elevated (45-55 mmHg). Calculated right ventricular systolic pressure from tricuspid regurgitation is 51 mmHg.   Pulmonic Valve The pulmonic valve is grossly normal in structure. There is trace pulmonic valve regurgitation present.   Greater Vessels No dilation of the aortic root is present. No dilation of the proximal aorta is present. The aortic arch not well visualized.   Pericardium There is no evidence of pericardial effusion. There is evidence of a fat pad present.         Impression:  1) episode of word-finding difficulty, suspect TIA  2) Parkinson's  3) Afib      PLAN:   Patient seen with Dr Aguero.  Check MRI brain w/o, carotid u/s  Continue Eliquis 2.5 greta BID  Continue home dose of sinemet.  Will follow.

## 2020-05-11 NOTE — PLAN OF CARE
Problem: Patient Care Overview  Goal: Plan of Care Review  Outcome: Ongoing (interventions implemented as appropriate)  Flowsheets (Taken 5/11/2020 2002)  Progress: improving  Plan of Care Reviewed With: patient  Outcome Summary: Pt significantly increased her ambulation distance today. Her gait pattern was improved with use of heel lift and shoes. She required a RWx and CGA for 350'. Pt is having some cognitive changes, which were witnessed during our visit today. She will continue to benefit from PT duirng her hospital stay.  Patient was wearing a face mask during this therapy encounter. Therapist used appropriate personal protective equipment including mask and gloves.  Mask used was standard procedure mask. Appropriate PPE was worn during the entire therapy session. Hand hygiene was completed before and after therapy session. Patient is not in enhanced droplet precautions.

## 2020-05-11 NOTE — PROGRESS NOTES
Continued Stay Note  [facility]     Patient Name: Gregoria Jimenez  MRN: [id number]  Today's Date: 5/11/2020    Admit Date: 5/6/2020    Discharge Plan     Row Name 05/11/20 1255       Plan    Plan  Return to [facility] at [facility]     Patient/Family in Agreement with Plan  yes    Plan Comments  Patient may discharge tomorrow.   I called Brenda from [place] and she is ok to return to AL, and would like the doctor to put in a order to continue PT so alejandro PT can continue working with her when she gets home.          Discharge Codes    No documentation.             Shannon Epley, RN

## 2020-05-11 NOTE — PLAN OF CARE
Problem: Patient Care Overview  Goal: Plan of Care Review  Outcome: Ongoing (interventions implemented as appropriate)  Flowsheets (Taken 5/11/2020 1505)  Progress: improving  Plan of Care Reviewed With: patient  Outcome Summary: Telemetry SB 50's 60's, no c/o's of pain or discomfort, [place] and slightly forgetful. Safety maintained.

## 2020-05-11 NOTE — NURSING NOTE
@1639 call from Radiologist Dr. Anderson (pt still at MRI): 1.5 cm acute stroke left frontal lobe laterally.  Was asked to call Dr. Aguero    @1643 call to Neurology answering service (Ava)    @1648 return call from Dr. Aguero, told of above; states to keep her on eliquis as ordered    @1708 report to [facility] for room 530, told that pt has new order for [facility] and that will need to be done when pt is back from MRI, and pt is going directly to new room and not coming back here after MRI   (Manager Theresa Currie RN directed me to put in transfer order to stroke unit)

## 2020-05-11 NOTE — THERAPY TREATMENT NOTE
Patient Name: Gregoria Jimenez  : 1934    MRN: [id number]                              Today's Date: 2020       Admit Date: 2020    Visit Dx:     ICD-10-CM ICD-9-CM   1. New onset atrial fibrillation (CMS/HCC) I48.91 427.31   2. Acute congestive heart failure, unspecified heart failure type (CMS/HCC) I50.9 428.0   3. Acute UTI N39.0 599.0   4. Altered mental status, unspecified altered mental status type (Nursing home reported) R41.82 780.97     Patient Active Problem List   Diagnosis   • Osteoarthritis of left knee   • Osteoarthritis of right knee   • New onset atrial fibrillation (CMS/HCC)   • Diastolic CHF, acute (CMS/HCC)   • Parkinson's disease dementia (CMS/HCC)   • GERD (gastroesophageal reflux disease)   • Hyponatremia   • Acute UTI (urinary tract infection)     Past Medical History:   Diagnosis Date   • Arthritis    • Constipation    • Eye hemorrhage, right     BLOOD SHOT RIGHT EYE-2019 EYE EXAM-DR. ROSALES INFORMED NO SIG OF INFECTION-PT MOST LIKELY HAPPENED FRO A SNEEZE OR COUGH-BLOOD VESSELS BROKEN   • GERD (gastroesophageal reflux disease)    • History of bronchiectasis        • Hypertension    • Limited joint range of motion (ROM)     RIGHT KNEE   • Parkinson disease (CMS/HCC)    • Vitamin D deficiency      Past Surgical History:   Procedure Laterality Date   • CATARACT EXTRACTION W/ INTRAOCULAR LENS  IMPLANT, BILATERAL     • COLONOSCOPY W/ BIOPSIES AND POLYPECTOMY      BENIGN   • KNEE ARTHROSCOPY     • ID TOTAL KNEE ARTHROPLASTY Left 2/15/2017    Procedure: LT TOTAL KNEE ARTHROPLASTY;  Surgeon: Gregory Carvajal MD;  Location: [facility];  Service: Orthopedics   • TOTAL KNEE ARTHROPLASTY Right 5/15/2019    Procedure: RIGHT TOTAL KNEE ARTHROPLASTY;  Surgeon: Gregory Carvajal MD;  Location: [facility];  Service: Orthopedics     General Information     Row Name 20 1455          PT Evaluation Time/Intention    Document Type  therapy note (daily note)  -LC     Mode of Treatment   physical therapy  -     Row Name 05/11/20 1455          General Information    Patient Profile Reviewed?  yes  -     Existing Precautions/Restrictions  no known precautions/restrictions  -     Row Name 05/11/20 1455          Cognitive Assessment/Intervention- PT/OT    Orientation Status (Cognition)  oriented x 3  -     Cognitive Assessment/Intervention Comment  Pt agreeable to work with PT. She states she is having some issues repeating herself. It happened when she was on the phone with her sister.  -       User Key  (r) = Recorded By, (t) = Taken By, (c) = Cosigned By    Initials Name Provider Type    Jennifer Mayfield PT Physical Therapist        Mobility     Row Name 05/11/20 1456          Bed Mobility Assessment/Treatment    Bed Mobility Assessment/Treatment  supine-sit  -     Supine-Sit [place] (Bed Mobility)  conditional independence  -     Assistive Device (Bed Mobility)  bed rails;head of bed elevated  -     Row Name 05/11/20 1456          Transfer Assessment/Treatment    Comment (Transfers)  Pt able to get into standing on first try today  -     Row Name 05/11/20 1456          Sit-Stand Transfer    Sit-Stand [place] (Transfers)  stand by assist  -     Assistive Device (Sit-Stand Transfers)  walker, front-wheeled  -     Row Name 05/11/20 1456          Gait/Stairs Assessment/Training    Gait/Stairs Assessment/Training  gait/ambulation independence;gait/ambulation assistive device  -     [place] Level (Gait)  contact guard  -     Assistive Device (Gait)  walker, front-wheeled  -     Distance in Feet (Gait)  350'  -     Pattern (Gait)  step-through  -     Right Sided Gait Deviations  forward flexed posture  -     Comment (Gait/Stairs)  Pt had her heel lift in her shoes today so her gait was significantly improved.   -       User Key  (r) = Recorded By, (t) = Taken By, (c) = Cosigned By    Initials Name Provider Type    Jennifer Mayfield PT Physical Therapist         Obj/Interventions     Row Name 05/11/20 1458          Therapeutic Exercise    Comment (Therapeutic Exercise)  After walk, neurologist came in so we did not perform therex today  -       User Key  (r) = Recorded By, (t) = Taken By, (c) = Cosigned By    Initials Name Provider Type    Jennifer Mayfield, PT Physical Therapist        Goals/Plan    No documentation.       Clinical Impression     Row Name 05/11/20 1459          Plan of Care Review    Plan of Care Reviewed With  patient  -     Progress  improving  -     Outcome Summary  Pt significantly increased her ambulation distance today. Her gait pattern was improved with use of heel lift and shoes. She required a RWx and CGA for 350'. Pt is having some cognitive changes, which were witnessed during our visit today. She will continue to benefit from PT duirng her hospital stay.  -     Row Name 05/11/20 1459          Physical Therapy Clinical Impression    Criteria for Skilled Interventions Met (PT Clinical Impression)  yes;treatment indicated  -     Rehab Potential (PT Clinical Summary)  good, to achieve stated therapy goals  -     Row Name 05/11/20 1459          Vital Signs    O2 Delivery Pre Treatment  room air  -     O2 Delivery Intra Treatment  room air  -     O2 Delivery Post Treatment  room air  -     Row Name 05/11/20 1459          Positioning and Restraints    Pre-Treatment Position  in bed  -     Post Treatment Position  chair  -     In Chair  notified nsg;sitting;call light within reach;encouraged to call for assist;exit alarm on  -       User Key  (r) = Recorded By, (t) = Taken By, (c) = Cosigned By    Initials Name Provider Type    Jennifer Mayfield, RONIT Physical Therapist        Outcome Measures     Row Name 05/11/20 0576          How much help from another person do you currently need...    Turning from your back to your side while in flat bed without using bedrails?  3  -LC     Moving from lying on back to sitting on the side of a  flat bed without bedrails?  3  -LC     Moving to and from a bed to a chair (including a wheelchair)?  3  -LC     Standing up from a chair using your arms (e.g., wheelchair, bedside chair)?  3  -LC     Climbing 3-5 steps with a railing?  2  -LC     To walk in hospital room?  3  -LC     AM-PAC 6 Clicks Score (PT)  17  -LC       User Key  (r) = Recorded By, (t) = Taken By, (c) = Cosigned By    Initials Name Provider Type    Jennifer Mayfield PT Physical Therapist        Physical Therapy Education                 Title: PT OT SLP Therapies (Done)     Topic: Physical Therapy (Done)     Point: Mobility training (Done)     Description:   Instruct learner(s) on safety and technique for assisting patient out of bed, chair or wheelchair.  Instruct in the proper use of assistive devices, such as walker, crutches, cane or brace.              Patient Friendly Description:   It's important to get you on your feet again, but we need to do so in a way that is safe for you. Falling has serious consequences, and your personal safety is the most important thing of all.        When it's time to get out of bed, one of us or a family member will sit next to you on the bed to give you support.     If your doctor or nurse tells you to use a walker, crutches, a cane, or a brace, be sure you use it every time you get out of bed, even if you think you don't need it.    Learning Progress Summary           Patient Acceptance, E,TB, VU by  at 5/11/2020 1501    Acceptance, E,TB, VU,NR by  at 5/9/2020 1433                   Point: Body mechanics (Done)     Description:   Instruct learner(s) on proper positioning and spine alignment for patient and/or caregiver during mobility tasks and/or exercises.              Learning Progress Summary           Patient Acceptance, E,TB, VU by  at 5/11/2020 1501    Acceptance, E,TB, VU,NR by  at 5/9/2020 1433                   Point: Precautions (Done)     Description:   Instruct learner(s) on prescribed  precautions during mobility and gait tasks              Learning Progress Summary           Patient Acceptance, E,TB, VU by  at 5/11/2020 1501    Acceptance, E,TB, VU,NR by  at 5/9/2020 1433                               User Key     Initials Effective Dates Name Provider Type Discipline     08/23/19 -  Jennifer King, PT Physical Therapist PT              PT Recommendation and Plan  Planned Therapy Interventions (PT Eval): balance training, bed mobility training, gait training, home exercise program, strengthening, patient/family education, neuromuscular re-education, transfer training  Outcome Summary/Treatment Plan (PT)  Anticipated Discharge Disposition (PT): home, assisted living facility ([facility]), anticipate therapy at next level of care  Plan of Care Reviewed With: patient  Progress: improving  Outcome Summary: Pt significantly increased her ambulation distance today. Her gait pattern was improved with use of heel lift and shoes. She required a RWx and CGA for 350'. Pt is having some cognitive changes, which were witnessed during our visit today. She will continue to benefit from PT duirng her hospital stay.     Time Calculation:   PT Charges     Row Name 05/11/20 1502             Time Calculation    Start Time  1436  -      Stop Time  1454  -      Time Calculation (min)  18 min  -      PT Received On  05/11/20  -      PT - Next Appointment  05/13/20  -        User Key  (r) = Recorded By, (t) = Taken By, (c) = Cosigned By    Initials Name Provider Type     Jennifer King, PT Physical Therapist        Therapy Charges for Today     Code Description Service Date Service Provider Modifiers Qty    [id number] HC GAIT TRAINING EA 15 MIN 5/11/2020 Jennifer King, PT GP 1          PT G-Codes  Outcome Measure Options: AM-PAC 6 Clicks Basic Mobility (PT)  AM-PAC 6 Clicks Score (PT): 17    Jennifer King PT  5/11/2020

## 2020-05-11 NOTE — PROGRESS NOTES
LOS: 4 days   Patient Care Team:  Rosanne Bland MD as PCP - General (Geriatric Medicine)    Chief Complaint:     F/u a fib, chf, MR    Interval History:     She converted to normal sinus rhythm from atrial fibrillation on 5/10/2020 at 1:28 PM.  At this time she is mildly confused but this seems to be the baseline per nursing.  She denies chest pain or difficulty breathing.  She denies nausea or vomiting.      Objective   Vital Signs  Temp:  [97.4 °F (36.3 °C)-98.1 °F (36.7 °C)] 98.1 °F (36.7 °C)  Heart Rate:  [] 54  Resp:  [18] 18  BP: ()/(42-74) 143/60    Intake/Output Summary (Last 24 hours) at 5/11/2020 0854  Last data filed at 5/11/2020 0530  Gross per 24 hour   Intake 1140 ml   Output 700 ml   Net 440 ml       Comfortable NAD  conjunctivae clear  Neck supple, no JVD or thyromegaly appreciated  S1/S2 RRR, no r/g, 2/6 hsm at apex  Lungs CTA B with fine rales at the right base, normal effort  Abdomen S/NT/ND (+) BS, no HSM appreciated  Extremities warm, no clubbing, cyanosis, or edema  No visible or palpable skin lesions  A/Ox4, confused and slightly upset.    Results Review:      Results from last 7 days   Lab Units 05/11/20  0316 05/10/20  1907 05/10/20  0346  05/09/20  0446   SODIUM mmol/L 127*  --  131*  --  131*   POTASSIUM mmol/L 3.9 4.5 3.2*   < > 3.5   CHLORIDE mmol/L 85*  --  83*  --  87*   CO2 mmol/L 31.4*  --  36.5*  --  30.4*   BUN mg/dL 31*  --  23  --  25*   CREATININE mg/dL 1.18*  --  1.15*  --  0.82   GLUCOSE mg/dL 104*  --  100*  --  134*   CALCIUM mg/dL 9.2  --  9.3  --  9.0    < > = values in this interval not displayed.     Results from last 7 days   Lab Units 05/07/20  0324 05/07/20  0026 05/06/20  2305   TROPONIN T ng/mL 0.030 0.031* 0.035*     Results from last 7 days   Lab Units 05/09/20  0446 05/07/20  0324 05/06/20  2305   WBC 10*3/mm3 8.59 9.36 14.05*   HEMOGLOBIN g/dL 11.5* 10.6* 10.7*   HEMATOCRIT % 33.8* 31.4* 31.3*   PLATELETS 10*3/mm3 223 209 227      Results from last 7 days   Lab Units 05/07/20  0324   INR  1.24*         Results from last 7 days   Lab Units 05/09/20  0446   MAGNESIUM mg/dL 1.9           I reviewed the patient's new clinical results.  I personally viewed and interpreted the patient's EKG/Telemetry data        Medication Review:     apixaban 2.5 mg Oral Q12H   atenolol 25 mg Oral Q12H   carbidopa-levodopa 1.5 tablet Oral TID   carbidopa-levodopa 2 tablet Oral QAM   furosemide 40 mg Oral Daily   gabapentin 100 mg Oral Nightly   pantoprazole 40 mg Oral Q AM   polyethylene glycol 17 g Oral Daily   sennosides-docusate 2 tablet Oral BID   sodium chloride 10 mL Intravenous Q12H            Assessment/Plan       Diastolic CHF, acute (CMS/HCC)    New onset atrial fibrillation (CMS/HCC)    Parkinson's disease dementia (CMS/HCC)    GERD (gastroesophageal reflux disease)    Hyponatremia    Acute UTI (urinary tract infection)    Assessment:  1.  Persistent atrial fibrillation with rapid ventricular response, now has SB. On apixaban.   2.  Acute diastolic CHF, resolved.    3.  MVP with Moderate to severe mitral regurgitation, possible reevaluation as outpt.   4.  Elevated troponin, likely related to CHF and nonischemic, her ECG shows no acute ECG changes. Echo shows normal LVEF 5/7/20.   5.  Chronic kidney disease  6.  Hyponatremia  7.  Anemia (chronic)  8.  Parkinson's disease  9. Hypotension so atenolol has been held at times. Now BP ok and even high, try low dose losartan.        Continue apixaban and current atenolol dosing.  Try low-dose losartan.  Could be dismissed soon.  I do not plan to make any changes.  We will see as needed.    F/u with her cardiologist at [facility].    Katie Segovia MD  05/11/20  08:54

## 2020-05-12 LAB
ANION GAP SERPL CALCULATED.3IONS-SCNC: 11.8 MMOL/L (ref 5–15)
BH CV XLRA MEAS LEFT DIST CCA EDV: 13 CM/SEC
BH CV XLRA MEAS LEFT DIST CCA PSV: 71.1 CM/SEC
BH CV XLRA MEAS LEFT DIST ICA EDV: -15.7 CM/SEC
BH CV XLRA MEAS LEFT DIST ICA PSV: -70.5 CM/SEC
BH CV XLRA MEAS LEFT MID ICA EDV: -17.3 CM/SEC
BH CV XLRA MEAS LEFT MID ICA PSV: -78.8 CM/SEC
BH CV XLRA MEAS LEFT PROX CCA EDV: 13 CM/SEC
BH CV XLRA MEAS LEFT PROX CCA PSV: 79.7 CM/SEC
BH CV XLRA MEAS LEFT PROX ECA EDV: -8.7 CM/SEC
BH CV XLRA MEAS LEFT PROX ECA PSV: -84 CM/SEC
BH CV XLRA MEAS LEFT PROX ICA EDV: -15.6 CM/SEC
BH CV XLRA MEAS LEFT PROX ICA PSV: -67.6 CM/SEC
BH CV XLRA MEAS LEFT PROX SCLA PSV: 127 CM/SEC
BH CV XLRA MEAS LEFT VERTEBRAL A EDV: -8.7 CM/SEC
BH CV XLRA MEAS LEFT VERTEBRAL A PSV: -54 CM/SEC
BH CV XLRA MEAS RIGHT DIST CCA EDV: -9.9 CM/SEC
BH CV XLRA MEAS RIGHT DIST CCA PSV: -68.3 CM/SEC
BH CV XLRA MEAS RIGHT DIST ICA EDV: -12.1 CM/SEC
BH CV XLRA MEAS RIGHT DIST ICA PSV: -75.4 CM/SEC
BH CV XLRA MEAS RIGHT MID ICA EDV: -11.8 CM/SEC
BH CV XLRA MEAS RIGHT MID ICA PSV: -67.7 CM/SEC
BH CV XLRA MEAS RIGHT PROX CCA EDV: 9.3 CM/SEC
BH CV XLRA MEAS RIGHT PROX CCA PSV: 80.8 CM/SEC
BH CV XLRA MEAS RIGHT PROX ECA EDV: -8.1 CM/SEC
BH CV XLRA MEAS RIGHT PROX ECA PSV: -59.6 CM/SEC
BH CV XLRA MEAS RIGHT PROX ICA EDV: -9.3 CM/SEC
BH CV XLRA MEAS RIGHT PROX ICA PSV: -60.3 CM/SEC
BH CV XLRA MEAS RIGHT PROX SCLA PSV: 109.2 CM/SEC
BH CV XLRA MEAS RIGHT VERTEBRAL A EDV: -9.9 CM/SEC
BH CV XLRA MEAS RIGHT VERTEBRAL A PSV: -55.3 CM/SEC
BUN BLD-MCNC: 34 MG/DL (ref 8–23)
BUN/CREAT SERPL: 26.8 (ref 7–25)
CALCIUM SPEC-SCNC: 9.1 MG/DL (ref 8.6–10.5)
CHLORIDE SERPL-SCNC: 89 MMOL/L (ref 98–107)
CHOLEST SERPL-MCNC: 139 MG/DL (ref 0–200)
CO2 SERPL-SCNC: 32.2 MMOL/L (ref 22–29)
CREAT BLD-MCNC: 1.27 MG/DL (ref 0.57–1)
GFR SERPL CREATININE-BSD FRML MDRD: 40 ML/MIN/1.73
GLUCOSE BLD-MCNC: 101 MG/DL (ref 65–99)
GLUCOSE BLDC GLUCOMTR-MCNC: 123 MG/DL (ref 70–130)
HBA1C MFR BLD: 6 % (ref 4.8–5.6)
HDLC SERPL-MCNC: 34 MG/DL (ref 40–60)
LDLC SERPL CALC-MCNC: 82 MG/DL (ref 0–100)
LDLC/HDLC SERPL: 2.41 {RATIO}
LEFT ARM BP: NORMAL MMHG
POTASSIUM BLD-SCNC: 3.8 MMOL/L (ref 3.5–5.2)
RIGHT ARM BP: NORMAL MMHG
SODIUM BLD-SCNC: 133 MMOL/L (ref 136–145)
TRIGL SERPL-MCNC: 116 MG/DL (ref 0–150)
VLDLC SERPL-MCNC: 23.2 MG/DL (ref 5–40)

## 2020-05-12 PROCEDURE — 80061 LIPID PANEL: CPT | Performed by: NURSE PRACTITIONER

## 2020-05-12 PROCEDURE — 97112 NEUROMUSCULAR REEDUCATION: CPT

## 2020-05-12 PROCEDURE — 83036 HEMOGLOBIN GLYCOSYLATED A1C: CPT | Performed by: NURSE PRACTITIONER

## 2020-05-12 PROCEDURE — 80048 BASIC METABOLIC PNL TOTAL CA: CPT | Performed by: HOSPITALIST

## 2020-05-12 PROCEDURE — 97165 OT EVAL LOW COMPLEX 30 MIN: CPT

## 2020-05-12 PROCEDURE — 92610 EVALUATE SWALLOWING FUNCTION: CPT

## 2020-05-12 PROCEDURE — 99231 SBSQ HOSP IP/OBS SF/LOW 25: CPT | Performed by: NURSE PRACTITIONER

## 2020-05-12 PROCEDURE — 82962 GLUCOSE BLOOD TEST: CPT

## 2020-05-12 RX ADMIN — ATORVASTATIN CALCIUM 40 MG: 20 TABLET, FILM COATED ORAL at 20:10

## 2020-05-12 RX ADMIN — SODIUM CHLORIDE, PRESERVATIVE FREE 10 ML: 5 INJECTION INTRAVENOUS at 08:19

## 2020-05-12 RX ADMIN — FUROSEMIDE 40 MG: 40 TABLET ORAL at 08:18

## 2020-05-12 RX ADMIN — APIXABAN 2.5 MG: 2.5 TABLET, FILM COATED ORAL at 20:10

## 2020-05-12 RX ADMIN — CARBIDOPA AND LEVODOPA 2 TABLET: 25; 100 TABLET ORAL at 06:09

## 2020-05-12 RX ADMIN — CARBIDOPA AND LEVODOPA 1.5 TABLET: 25; 100 TABLET ORAL at 08:19

## 2020-05-12 RX ADMIN — LOSARTAN POTASSIUM 25 MG: 25 TABLET, FILM COATED ORAL at 08:18

## 2020-05-12 RX ADMIN — SODIUM CHLORIDE, PRESERVATIVE FREE 10 ML: 5 INJECTION INTRAVENOUS at 20:11

## 2020-05-12 RX ADMIN — CARBIDOPA AND LEVODOPA 1.5 TABLET: 25; 100 TABLET ORAL at 20:11

## 2020-05-12 RX ADMIN — ATENOLOL 25 MG: 25 TABLET ORAL at 08:18

## 2020-05-12 RX ADMIN — POLYETHYLENE GLYCOL 3350 17 G: 17 POWDER, FOR SOLUTION ORAL at 08:18

## 2020-05-12 RX ADMIN — DOCUSATE SODIUM 50MG AND SENNOSIDES 8.6MG 2 TABLET: 8.6; 5 TABLET, FILM COATED ORAL at 20:10

## 2020-05-12 RX ADMIN — DOCUSATE SODIUM 50MG AND SENNOSIDES 8.6MG 2 TABLET: 8.6; 5 TABLET, FILM COATED ORAL at 08:19

## 2020-05-12 RX ADMIN — CARBIDOPA AND LEVODOPA 1.5 TABLET: 25; 100 TABLET ORAL at 16:52

## 2020-05-12 RX ADMIN — GABAPENTIN 100 MG: 100 CAPSULE ORAL at 20:10

## 2020-05-12 RX ADMIN — SODIUM CHLORIDE, PRESERVATIVE FREE 10 ML: 5 INJECTION INTRAVENOUS at 20:10

## 2020-05-12 RX ADMIN — APIXABAN 2.5 MG: 2.5 TABLET, FILM COATED ORAL at 08:18

## 2020-05-12 RX ADMIN — PANTOPRAZOLE SODIUM 40 MG: 40 TABLET, DELAYED RELEASE ORAL at 06:10

## 2020-05-12 RX ADMIN — ATENOLOL 25 MG: 25 TABLET ORAL at 20:10

## 2020-05-12 NOTE — PROGRESS NOTES
"DOS: 2020  NAME: Gregoria Jimenez   : 1934  PCP: Rosanne Bland MD  Chief Complaint   Patient presents with   • Altered Mental Status   • Atrial Fibrillation   • Rapid Heart Rate     Stroke    Subjective: C/O constipation. No further episodes of speech difficulty.     Objective:  Vital signs: /60 (BP Location: Left arm, Patient Position: Lying)   Pulse 53   Temp 97.5 °F (36.4 °C) (Oral)   Resp 18   Ht 165.1 cm (65\")   Wt 63.2 kg (139 lb 4.8 oz)   SpO2 93%   BMI 23.18 kg/m²       General appearance: Well developed, well nourished, alert and cooperative.   HEENT: Normocephalic.   Neck and spine: Normal range of motion. Normal alignment. No mass or tenderness.    Cardiac: Regular rate and rhythm.   Peripheral Vasculature: Radial pulses are equal and symmetric.  Chest Exam: Clear to auscultation bilaterally, no wheezes, no rhonchi.  Extremities: Normal, no edema.   Skin: No rashes or birthmarks.      Higher integrative function: Oriented to time, person. Not oriented to [facility], but know's she's in the hospital.  Impaired recent memory. Spontaneous speech, fund of vocabulary are normal.   CN II: Normal visual fields.   CN III IV VI: Extraocular movements are full without nystagmus. Pupils are equal, round, and reactive to light.   CN V: Normal facial sensation.  CN VII: Facial movements are symmetric, no weakness.   CN VIII: Auditory acuity is decreased to finger rub bilaterally.  CN IX & X: Symmetric palatal movement.   CN XI: Sternocleidomastoid and trapezius are normal. No weakness.   CN XII: The tongue is midline. No atrophy or fasciculations.   Motor: Masked face with decreased blink rate. Normal muscle strength. LUE resting tremor with mildly increased tone.  Sensation: Normal light touch.  Station and gait: n/a  Muscle stretch reflexes: Plantar reflexes are flexor bilaterally.   Coordination: Finger to nose test showed no dysmetria.   Patient reexamined, changes " noted.    Scheduled Meds:  apixaban 2.5 mg Oral Q12H   atenolol 25 mg Oral Q12H   atorvastatin 40 mg Oral Nightly   carbidopa-levodopa 1.5 tablet Oral TID   carbidopa-levodopa 2 tablet Oral QAM   furosemide 40 mg Oral Daily   gabapentin 100 mg Oral Nightly   losartan 25 mg Oral Q24H   pantoprazole 40 mg Oral Q AM   polyethylene glycol 17 g Oral Daily   sennosides-docusate 2 tablet Oral BID   sodium chloride 10 mL Intravenous Q12H   sodium chloride 10 mL Intravenous Q12H     Continuous Infusions:   PRN Meds:.•  acetaminophen **OR** [DISCONTINUED] acetaminophen **OR** [DISCONTINUED] acetaminophen  •  nitroglycerin  •  ondansetron  •  potassium chloride **OR** potassium chloride **OR** potassium chloride  •  [COMPLETED] Insert peripheral IV **AND** sodium chloride  •  sodium chloride  •  sodium chloride    Laboratory results:  Lab Results   Component Value Date    GLUCOSE 101 (H) 05/12/2020    CALCIUM 9.1 05/12/2020     (L) 05/12/2020    K 3.8 05/12/2020    CO2 32.2 (H) 05/12/2020    CL 89 (L) 05/12/2020    BUN 34 (H) 05/12/2020    CREATININE 1.27 (H) 05/12/2020    EGFRIFNONA 40 (L) 05/12/2020    BCR 26.8 (H) 05/12/2020    ANIONGAP 11.8 05/12/2020     Lab Results   Component Value Date    WBC 8.59 05/09/2020    HGB 11.5 (L) 05/09/2020    HCT 33.8 (L) 05/09/2020    MCV 88.3 05/09/2020     05/09/2020     Lab Results   Component Value Date    CHOL 139 05/12/2020     Lab Results   Component Value Date    HDL 34 (L) 05/12/2020     Lab Results   Component Value Date    LDL 82 05/12/2020     Lab Results   Component Value Date    TRIG 116 05/12/2020     @[email or web address]@   Review and interpretation of imaging:MRI brain images viewed by me, shows small left frontal cortical stroke.  MRI BRAIN WITHOUT CONTRAST     HISTORY: TIA.     COMPARISON: CT head 05/11/2020.     TECHNIQUE: A noncontrasted MRI examination of the brain was performed  utilizing sagittal T1, axial diffusion, T1, T2, T2 FLAIR and gradient  echo T2 imaging.      FINDINGS: The diffusion sequence demonstrates an area of restricted  diffusion involving the left frontal lobe laterally measuring  approximately 1.5 x 1.0 cm in size, consistent with a small acute  infarct. There is mild T2 FLAIR hyperintensity related to the cortex at  the same location. There is age-appropriate atrophy. Mild small vessel  ischemic disease is noted.     There is expected flow-void in the basilar artery and in the distal  aspect of the internal carotid arteries bilaterally on the axial T2  sequence.     IMPRESSION:  Acute cortical infarct involving the left frontal lobe  laterally measuring 1.5 x 1 cm in maximum transverse dimension. Atrophy  and mild to moderate small vessel ischemic disease is noted. The above  information was called to the patient's nurse at the time of the  dictation. The patient's nurse is to relay the information to the  clinical service.     This report was finalized on 5/12/2020 9:18 AM by Dr. Elvin Anderson M.D.    Interpretation Summary     · Proximal right internal carotid artery plaque without significant stenosis.  · Proximal left internal carotid artery plaque without significant stenosis.        Echocardiogram Findings     Left Ventricle Calculated EF = 56.0%. Estimated EF was in disagreement with the calculated EF. Estimated EF appears to be in the range of 61 - 65%. Normal left ventricular cavity size noted. All left ventricular wall segments contract normally. Left ventricular wall thickness is consistent with borderline concentric hypertrophy. Left ventricular diastolic function was indeterminate. Elevated left atrial pressure.   Right Ventricle Normal right ventricular cavity size, wall thickness, systolic function and septal motion noted.   Left Atrium Left atrial cavity size is moderately dilated. The left atrium is elongated and the left atrial volume is underestimated. Saline test results are negative.   Right Atrium Right atrial cavity size is moderately  dilated. The inferior vena cava is dilated. Normal IVC inspiratory collapse of greater than 50% noted.   Aortic Valve The valve appears trileaflet. The aortic valve is abnormal in structure. There is moderate calcification of the aortic valve.No aortic valve regurgitation is present. No aortic valve stenosis is present. Aortic valve dimensionless index is 0.8.   Mitral Valve The mitral valve is abnormal in structure. There is mild mitral valve prolapse of the anterior mitral leaflet. Moderate-to-severe mitral valve regurgitation is present with a posteriorly-directed jet noted. No significant mitral valve stenosis is present.   Tricuspid Valve The tricuspid valve is grossly normal. Mild to moderate tricuspid valve regurgitation is present. Estimated right ventricular systolic pressure from tricuspid regurgitation is moderately elevated (45-55 mmHg). Calculated right ventricular systolic pressure from tricuspid regurgitation is 51 mmHg.   Pulmonic Valve The pulmonic valve is grossly normal in structure. There is trace pulmonic valve regurgitation present.   Greater Vessels No dilation of the aortic root is present. No dilation of the proximal aorta is present. The aortic arch not well visualized.   Pericardium There is no evidence of pericardial effusion. There is evidence of a fat pad present.       Impression:  Patient is an 86-year-old right-handed female with hypertension, Parkinson's disease with dementia, arthritis, and recent diagnosis of atrial fibrillation on Eliquis 2.5 mg twice daily who originally presented from assisted living with shortness of breath and chest tightness felt secondary to A. fib and CHF.  Neurology was consulted 5/11 after patient had an episode of word finding difficulty while on the phone with her sister.  This lasted approximately 20 to 30 minutes.  MRI brain has shown a small left MCA stroke.    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  Carotid u/s 5/11: No significant stenosis  Echo: EF 56%, moderately dilated left atrial cavity size.  Saline test results are negative.  Labs: LDL 82, hgb a1c 6.0%    Plan:  Continue eliquis 2.5 mg BID, this is not felt to be NOAC failure  No need for antiplatelet  Lipitor 40 mg Daily added  PT/OT/ST  Can f/u with her neurologist as outpatient.   D/W Dr Aguero today. Will sign off, please call if further questions/concerns.

## 2020-05-12 NOTE — PLAN OF CARE
Problem: Patient Care Overview  Goal: Plan of Care Review  Flowsheets (Taken 5/12/2020 5797)  Plan of Care Reviewed With: patient  Note:   OT:  Pt. Was O x 3, but confused about why she was in the hospital.  Pt. States she lives in a Assisting Living Apartment but can not remember where it is or the name.  Pt. Has AROM WFLs with (B)UE but strength was not able to be formally tested because she was not able to follow the directions.  Pt. Was able to complete bed mobility and supine to sit on the EOB but required extra time to complete these tasks.  Pt. Was SBA with sit to stand and was only to stand for 1 1/2 minutes with SBA for safety.  Pt. Was able to sal/doff her zhou socks while sitting on the EOB.  Pt. Would benefit from skilled OT to improve functional standing balance to improve mobility status to complete ADLs.  Pt. And therapist worn mask during the treatment session.  Therapist wash her hands before and after the treatment session.

## 2020-05-12 NOTE — PLAN OF CARE
NIH 1, slurred speech noted. Sinus shelley on the monitor. No c/o soa or chest pain during the night  Problem: Cardiac: Heart Failure (Adult)  Goal: Signs and Symptoms of Listed Potential Problems Will be Absent, Minimized or Managed (Cardiac: Heart Failure)  Outcome: Ongoing (interventions implemented as appropriate)     Problem: Arrhythmia/Dysrhythmia (Symptomatic) (Adult)  Goal: Signs and Symptoms of Listed Potential Problems Will be Absent, Minimized or Managed (Arrhythmia/Dysrhythmia)  Outcome: Ongoing (interventions implemented as appropriate)     Problem: Urinary Tract Infection (Adult)  Goal: Signs and Symptoms of Listed Potential Problems Will be Absent, Minimized or Managed (Urinary Tract Infection)  Outcome: Ongoing (interventions implemented as appropriate)     Problem: Fall Risk (Adult)  Goal: Identify Related Risk Factors and Signs and Symptoms  Outcome: Ongoing (interventions implemented as appropriate)

## 2020-05-12 NOTE — THERAPY EVALUATION
Acute Care - Occupational Therapy Initial Evaluation  [facility]     Patient Name: Gregoria Jimenez  : 1934  MRN: [id number]  Today's Date: 2020             Admit Date: 2020       ICD-10-CM ICD-9-CM   1. New onset atrial fibrillation (CMS/HCC) I48.91 427.31   2. Acute congestive heart failure, unspecified heart failure type (CMS/HCC) I50.9 428.0   3. Acute UTI N39.0 599.0   4. Altered mental status, unspecified altered mental status type (Nursing home reported) R41.82 780.97     Patient Active Problem List   Diagnosis   • Osteoarthritis of left knee   • Osteoarthritis of right knee   • New onset atrial fibrillation (CMS/HCC)   • Diastolic CHF, acute (CMS/HCC)   • Parkinson's disease dementia (CMS/HCC)   • GERD (gastroesophageal reflux disease)   • Hyponatremia   • Acute UTI (urinary tract infection)     Past Medical History:   Diagnosis Date   • Arthritis    • Constipation    • Eye hemorrhage, right     BLOOD SHOT RIGHT EYE-2019 EYE EXAM-DR. ROSALES INFORMED NO SIG OF INFECTION-PT MOST LIKELY HAPPENED FRO A SNEEZE OR COUGH-BLOOD VESSELS BROKEN   • GERD (gastroesophageal reflux disease)    • History of bronchiectasis        • Hypertension    • Limited joint range of motion (ROM)     RIGHT KNEE   • Parkinson disease (CMS/HCC)    • Vitamin D deficiency      Past Surgical History:   Procedure Laterality Date   • CATARACT EXTRACTION W/ INTRAOCULAR LENS  IMPLANT, BILATERAL     • COLONOSCOPY W/ BIOPSIES AND POLYPECTOMY      BENIGN   • KNEE ARTHROSCOPY     • WI TOTAL KNEE ARTHROPLASTY Left 2/15/2017    Procedure: LT TOTAL KNEE ARTHROPLASTY;  Surgeon: Gregory Carvajal MD;  Location: [facility] OR;  Service: Orthopedics   • TOTAL KNEE ARTHROPLASTY Right 5/15/2019    Procedure: RIGHT TOTAL KNEE ARTHROPLASTY;  Surgeon: Gregory Carvajal MD;  Location: [facility] OR;  Service: Orthopedics          OT ASSESSMENT FLOWSHEET (last 12 hours)      Occupational Therapy Evaluation     Row Name 20 3815           "         OT Evaluation Time/Intention    Subjective Information  complains of;weakness  -VS        Document Type  evaluation  -VS        Mode of Treatment  occupational therapy  -VS        Patient Effort  adequate  -VS        Comment  \"I live in Assisted living but I do not know thw name of it\"   -VS           General Information    Patient Profile Reviewed?  yes  -VS        General Observations of Patient  Pt. was supine in bed when the OT entered the room   -VS        Equipment Currently Used at Home  rollator  -VS        Existing Precautions/Restrictions  fall  -VS           Cognitive Assessment/Intervention- PT/OT    Orientation Status (Cognition)  oriented to;person;place;time Pt. knew she was in a hospital but did not know thew name  -VS        Follows Commands (Cognition)  follows one step commands;increased processing time needed  -VS        Safety Deficit (Cognitive)  -- seemed confused regarding why she was in hospital   -VS           Bed Mobility Assessment/Treatment    Bed Mobility Assessment/Treatment  rolling left;rolling right;supine-sit;sit-supine  -VS        Rolling Left [place] (Bed Mobility)  conditional independence  -VS        Rolling Right [place] (Bed Mobility)  conditional independence  -VS        Supine-Sit [place] (Bed Mobility)  conditional independence  -VS        Sit-Supine [place] (Bed Mobility)  conditional independence  -VS        Comment (Bed Mobility)  Pt. required incxreased time to complete all task   -VS           Transfer Assessment/Treatment    Transfer Assessment/Treatment  sit-stand transfer;stand-sit transfer  -VS           Sit-Stand Transfer    Sit-Stand [place] (Transfers)  supervision;verbal cues;contact guard  -VS           Stand-Sit Transfer    Stand-Sit [place] (Transfers)  verbal cues;contact guard  -VS           ADL Assessment/Intervention    BADL Assessment/Intervention  lower body dressing  -VS           Lower Body Dressing " Assessment/Training    Lower Body Dressing [place] Level  doff;don;socks;supervision  -VS        Lower Body Dressing Position  edge of bed sitting  -VS           General ROM    GENERAL ROM COMMENTS  AROM with (BUEs WFLs   -VS           MMT (Manual Muscle Testing)    General MMT Comments  NA - Pt. was not able to understand MMT directions   -VS           Motor Assessment/Interventions    Additional Documentation  Balance (Group);Balance Interventions (Group);Functional Endurance Training (Group)  -VS           Balance    Balance  static standing balance  -VS           Static Standing Balance    Level of [place] (Supported Standing, Static Balance)  supervision  -VS        Time Able to Maintain Position (Supported Standing, Static Balance)  1 to 2 minutes  -VS        Comment (Supported Standing, Static Balance)  Standing at bedside   -VS           Functional Endurance Training    Comment, Functional Endurance  fair -   -VS           Positioning and Restraints    Pre-Treatment Position  in bed  -VS        Post Treatment Position  bed  -VS        In Bed  notified nsg;fowlers;call light within reach;encouraged to call for assist;exit alarm on  -VS           Pain Scale: Numbers Pre/Post-Treatment    Pain Scale: Numbers, Pretreatment  0/10 - no pain  -VS        Pain Scale: Numbers, Post-Treatment  0/10 - no pain  -VS           Plan of Care Review    Plan of Care Reviewed With  patient  -VS           Clinical Impression (OT)    OT Diagnosis  SBA with stattic standing, Mod (I) with BADLs skills   -VS        Criteria for Skilled Therapeutic Interventions Met (OT Eval)  treatment indicated  -VS        Rehab Potential (OT Eval)  good, to achieve stated therapy goals  -VS        Therapy Frequency (OT Eval)  3 times/wk  -VS        Care Plan Review (OT)  evaluation/treatment results reviewed;care plan/treatment goals reviewed;risks/benefits reviewed  -VS           Planned OT Interventions    Planned Therapy Interventions  (OT Eval)  functional balance retraining  -VS           OT Goals    Transfer Goal Selection (OT)  transfer, OT goal 1  -VS        Balance Goal Selection (OT)  balance, OT goal 1  -VS        Endurance Goal Selection (OT)  endurance, OT goal 1  -VS        Additional Documentation  Balance Goal Selection (OT) (Row);Endurance Goal Selection (OT) (Row)  -VS           Transfer Goal 1 (OT)    Activity/Assistive Device (Transfer Goal 1, OT)  transfers, all  -VS        [place] Level/Cues Needed (Transfer Goal 1, OT)  conditional independence  -VS        Time Frame (Transfer Goal 1, OT)  short term goal (STG);2 weeks  -VS        Progress/Outcome (Transfer Goal 1, OT)  continuing progress toward goal  -VS           Balance Goal 1 (OT)    Activity/Assistive Device (Balance Goal 1, OT)  -- Pt. to be able to complete functional static standing balanc  -VS        [place] Level/Cues Needed (Balance Goal 1, OT)  conditional independence;supervision required  -VS        Time Frame (Balance Goal 1, OT)  short term goal (STG);2 weeks  -VS        Progress/Outcomes (Balance Goal 1, OT)  continuing progress toward goal  -VS            Endurance Goal 1 (OT)    Activity Level (Endurance Goal 1, OT)  to increase;endurance 2 fair +  -VS        Time Frame (Endurance Goal 1, OT)  short term goal (STG);2 weeks  -VS        Progress/Outcome (Endurance Goal 1, OT)  continuing progress toward goal  -VS          User Key  (r) = Recorded By, (t) = Taken By, (c) = Cosigned By    Initials Name Effective Dates    VS Feli Palafox OTR 03/02/20 -          Occupational Therapy Education                 Title: PT OT SLP Therapies (Done)     Topic: Occupational Therapy (Done)     Point: ADL training (Done)     Description:   Instruct learner(s) on proper safety adaptation and remediation techniques during self care or transfers.   Instruct in proper use of assistive devices.              Learning Progress Summary           FLYNN Mcclelland  VU,NR by VS at 5/12/2020 1518    Comment:  Ed discussed the POC and goals with the pt.                               User Key     Initials Effective Dates Name Provider Type Discipline    VS 03/02/20 -  Feli Palafox OTR Occupational Therapist OT                  OT Recommendation and Plan  Outcome Summary/Treatment Plan (OT)  Anticipated Discharge Disposition (OT): assisted living facility (longterm)(with Home Health therapy )  Planned Therapy Interventions (OT Eval): functional balance retraining  Therapy Frequency (OT Eval): 3 times/wk  Plan of Care Review  Plan of Care Reviewed With: patient  Plan of Care Reviewed With: patient    Outcome Measures     Row Name 05/12/20 1500             How much help from another is currently needed...    Putting on and taking off regular lower body clothing?  3  -VS      Bathing (including washing, rinsing, and drying)  3  -VS      Toileting (which includes using toilet bed pan or urinal)  3  -VS      Putting on and taking off regular upper body clothing  3  -VS      Taking care of personal grooming (such as brushing teeth)  3  -VS      Eating meals  3  -VS      AM-PAC 6 Clicks Score (OT)  18  -VS         Modified Sol Scale    Modified Gates Scale  4 - Moderately severe disability.  Unable to walk without assistance, and unable to attend to own bodily needs without assistance.  -VS         Functional Assessment    Outcome Measure Options  AM-PAC 6 Clicks Daily Activity (OT);Modified Sol  -VS        User Key  (r) = Recorded By, (t) = Taken By, (c) = Cosigned By    Initials Name Provider Type    VS Feli Palafox OTR Occupational Therapist          Time Calculation:   Time Calculation- OT     Row Name 05/12/20 1526             Time Calculation- OT    OT Start Time  1250  -VS      OT Stop Time  1315  -VS      OT Time Calculation (min)  25 min  -VS      Total Timed Code Minutes- OT  15 minute(s)  -VS      OT Received On  05/12/20  -VS      OT - Next Appointment   05/14/20  -VS        User Key  (r) = Recorded By, (t) = Taken By, (c) = Cosigned By    Initials Name Provider Type    VS Feli Palafox OTR Occupational Therapist        Therapy Charges for Today     Code Description Service Date Service Provider Modifiers Qty    89308219111  OT EVAL LOW COMPLEXITY 2 5/12/2020 Feli Palafox OTR GO 1    [id number]  OT NEUROMUSC RE EDUCATION EA 15 MIN 5/12/2020 Feli Palafox OTR GO 1               YIN Maurer  5/12/2020

## 2020-05-12 NOTE — PLAN OF CARE
Problem: Patient Care Overview  Goal: Plan of Care Review  Outcome: Ongoing (interventions implemented as appropriate)  Flowsheets (Taken 5/12/2020 4451)  Progress: improving  Plan of Care Reviewed With: patient  Outcome Summary: Patient seen for clinical swallow assessment. No overt s/s of aspiration noted with liquid or solids. Voice clear post swallow. SLP discussed patient's baseline status with POA, who reports patient can carry on a simple conversation and is orientated at baseline. This date, patient with fluent speech, but unable to answer simple short answer questions. Will follow for SLE/SHANTANU as indicated.

## 2020-05-12 NOTE — THERAPY EVALUATION
Acute Care - Speech Language Pathology   Swallow Initial Evaluation [facility]     Patient Name: Gregoria Jimenez  : 1934  MRN: [id number]  Today's Date: 2020               Admit Date: 2020    Visit Dx:     ICD-10-CM ICD-9-CM   1. New onset atrial fibrillation (CMS/HCC) I48.91 427.31   2. Acute congestive heart failure, unspecified heart failure type (CMS/HCC) I50.9 428.0   3. Acute UTI N39.0 599.0   4. Altered mental status, unspecified altered mental status type (Nursing home reported) R41.82 780.97     Patient Active Problem List   Diagnosis   • Osteoarthritis of left knee   • Osteoarthritis of right knee   • New onset atrial fibrillation (CMS/HCC)   • Diastolic CHF, acute (CMS/HCC)   • Parkinson's disease dementia (CMS/HCC)   • GERD (gastroesophageal reflux disease)   • Hyponatremia   • Acute UTI (urinary tract infection)     Past Medical History:   Diagnosis Date   • Arthritis    • Constipation    • Eye hemorrhage, right     BLOOD SHOT RIGHT EYE-2019 EYE EXAM-DR. ROSALES INFORMED NO SIG OF INFECTION-PT MOST LIKELY HAPPENED FRO A SNEEZE OR COUGH-BLOOD VESSELS BROKEN   • GERD (gastroesophageal reflux disease)    • History of bronchiectasis        • Hypertension    • Limited joint range of motion (ROM)     RIGHT KNEE   • Parkinson disease (CMS/HCC)    • Vitamin D deficiency      Past Surgical History:   Procedure Laterality Date   • CATARACT EXTRACTION W/ INTRAOCULAR LENS  IMPLANT, BILATERAL     • COLONOSCOPY W/ BIOPSIES AND POLYPECTOMY      BENIGN   • KNEE ARTHROSCOPY     • ID TOTAL KNEE ARTHROPLASTY Left 2/15/2017    Procedure: LT TOTAL KNEE ARTHROPLASTY;  Surgeon: Gregory Carvajal MD;  Location: [facility] OR;  Service: Orthopedics   • TOTAL KNEE ARTHROPLASTY Right 5/15/2019    Procedure: RIGHT TOTAL KNEE ARTHROPLASTY;  Surgeon: Gregory Carvajal MD;  Location: [facility] OR;  Service: Orthopedics        SWALLOW EVALUATION (last 72 hours)      SLP Adult Swallow Evaluation     Row Name  05/12/20 1300                   Rehab Evaluation    Document Type  evaluation  -        Patient Effort  adequate  -           General Information    Patient Profile Reviewed  yes  -        Pertinent History Of Current Problem  L frontal stroke, PD  -        Current Method of Nutrition  regular textures;thin liquids  -        Precautions/Limitations, Vision  WFL;for purposes of eval  -        Precautions/Limitations, Hearing  hearing impairment, bilaterally  -        Prior Level of Function-Communication  cognitive-linguistic impairment  -        Prior Level of Function-Swallowing  no diet consistency restrictions  -        Plans/Goals Discussed with  patient;family;agreed upon  -        Barriers to Rehab  medically complex  -        Patient's Goals for Discharge  patient did not state  -           Pain Assessment    Additional Documentation  Pain Scale: Numbers Pre/Post-Treatment (Group)  -           Pain Scale: Numbers Pre/Post-Treatment    Pain Scale: Numbers, Pretreatment  0/10 - no pain  -        Pain Scale: Numbers, Post-Treatment  0/10 - no pain  -           Clinical Swallow Eval    Oral Prep Phase  WFL  -SH        Oral Transit  WFL  -        Oral Residue  WFL  -SH        Pharyngeal Phase  WFL  -SH        Clinical Swallow Evaluation Summary  Patient seen for clinical swallow assessment. No overt s/s of aspiration noted with liquid or solids. Voice clear post swallow. SLP discussed patient's baseline status with POA, who reports patient can carry on a simple conversation. This date, patient with fluent speech, but unable to answer simple short answer questions. Will follow for SLE/SHANTANU as indicated.   -           Clinical Impression    SLP Swallowing Diagnosis  functional oral phase;functional pharyngeal phase  -        Functional Impact  risk of aspiration/pneumonia  -        Rehab Potential/Prognosis, Swallowing  good, to achieve stated therapy goals  -        Swallow  Criteria for Skilled Therapeutic Interventions Met  demonstrates skilled criteria  -           Recommendations    Therapy Frequency (Swallow)  PRN  -        Predicted Duration Therapy Intervention (Days)  until discharge  -        SLP Diet Recommendation  regular textures;thin liquids  -        Recommended Precautions and Strategies  upright posture during/after eating  -        SLP Rec. for Method of Medication Administration  meds whole;with thin liquids;as tolerated  -        Monitor for Signs of Aspiration  yes;notify SLP if any concerns  -        Anticipated Dischage Disposition  anticipate therapy at next level of care  -           Swallow Goals (SLP)    Oral Nutrition/Hydration Goal Selection (SLP)  oral nutrition/hydration, SLP goal 1  -           Oral Nutrition/Hydration Goal 1 (SLP)    Oral Nutrition/Hydration Goal 1, SLP  Pt will fred PO without overt s/s of asp.  -        Time Frame (Oral Nutrition/Hydration Goal 1, SLP)  by discharge  -          User Key  (r) = Recorded By, (t) = Taken By, (c) = Cosigned By    Initials Name Effective Dates     Helen Pedroza, MS CCC-SLP 03/07/18 -           EDUCATION  The patient has been educated in the following areas:   Dysphagia (Swallowing Impairment).    SLP Recommendation and Plan  SLP Swallowing Diagnosis: functional oral phase, functional pharyngeal phase  SLP Diet Recommendation: regular textures, thin liquids  Recommended Precautions and Strategies: upright posture during/after eating  SLP Rec. for Method of Medication Administration: meds whole, with thin liquids, as tolerated     Monitor for Signs of Aspiration: yes, notify SLP if any concerns     Swallow Criteria for Skilled Therapeutic Interventions Met: demonstrates skilled criteria  Anticipated Dischage Disposition: anticipate therapy at next level of care  Rehab Potential/Prognosis, Swallowing: good, to achieve stated therapy goals  Therapy Frequency (Swallow): PRN  Predicted  Duration Therapy Intervention (Days): until discharge       Plan of Care Reviewed With: patient  Progress: improving  Outcome Summary: Patient seen for clinical swallow assessment. No overt s/s of aspiration noted with liquid or solids. Voice clear post swallow. SLP discussed patient's baseline status with POA, who reports patient can carry on a simple conversation normally. This date, patient with fluent speech, but unable to answer simple short answer questions.    SLP GOALS     Row Name 05/12/20 1300             Oral Nutrition/Hydration Goal 1 (SLP)    Oral Nutrition/Hydration Goal 1, SLP  Pt will fred PO without overt s/s of asp.  -      Time Frame (Oral Nutrition/Hydration Goal 1, SLP)  by discharge  -        User Key  (r) = Recorded By, (t) = Taken By, (c) = Cosigned By    Initials Name Provider Type    Helen Bell MS CCC-SLP Speech and Language Pathologist           SLP Outcome Measures (last 72 hours)      SLP Outcome Measures     Row Name 05/12/20 1400             SLP Outcome Measures    Outcome Measure Used?  Adult NOMS  -         Adult FCM Scores    FCM Chosen  Swallowing  -      Swallowing FCM Score  7  -        User Key  (r) = Recorded By, (t) = Taken By, (c) = Cosigned By    Initials Name Effective Dates     Helen Pedroza MS CCC-SLP 03/07/18 -            Time Calculation:   Time Calculation- SLP     Row Name 05/12/20 1414             Time Calculation- [facility]    SLP Start Time  1300  -      SLP Received On  05/12/20  -        User Key  (r) = Recorded By, (t) = Taken By, (c) = Cosigned By    Initials Name Provider Type     Helen Pedroza MS CCC-SLP Speech and Language Pathologist          Therapy Charges for Today     Code Description Service Date Service Provider Modifiers Qty    [id number]  ST EVAL ORAL PHARYNG SWALLOW 3 5/12/2020 Helen Pedroza MS CCC-SLP GN 1               Helen Pedroza MS CCC-SOFIA  5/12/2020

## 2020-05-12 NOTE — PROGRESS NOTES
Name: Gregoria Jimenez ADMIT: 2020   : 1934  PCP: Rosanne Bland MD    MRN: [id number] LOS: 5 days   AGE/SEX: 86 y.o. female  ROOM: [facility]     Subjective   Subjective   Speech pretty clear.  Seems a little more confused today.    Review of Systems     Objective   Objective   Vital Signs  Temp:  [97.5 °F (36.4 °C)-98.8 °F (37.1 °C)] 97.6 °F (36.4 °C)  Heart Rate:  [53-66] 59  Resp:  [18] 18  BP: (101-199)/(55-89) 160/72  SpO2:  [93 %-98 %] 98 %  on   ;   Device (Oxygen Therapy): room air  Body mass index is 23.18 kg/m².  Physical Exam   Constitutional: She appears well-developed and well-nourished. No distress.   HENT:   Head: Normocephalic and atraumatic.   Nose: Nose normal.   Mouth/Throat: Oropharynx is clear and moist.   Neck: Neck supple.   Cardiovascular: Normal rate, regular rhythm and intact distal pulses.   Pulmonary/Chest: Effort normal. No respiratory distress. She has no rales.   Abdominal: Soft. Bowel sounds are normal. She exhibits no distension. There is no tenderness.   Musculoskeletal: Normal range of motion. She exhibits no edema or tenderness.   Neurological: She is alert. She has normal strength. No cranial nerve deficit or sensory deficit.   Tremor present   Skin: Skin is warm and dry. She is not diaphoretic. No erythema.   Psychiatric: She has a normal mood and affect.   Nursing note and vitals reviewed.      Results Review:       I reviewed the patient's new clinical results.  Results from last 7 days   Lab Units 20  0446 20  0324 20  2305   WBC 10*3/mm3 8.59 9.36 14.05*   HEMOGLOBIN g/dL 11.5* 10.6* 10.7*   PLATELETS 10*3/mm3 223 209 227     Results from last 7 days   Lab Units 20  0453 20  0316 05/10/20  1907 05/10/20  0346  20  0446   SODIUM mmol/L 133* 127*  --  131*  --  131*   POTASSIUM mmol/L 3.8 3.9 4.5 3.2*   < > 3.5   CHLORIDE mmol/L 89* 85*  --  83*  --  87*   CO2 mmol/L 32.2* 31.4*  --  36.5*  --  30.4*   BUN mg/dL 34* 31*   --  23  --  25*   CREATININE mg/dL 1.27* 1.18*  --  1.15*  --  0.82   GLUCOSE mg/dL 101* 104*  --  100*  --  134*    < > = values in this interval not displayed.   Estimated Creatinine Clearance: 31.7 mL/min (A) (by C-G formula based on SCr of 1.27 mg/dL (H)).  Results from last 7 days   Lab Units 05/06/20  2305   ALBUMIN g/dL 4.00   BILIRUBIN mg/dL 0.6   ALK PHOS U/L 96   AST (SGOT) U/L 60*   ALT (SGPT) U/L 12     Results from last 7 days   Lab Units 05/12/20  0453 05/11/20  0316 05/10/20  0346 05/09/20  0446 05/08/20  0333  05/06/20  2305   CALCIUM mg/dL 9.1 9.2 9.3 9.0 8.5*   < > 8.5*   ALBUMIN g/dL  --   --   --   --   --   --  4.00   MAGNESIUM mg/dL  --   --   --  1.9 1.9  --  2.0    < > = values in this interval not displayed.       Hemoglobin A1C   Date/Time Value Ref Range Status   05/12/2020 0453 6.00 (H) 4.80 - 5.60 % Final     Glucose   Date/Time Value Ref Range Status   05/12/2020 0610 123 70 - 130 mg/dL Final   05/11/2020 1738 106 70 - 130 mg/dL Final     MRI:  IMPRESSION:  Acute cortical infarct involving the left frontal lobe  laterally measuring 1.5 x 1 cm in maximum transverse dimension. Atrophy  and mild to moderate small vessel ischemic disease is noted. The above  information was called to the patient's nurse at the time of the  dictation. The patient's nurse is to relay the information to the  clinical service.    Carotid Doppler:  · Proximal right internal carotid artery plaque without significant stenosis.  · Proximal left internal carotid artery plaque without significant stenosis.      apixaban 2.5 mg Oral Q12H   atenolol 25 mg Oral Q12H   atorvastatin 40 mg Oral Nightly   carbidopa-levodopa 1.5 tablet Oral TID   carbidopa-levodopa 2 tablet Oral QAM   furosemide 40 mg Oral Daily   gabapentin 100 mg Oral Nightly   losartan 25 mg Oral Q24H   pantoprazole 40 mg Oral Q AM   polyethylene glycol 17 g Oral Daily   sennosides-docusate 2 tablet Oral BID   sodium chloride 10 mL Intravenous Q12H    sodium chloride 10 mL Intravenous Q12H      Diet Regular; Cardiac       Assessment/Plan     Active Hospital Problems    Diagnosis  POA   • **Diastolic CHF, acute (CMS/HCC) [I50.31]  Yes   • Acute UTI (urinary tract infection) [N39.0]  Yes   • New onset atrial fibrillation (CMS/HCC) [I48.91]  Yes   • 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     Small left MCA ischemic stroke  -Neurology recommendations noted.  Continue Eliquis  -PT/OT/speech eval is pending    Acute diastolic CHF, resolving  -Now on p.o. Lasix.  Medications adjusted by cardiology.  Monitor blood pressure    Hyponatremia, improving    Acute hypoxic resp failure- due to above  -wean o2 as fred     UTI-culture growing E. coli  -Completed Rocephin.  Doubt pneumonia, infiltrates on x-ray more likely secondary to the above issue    Atrial fibrillation, new onset, now sinus rhythm  - Continue atenolol and Eliquis    Hypertension- somewhat erratic. Monitor on current meds for now given new diagnosis of stroke.    Disposition- Plan d/c to halfway tomorrow if stable.       Alli Anne MD  [facility]  05/12/20  14:14

## 2020-05-13 ENCOUNTER — READMISSION MANAGEMENT (OUTPATIENT)
Dept: CALL CENTER | Facility: HOSPITAL | Age: 85
End: 2020-05-13

## 2020-05-13 VITALS
WEIGHT: 136.4 LBS | BODY MASS INDEX: 22.73 KG/M2 | TEMPERATURE: 98.1 F | DIASTOLIC BLOOD PRESSURE: 84 MMHG | RESPIRATION RATE: 20 BRPM | OXYGEN SATURATION: 93 % | SYSTOLIC BLOOD PRESSURE: 105 MMHG | HEART RATE: 58 BPM | HEIGHT: 65 IN

## 2020-05-13 PROBLEM — N39.0 ACUTE UTI (URINARY TRACT INFECTION): Status: RESOLVED | Noted: 2020-05-08 | Resolved: 2020-05-13

## 2020-05-13 PROBLEM — I63.512 ACUTE ISCHEMIC LEFT MCA STROKE (HCC): Status: ACTIVE | Noted: 2020-05-13

## 2020-05-13 PROBLEM — N18.30 CKD (CHRONIC KIDNEY DISEASE) STAGE 3, GFR 30-59 ML/MIN (HCC): Status: ACTIVE | Noted: 2020-05-13

## 2020-05-13 RX ORDER — POLYETHYLENE GLYCOL 3350 17 G/17G
17 POWDER, FOR SOLUTION ORAL DAILY PRN
Start: 2020-05-13

## 2020-05-13 RX ORDER — FUROSEMIDE 20 MG/1
20 TABLET ORAL DAILY
Qty: 30 TABLET | Refills: 0 | Status: SHIPPED | OUTPATIENT
Start: 2020-05-14

## 2020-05-13 RX ORDER — LOSARTAN POTASSIUM 25 MG/1
25 TABLET ORAL
Qty: 30 TABLET | Refills: 0 | Status: SHIPPED | OUTPATIENT
Start: 2020-05-14

## 2020-05-13 RX ORDER — FUROSEMIDE 20 MG/1
20 TABLET ORAL DAILY
Status: DISCONTINUED | OUTPATIENT
Start: 2020-05-14 | End: 2020-05-13 | Stop reason: HOSPADM

## 2020-05-13 RX ORDER — ATORVASTATIN CALCIUM 40 MG/1
40 TABLET, FILM COATED ORAL NIGHTLY
Qty: 30 TABLET | Refills: 1 | Status: SHIPPED | OUTPATIENT
Start: 2020-05-13

## 2020-05-13 RX ORDER — ATENOLOL 25 MG/1
25 TABLET ORAL EVERY 12 HOURS SCHEDULED
Qty: 60 TABLET | Refills: 0 | Status: SHIPPED | OUTPATIENT
Start: 2020-05-13

## 2020-05-13 RX ADMIN — PANTOPRAZOLE SODIUM 40 MG: 40 TABLET, DELAYED RELEASE ORAL at 06:04

## 2020-05-13 RX ADMIN — FUROSEMIDE 40 MG: 40 TABLET ORAL at 08:27

## 2020-05-13 RX ADMIN — APIXABAN 2.5 MG: 2.5 TABLET, FILM COATED ORAL at 08:27

## 2020-05-13 RX ADMIN — SODIUM CHLORIDE, PRESERVATIVE FREE 10 ML: 5 INJECTION INTRAVENOUS at 08:27

## 2020-05-13 RX ADMIN — CARBIDOPA AND LEVODOPA 1.5 TABLET: 25; 100 TABLET ORAL at 16:03

## 2020-05-13 RX ADMIN — POLYETHYLENE GLYCOL 3350 17 G: 17 POWDER, FOR SOLUTION ORAL at 08:26

## 2020-05-13 RX ADMIN — ATENOLOL 25 MG: 25 TABLET ORAL at 08:27

## 2020-05-13 RX ADMIN — LOSARTAN POTASSIUM 25 MG: 25 TABLET, FILM COATED ORAL at 08:27

## 2020-05-13 RX ADMIN — CARBIDOPA AND LEVODOPA 2 TABLET: 25; 100 TABLET ORAL at 06:04

## 2020-05-13 RX ADMIN — DOCUSATE SODIUM 50MG AND SENNOSIDES 8.6MG 2 TABLET: 8.6; 5 TABLET, FILM COATED ORAL at 08:27

## 2020-05-13 RX ADMIN — CARBIDOPA AND LEVODOPA 1.5 TABLET: 25; 100 TABLET ORAL at 08:27

## 2020-05-13 NOTE — DISCHARGE PLACEMENT REQUEST
"Tank Tidwell (86 y.o. Female)     Date of Birth Social Security Number Address Home Phone MRN    [id number]  [id number]    [facility] [address] [phone] [id number]    Taoism Marital Status          Dru[place] Single       Admission Date Admission Type Admitting Provider Attending Provider Department, Room/Bed    5/6/20 Emergency Naun Sparks MD [place], Matthew Brett, MD [address], [address]    Discharge Date Discharge Disposition Discharge Destination         [facility] [place]              Attending Provider:  Alli Anne MD    Allergies:  Sulfa Antibiotics    Isolation:  None   Infection:  None   Code Status:  CPR    Ht:  165.1 cm (65\")   Wt:  61.9 kg (136 lb 6.4 oz)    Admission Cmt:  None   Principal Problem:  Diastolic CHF, acute (CMS/[facility]) [I50.31]                 Active Insurance as of 5/6/2020     Primary Coverage     Payor Plan Insurance Group Employer/Plan Group    MEDICARE MEDICARE A & B      Payor Plan Address Payor Plan Phone Number Payor Plan Fax Number Effective Dates    [address] [phone]  1/1/1999 - None Entered    [facility] [id number]       Subscriber Name Subscriber Birth Date Member ID       TANK TIDWELL 1934 [id number]           Secondary Coverage     Payor Plan Insurance Group Employer/Plan Group    [facility] SUPP [id number]     Payor Plan Address Payor Plan Phone Number Payor Plan Fax Number Effective Dates    [address]   12/1/2016 - None Entered    [facility] [id number]       Subscriber Name Subscriber Birth Date Member ID       TANK TIDWELL 1934 [id number]                 Emergency Contacts      (Rel.) Home Phone Work Phone Mobile Phone    Sabi Loyd (Surrogate) [phone] -- --    Kathi Abdalla (Sister) [phone]    Winter Walls (Relative) [phone] -- --              "

## 2020-05-13 NOTE — DISCHARGE SUMMARY
Discharge Summary    Patient Name: Gregoria Jimenez  Age/Sex: 86 y.o. female  : 1934  MRN: [id number]  Patient Care Team:  Rosanne Bland MD as PCP - General (Geriatric Medicine)    Hospital Course     Date of Admit: 2020  Date of Discharge:  20   Discharge Condition: Stable    Discharge Diagnoses:    Diastolic CHF, acute (CMS/HCC)    New onset atrial fibrillation (CMS/HCC)    Parkinson's disease dementia (CMS/HCC)    GERD (gastroesophageal reflux disease)    Hyponatremia    CKD (chronic kidney disease) stage 3, GFR 30-59 ml/min (CMS/HCC)    Acute ischemic left MCA stroke (CMS/HCC)    Present on Admission:  • New onset atrial fibrillation (CMS/HCC)  • Diastolic CHF, acute (CMS/HCC)  • (Resolved) Acute UTI (urinary tract infection)      Hospital Course:   Gregoria Jimenez presented to [facility] with complaints of shortness of breath and chest tightness. Please see the admitting history and physical for further details. She was found to have acute diastolic CHF and atrial fibrillation and was admitted to the hospital for further evaluation and treatment.  Patient has been recently diagnosed with atrial fibrillation as outpatient, but only had 1 dose of medication prior to admission.  Cardiology was consulted and was initially treated with IV diuretics and atenolol was increased. She was resumed on recently prescribed Eliquis 2.5 BID. She sucessfully converted to sinus bradycardia.     Echocardiogram showed EF 56% moderate TR. Losartan was added per cardiology for blood pressure control. At time of discharge her creatinine had increased and lasix was reduced to 20 mg daily. She will need a follow up BMP in a few days and monitor BP and heart rate with close follow up with cardiology for any medication adjustments.     On admission she was noted to have UTI and completed a full course of IV antibiotics with no further symptoms. Oxygen was weaned off after good diuresis  and was tolerating room air for 24 hours prior to discharge.     During this admission, she had an episode of hypotension with aphasia and reported episodes of double vision even prior to admission. Neurology was consulted and MRI of the brain revealed a small left MCA stroke that was likely embolic from afib. Neurology did not feel it was NOAC failure and recommended continuing Eliquis 2.5 BID without aspirin. Lipitor was added and patient tolerated well. She will follow up with her neurologist as outpatient. Speech evaluated and cleared her for a regular diet. Per therapy recommendations she was felt to benefit from more skilled therapies. Patient declined subacute rehab, but agreed to go back to assisted living with home health.     Consults:   IP CONSULT TO HOSPITALIST  IP CONSULT TO CARDIOLOGY  IP CONSULT TO NEUROLOGY  IP CONSULT TO NEURO CLINICAL SPECIALIST  IP CONSULT TO REHAB ADMISSION  IP CONSULT TO CASE MANAGEMENT     Significant Discharge Diagnostics   Procedures Performed:         Pertinent Lab Results:  Results from last 7 days   Lab Units 05/12/20  0453 05/11/20  0316 05/10/20  1907 05/10/20  0346 05/09/20  1151 05/09/20  0446 05/08/20  0333 05/07/20  0324 05/06/20  2305   SODIUM mmol/L 133* 127*  --  131*  --  131* 130* 129* 127*   POTASSIUM mmol/L 3.8 3.9 4.5 3.2* 3.8 3.5 3.2* 3.9 4.5   CHLORIDE mmol/L 89* 85*  --  83*  --  87* 86* 87* 87*   CO2 mmol/L 32.2* 31.4*  --  36.5*  --  30.4* 29.8* 23.9 22.6   BUN mg/dL 34* 31*  --  23  --  25* 34* 38* 39*   CREATININE mg/dL 1.27* 1.18*  --  1.15*  --  0.82 0.99 1.21* 1.33*   GLUCOSE mg/dL 101* 104*  --  100*  --  134* 105* 142* 189*   CALCIUM mg/dL 9.1 9.2  --  9.3  --  9.0 8.5* 8.7 8.5*   AST (SGOT) U/L  --   --   --   --   --   --   --   --  60*   ALT (SGPT) U/L  --   --   --   --   --   --   --   --  12     Results from last 7 days   Lab Units 05/07/20  0324 05/07/20  0026 05/06/20  2305   TROPONIN T ng/mL 0.030 0.031* 0.035*     Results  from last 7 days   Lab Units 05/09/20  0446 05/07/20  0324 05/06/20  2305   WBC 10*3/mm3 8.59 9.36 14.05*   HEMOGLOBIN g/dL 11.5* 10.6* 10.7*   HEMATOCRIT % 33.8* 31.4* 31.3*   PLATELETS 10*3/mm3 223 209 227   MCV fL 88.3 87.5 88.7   MCH pg 30.0 29.5 30.3   MCHC g/dL 34.0 33.8 34.2   RDW % 12.6 12.4 12.3   RDW-SD fl 40.6 39.3 39.1   MPV fL 10.8 11.1 11.4   NEUTROPHIL % %  --   --  77.4*   LYMPHOCYTE % %  --   --  14.2*   MONOCYTES % %  --   --  7.8   EOSINOPHIL % %  --   --  0.1*   BASOPHIL % %  --   --  0.1   IMM GRAN % %  --   --  0.4   NEUTROS ABS 10*3/mm3  --  8.42* 10.87*   LYMPHS ABS 10*3/mm3  --   --  1.99   MONOS ABS 10*3/mm3  --   --  1.10*   EOS ABS 10*3/mm3  --   --  0.01   BASOS ABS 10*3/mm3  --   --  0.02   IMMATURE GRANS (ABS) 10*3/mm3  --   --  0.06*   NRBC /100 WBC  --   --  0.1     Results from last 7 days   Lab Units 05/07/20  0324   INR  1.24*     Results from last 7 days   Lab Units 05/09/20  0446 05/08/20  0333 05/06/20  2305   MAGNESIUM mg/dL 1.9 1.9 2.0     Results from last 7 days   Lab Units 05/12/20  0453   CHOLESTEROL mg/dL 139   TRIGLYCERIDES mg/dL 116   HDL CHOL mg/dL 34*     Results from last 7 days   Lab Units 05/06/20  2305   PROBNP pg/mL 11,383.0*                         Results from last 7 days   Lab Units 05/06/20  2328   URINECX  >100,000 CFU/mL Escherichia coli*     Results from last 7 days   Lab Units 05/06/20  2328   NITRITE UA  Positive*   WBC UA /HPF 31-50*   BACTERIA UA /HPF 1+*   SQUAM EPITHEL UA /HPF 0-2   URINECX  >100,000 CFU/mL Escherichia coli*               Imaging Results:  Imaging Results (Most Recent)     Procedure Component Value Units Date/Time    CT Head Without Contrast [[id number]] Collected:  05/11/20 1322     Updated:  05/12/20 0921    Narrative:       CT HEAD WITHOUT CONTRAST     HISTORY: Focal neuro deficit, new, altered mental status, confusion,  weakness.     COMPARISON: CT head 05/06/2020.     A noncontrasted CT examination of brain was performed.      FINDINGS: The brain and ventricles are symmetrical. Moderate vascular  calcification involving the carotid siphons are noted. There is no  evidence of hemorrhage, hydrocephalus or of a focal area of decreased  attenuation to suggest acute infarction. Areas of decreased attenuation  involving the white matter cerebral hemispheres are noted bilaterally  suggesting mild-to-moderate small vessel ischemic disease. This appears  similar to the prior examination.       Impression:       No evidence of acute infarction or hemorrhage. Vascular  calcification and mild to moderate small vessel ischemic disease is  noted. Further evaluation could be performed with MRI examination of  brain as indicated.           Radiation dose reduction techniques were utilized, including automated  exposure control and exposure modulation based on body size.     This report was finalized on 5/12/2020 9:18 AM by Dr. Elvin Anderson M.D.       MRI Brain Without Contrast [[id number]] Collected:  05/11/20 1721     Updated:  05/12/20 0921    Narrative:       MRI BRAIN WITHOUT CONTRAST     HISTORY: TIA.     COMPARISON: CT head 05/11/2020.     TECHNIQUE: A noncontrasted MRI examination of the brain was performed  utilizing sagittal T1, axial diffusion, T1, T2, T2 FLAIR and gradient  echo T2 imaging.     FINDINGS: The diffusion sequence demonstrates an area of restricted  diffusion involving the left frontal lobe laterally measuring  approximately 1.5 x 1.0 cm in size, consistent with a small acute  infarct. There is mild T2 FLAIR hyperintensity related to the cortex at  the same location. There is age-appropriate atrophy. Mild small vessel  ischemic disease is noted.     There is expected flow-void in the basilar artery and in the distal  aspect of the internal carotid arteries bilaterally on the axial T2  sequence.       Impression:       Acute cortical infarct involving the left frontal lobe  laterally measuring 1.5 x 1 cm in maximum transverse  dimension. Atrophy  and mild to moderate small vessel ischemic disease is noted. The above  information was called to the patient's nurse at the time of the  dictation. The patient's nurse is to relay the information to the  clinical service.     This report was finalized on 5/12/2020 9:18 AM by Dr. Elvin Anderson M.D.       XR Chest 1 View [[id number]] Collected:  05/06/20 2357     Updated:  05/07/20 0001    Narrative:       PORTABLE CHEST RADIOGRAPH     HISTORY: Shortness of air and chest pain     COMPARISON: 02/03/2017     FINDINGS:  Cardiomegaly is identified. There is diffuse interstitial prominence,  concerning for vascular congestion. There are bilateral pleural  effusions. Bibasilar consolidation is seen, which may represent  atelectasis. However, correlation with any symptoms of pneumonia is  suggested. No pneumothorax is seen.       Impression:          1. Cardiomegaly and vascular congestion and bilateral pleural effusions.  2. Bibasilar consolidation may represent atelectasis. Correlation with  any symptoms of pneumonia is recommended, however.     This report was finalized on 5/6/2020 11:58 PM by Dr. Rach Frazier M.D.       CT Head Without Contrast [[id number]] Collected:  05/06/20 2348     Updated:  05/06/20 2355    Narrative:       CT HEAD WITHOUT CONTRAST     HISTORY: Altered mental status. Patient is on blood thinners.     COMPARISON: None available.     TECHNIQUE: Axial CT imaging was obtained from vertex of the skull  through the skull base. No IV contrast was administered.     FINDINGS:  No acute intracranial hemorrhage is seen. There is diffuse atrophy.  There is no midline shift or mass effect. There is periventricular and  deep white matter microangiopathic change. Mucous retention cyst is seen  within the right maxillary sinus.       Impression:       No acute findings.     Radiation dose reduction techniques were utilized, including automated  exposure control and exposure modulation  based on body size.     This report was finalized on 5/6/2020 11:51 PM by Dr. Rach Frazier M.D.               Objective:   Temp:  [97.5 °F (36.4 °C)-97.8 °F (36.6 °C)] 97.7 °F (36.5 °C)  Heart Rate:  [53-59] 53  Resp:  [18-20] 20  BP: (130-167)/(56-76) 130/56   SpO2:  [90 %-98 %] 93 %  on    Device (Oxygen Therapy): room air    Intake/Output Summary (Last 24 hours) at 5/13/2020 1226  Last data filed at 5/12/2020 1852  Gross per 24 hour   Intake 240 ml   Output 300 ml   Net -60 ml     Body mass index is 22.7 kg/m².      05/10/20  0539 05/12/20  0500 05/13/20  0500   Weight: 53.3 kg (117 lb 6.4 oz) (nurse notified ) 63.2 kg (139 lb 4.8 oz) 61.9 kg (136 lb 6.4 oz)   S: denies any dizziness, headaches, blurred vision or syncope.     Physical Exam   Constitutional: She is oriented to person, place, and time. She appears well-developed and well-nourished. No distress.   Denied any further double vision and was able to read with reading glasses without any issues   HENT:   Head: Normocephalic and atraumatic.   Nose: Nose normal.   Neck: Neck supple.   Cardiovascular: Regular rhythm and intact distal pulses. Bradycardia present.   Pulmonary/Chest: Effort normal. No respiratory distress. She has no rales.   diminished bases   Abdominal: Soft. Bowel sounds are normal. She exhibits no distension. There is no tenderness.   Musculoskeletal: Normal range of motion. She exhibits no edema or tenderness.   Neurological: She is alert and oriented to person, place, and time. She has normal strength. No cranial nerve deficit or sensory deficit.   Tremor present   Skin: Skin is warm and dry. She is not diaphoretic. No erythema.   Psychiatric: She has a normal mood and affect.   Nursing note and vitals reviewed.      Discharge Medications and Instructions:     Discharge Medications     Discharge Medications      New Medications      Instructions Start Date   atenolol 25 MG tablet  Commonly known as:  TENORMIN   25 mg, Oral, Every  12 Hours Scheduled      atorvastatin 40 MG tablet  Commonly known as:  LIPITOR   40 mg, Oral, Nightly      furosemide 20 MG tablet  Commonly known as:  LASIX   20 mg, Oral, Daily   Start Date:  May 14, 2020     losartan 25 MG tablet  Commonly known as:  COZAAR   25 mg, Oral, Every 24 Hours Scheduled   Start Date:  May 14, 2020     polyethylene glycol 17 g packet  Commonly known as:  MIRALAX   17 g, Oral, Daily PRN, May get OTC         Continue These Medications      Instructions Start Date   acetaminophen 325 MG tablet  Commonly known as:  TYLENOL   325-650 mg, Oral, Every 4 Hours PRN      apixaban 2.5 MG tablet tablet  Commonly known as:  ELIQUIS   2.5 mg, Oral, 2 Times Daily      carbidopa-levodopa  MG per tablet  Commonly known as:  SINEMET   1.5 tablets, Oral, 3 Times Daily      carbidopa-levodopa  MG per tablet  Commonly known as:  SINEMET   2 tablets, Oral, Every Morning      gabapentin 100 MG capsule  Commonly known as:  NEURONTIN   100 mg, Oral, Nightly      pantoprazole 20 MG EC tablet  Commonly known as:  PROTONIX   20 mg, Oral, Daily      sennosides-docusate 8.6-50 MG per tablet  Commonly known as:  PERICOLACE   2 tablets, Oral, 2 Times Daily      Vitamin D3 1.25 MG (78682 UT) tablet   1 tablet, Oral, Every 14 Days         Stop These Medications    aspirin 81 MG chewable tablet     meloxicam 15 MG tablet  Commonly known as:  MOBIC     Ziac 5-6.25 MG per tablet  Generic drug:  bisoprolol-hydrochlorothiazide             Medication Reconciliation: Please see electronically completed Med Rec.    Discharge Diet:    Dietary Orders (From admission, onward)     Start     Ordered    05/07/20 0102  Diet Regular; Cardiac  Diet Effective Now     Question Answer Comment   Diet Texture / Consistency Regular    Common Modifiers Cardiac        05/07/20 0104                Activity at Discharge:    Activity Instructions     Activity as Tolerated             Discharge disposition: assisted living with home  health    Discharge Instructions and Follow ups:  Additional Instructions for the Follow-ups that You Need to Schedule     Referral to Home Health   As directed      Face to Face Visit Date:  5/13/2020    Follow-up provider for Plan of Care?:  I treated the patient in an acute care facility and will not continue treatment after discharge.    Follow-up provider:  ROSANNE BLAND [[id number]]    Reason/Clinical Findings:  stroke, afib and chf    Describe mobility limitations that make leaving home difficult:  decreased mobility requiring assistive devices    Nursing/Therapeutic Services Requested:  Skilled Nursing Physical Therapy Occupational Therapy    Skilled nursing orders:  CHF management (labs) Medication education Cardiopulmonary assessments    PT orders:  Therapeutic exercise Transfer training Strengthening Home safety assessment    Occupational orders:  Activities of daily living Energy conservation Strengthening Fine motor Home safety assessment    Frequency:  1 Week 1         Basic Metabolic Panel    May 18, 2020 (Approximate)      Please call lab results to Dr. Pallavi callahan    Order Comments:  Please call lab results to Dr. Pallavi callahan             Contact information for follow-up providers     Rosanne Bland MD Follow up.    Specialty:  Geriatric Medicine  Contact information:  [address]  [facility] [id number] [phone]             Fredo Stephens MD Follow up.    Specialty:  Cardiology  Contact information:  [address]  [address]  [place]  [facility] [id number] [phone]             Tad Bush MD Follow up in 3 month(s).    Specialty:  Neurology  Contact information:  [address]  [address]  [facility] [address]  [phone]                   Contact information for after-discharge care     Destination     [facility] .    Service:  Assisted Living  Contact information:  [address] [facility]  [address]  [phone]                           Future Appointments   Date Time Provider Department Center   5/21/2020  2:00 PM [facility] Ragsdale, ANNMARIE MGK CD [facility] None         Total time spent discharging patient including evaluation, medication reconciliation, arranging follow up, and post hospitalization instructions and education total time exceeds 30 minutes.      ANNMARIE Polk  05/13/20  11:29 AM

## 2020-05-13 NOTE — PROGRESS NOTES
Continued Stay Note  [facility]     Patient Name: Gregoria Jimenez  MRN: [id number]  Today's Date: 5/13/2020    Admit Date: 5/6/2020    Discharge Plan     Row Name 05/13/20 1505       Plan    Plan  Return to [facility] with [facility] and [facility] via transportation thru vandana Winter at 4:30pm    Patient/Family in Agreement with Plan  yes    Plan Comments  Recieved inbound call from Fartun  and Elizabeth ANGUIANO Pts PCP. CCP reviewed PT notes and pt level of care needs with nurse practioner. Elizabeth states that they specifically use [facility] for their patients and requests referral to [facility]. CCP explained pt choice and would review with pt. Spoke with pt and she does not have preference for HH provider and would like to use who her provider works with. CCP called [facility] to cancel referral. Called [facility] and dc home. Spoke with neshahid Winter and she will pickup pt at 4:30pm, call nurses station one her way so nursing can arrange transport down to pt discharge. andrés rn/ccp        Discharge Codes    No documentation.       Expected Discharge Date and Time     Expected Discharge Date Expected Discharge Time    May 13, 2020             Adela Buckley, RN

## 2020-05-13 NOTE — PLAN OF CARE
Condition stable during the night.  Problem: Cardiac: Heart Failure (Adult)  Goal: Signs and Symptoms of Listed Potential Problems Will be Absent, Minimized or Managed (Cardiac: Heart Failure)  Outcome: Ongoing (interventions implemented as appropriate)     Problem: Arrhythmia/Dysrhythmia (Symptomatic) (Adult)  Goal: Signs and Symptoms of Listed Potential Problems Will be Absent, Minimized or Managed (Arrhythmia/Dysrhythmia)  Outcome: Ongoing (interventions implemented as appropriate)

## 2020-05-13 NOTE — PROGRESS NOTES
Continued Stay Note  [facility]     Patient Name: Gregoria Jimenez  MRN: [id number]  Today's Date: 5/13/2020    Admit Date: 5/6/2020    Discharge Plan     Row Name 05/13/20 1141       Plan    Plan  Return to [facility] with [facility], [facility] via transportation    Provided Post Acute Provider List?  Yes    Post Acute Provider List  Home Health    Provided Post Acute Provider Quality & Resource List?  Refused    Refused Quality and Resource List Comment  pt wishes to use facility HH providers    Patient/Family in Agreement with Plan  yes    Plan Comments  CCP spoke with Alissa ANGUIANO, she would like pt to dc home with HH and therapy at AL facility, confirm pts eliquis picked up and schedule a cardiology follow up appointment in 1 week. CCP called [facility] and spoke with Myra and she confirmed Eliquis pickup on 5/6/2020. CCP spoke with Martinez/Binu and she states HH provider is [facility] for nursing and PT is [facility]. Spoke with pt at bedside regarding HH providers and she chose to use [facility] and [facility] that is in the building. Pt also stated she does not have any transportation back home, normally she uses Facility transportation but they are on lockdown with COVID19 Pandemic precautions. CCP verbalized understanding and explained could assist with  van or lyft services. Pt stated for CCP to call her sister Kathi. CCP called Kathi and she requested CCP call her daughters in regards to pts transportation. CCP called Winter and left vm requesting call back. Spoke with [facility] for referral and dc today. CCP called Cardiology office, appointment scheduled with Nurse Practioner for Dr. Seogvia on 5/21/2020 at 2pm, via telephone call, they will call her 15 minutes prior to appointment to register her. Added to TIAGO bowen rn/ccp        Discharge Codes    No documentation.       Expected Discharge Date and Time     Expected Discharge  Date Expected Discharge Time    May 13, 2020             Adela Buckley RN

## 2020-05-13 NOTE — NURSING NOTE
Acute Inpt Rehab Note     Received referral through stroke order set for acute inpt rehab.  Imaging positive for acute cortical infarct left frontal lobe.  Speech therapy has recommended regular cardiac diet, and with PT and OT she is functioning primarily at a contact guard level or less.  An acute inpt rehab stay is not indicated at this time and note plans for assisted living with home health.  Acute inpt rehab to sign off at this time, but please re-consult should patient condition change.      Thank you,   Hailey Dorsey RN   Rehab Admission RN

## 2020-05-13 NOTE — OUTREACH NOTE
Prep Survey      Responses   [facility] facility patient discharged from?  [place]   Is LACE score < 7 ?  No   Eligibility  Readm Mgmt   Discharge diagnosis  CHF, new onset afib, Parkinson's dementia, CVA   COVID-19 Test Status  Negative   Does the patient have one of the following disease processes/diagnoses(primary or secondary)?  CHF   Does the patient have Home health ordered?  Yes   What is the Home health agency?   [facility]   Is there a DME ordered?  No   General alerts for this patient  Return to Assisted Living   Prep survey completed?  Yes          Irene Fajardo RN

## 2020-05-14 NOTE — PROGRESS NOTES
Case Management Discharge Note      Final Note: [facility] at [facility] with [facility] and family transporting    Provided Post Acute Provider List?: Yes  Post Acute Provider List: Home Health  Provided Post Acute Provider Quality & Resource List?: Refused  Refused Quality and Resource List Comment: pt wishes to use facility HH providers    Destination - Selection Complete      Service Provider Request Status Selected Services Address Phone Number Fax Number    [facility] [place] [facility] [address] [facility] [address] [phone]      Durable Medical Equipment      No service has been selected for the patient.      Dialysis/Infusion      No service has been selected for the patient.      Home Medical Care      Service Provider Request Status Selected Services Address Phone Number Fax Number    [facility] [facility] [address], [address], [facility] [address] [phone]      Therapy      No service has been selected for the patient.      Community Resources      No service has been selected for the patient.        Transportation Services  Private: Car    Final Discharge Disposition Code: 06 - home with home health care

## 2020-05-15 ENCOUNTER — READMISSION MANAGEMENT (OUTPATIENT)
Dept: CALL CENTER | Facility: HOSPITAL | Age: 85
End: 2020-05-15

## 2020-05-15 NOTE — OUTREACH NOTE
CHF Week 1 Survey      Responses   [facility] [place] patient discharged from?  [place]   Does the patient have one of the following disease processes/diagnoses(primary or secondary)?  CHF   Is there a successful TCM telephone encounter documented?  No   CHF Week 1 attempt successful?  Yes   Call start time  1615   Call end time  1620   Discharge diagnosis  CHF, new onset afib, Parkinson's dementia, CVA   Meds reviewed with patient/caregiver?  Yes   Is the patient having any side effects they believe may be caused by any medication additions or changes?  No   Does the patient have all medications ordered at discharge?  Yes   Is the patient taking all medications as directed (includes completed medication regime)?  Yes   Does the patient have a primary care provider?   Yes   Does the patient have an appointment with their PCP within 7 days of discharge?  Yes   Has the patient kept scheduled appointments due by today?  N/A   What is the Home health agency?   [facility]    Has home health visited the patient within 72 hours of discharge?  Yes   Psychosocial issues?  No   Comments  states having slight nausea or indigestion, feels like it may be related to new meds,  nurses reviewing meds   Did the patient receive a copy of their discharge instructions?  Yes   Nursing interventions  Reviewed instructions with patient   What is the patient's perception of their health status since discharge?  Improving   Nursing interventions  Nurse provided patient education   Is the patient weighing daily?  Yes   Does the patient have scales?  Yes [pt states needs new batteries in scales and will continue]   Is the patient able to teach back Heart Failure diet management?  Yes   Is the patient able to teach back Heart Failure Zones?  Yes   Is the patient able to teach back signs and symptoms of worsening condition? (i.e. weight gain, shortness of air, etc.)  Yes   Is the patient/caregiver able to teach back the hierarchy of who to  call/visit for symptoms/problems? PCP, Specialist, Home health nurse, Urgent Care, ED, 911  Yes   Additional teach back comments  pt lives in assisted living facilty, gets help with meds, taking baths etc, stastes is free of SOA or edema    CHF Week 1 call completed?  Yes          Lorie Paul RN

## 2020-05-21 ENCOUNTER — READMISSION MANAGEMENT (OUTPATIENT)
Dept: CALL CENTER | Facility: HOSPITAL | Age: 85
End: 2020-05-21

## 2020-05-22 ENCOUNTER — DOCUMENTATION (OUTPATIENT)
Dept: CARDIOLOGY | Facility: CLINIC | Age: 85
End: 2020-05-22

## 2020-05-22 ENCOUNTER — TELEPHONE (OUTPATIENT)
Dept: CARDIOLOGY | Facility: CLINIC | Age: 85
End: 2020-05-22

## 2020-05-22 NOTE — PROGRESS NOTES
Ms. Jimeenz was recently hospitalized.  In 2017, she saw Dr. Stephens and he released her at that time.    Dr. Jonnathan Lawrence consulted on her during her recent hospitalization.  She would like to establish care with him.  She was scheduled with me today and that appointment will be canceled.  She will follow-up in 1 week for a telehealth visit with ANNMARIE Alexander to see if she is rate controlled with atrial fibrillation as he states that is her main cardiac issue at this time.  At some point she may need an echocardiogram to be completed, but he does not feel that she is a candidate for mitral valve repair/clip at this time.

## 2020-05-22 NOTE — TELEPHONE ENCOUNTER
Ms. Jimenez was recently hospitalized.  In 2017, she saw Dr. Stephens and he released her at that time.     Dr. Jonnathan Lawrence consulted on her during her recent hospitalization.  She would like to establish care with him.  She was scheduled with me today and that appointment will be canceled.  She will follow-up in 1 week for a telehealth visit with ANNMARIE Alexander to see if she is rate controlled with atrial fibrillation as he states that is her main cardiac issue at this time.  At some point she may need an echocardiogram to be completed, but he does not feel that she is a candidate for mitral valve repair/clip at this time.    Spoke with Elizabeth ANGUIANO that is seeing pt at [facility] [place].  She does not feel that an appt over the phone will be affective.  Elizabeth states that you are free to reach out to her at any time.    Elizabeth [phone]   Continue Regimen: 2. Neutrogena Oil Free Acne Cleanser - cleanse twice daily Detail Level: Zone Plan: 3. Begin LP Toleraine Ultra Soothing Lotion Initiate Treatment: 1. Soolantra 1 % topical cream ([facility]) - Apply pea size amount to full face nightly

## 2020-05-27 ENCOUNTER — READMISSION MANAGEMENT (OUTPATIENT)
Dept: CALL CENTER | Facility: HOSPITAL | Age: 85
End: 2020-05-27

## 2020-05-27 NOTE — OUTREACH NOTE
"CHF Week 3 Survey      Responses   [facility] patient discharged from?  [place]   Does the patient have one of the following disease processes/diagnoses(primary or secondary)?  CHF   Week 3 attempt successful?  Yes   Call start time  1218   Call end time  1223   General alerts for this patient  Pt lives at [facility] of [facility]   Discharge diagnosis  CHF, new onset afib, Parkinson's dementia, CVA   Meds reviewed with patient/caregiver?  Yes   Is the patient taking all medications as directed (includes completed medication regime)?  Yes   Comments regarding appointments  Appt with Ijeoma Ragsdale on 5/21/20-canceled. Pt reports she's spoken with providers on the phone since D/C.   Has the patient kept scheduled appointments due by today?  N/A   What is the Home health agency?   [facility] -pt reports \"they won't let anyone in\"   What is the patient's perception of their health status since discharge?  Improving [Pt reports, \"I'm getting a little stronger. I've not eaten for the last couple of days. Solid food made me sick at my stomach.\" She started eating better within the last 3 days. ]   Nursing interventions  Nurse provided patient education   Is the patient weighing daily?  No [She's not been able to leave her home to weigh. They are still in lock down. ]   Does the patient have scales?  Yes [She has a scale in the exercise room at the facility she lives at. ]   Daily weight interventions  Education provided on importance of daily weight   Is the patient able to teach back Heart Failure Zones?  Yes   Is the patient able to teach back signs and symptoms of worsening condition? (i.e. weight gain, shortness of air, etc.)  Yes   CHF Week 3 call completed?  Yes          Nohemy Graham RN  "

## 2020-05-29 ENCOUNTER — TELEPHONE (OUTPATIENT)
Dept: NEUROLOGY | Facility: CLINIC | Age: 85
End: 2020-05-29

## 2020-05-29 NOTE — TELEPHONE ENCOUNTER
Stroke Phone Call  Spoke with the patient  · Admission Date: 5/6/2020  · Discharge Date:  5/13/2020  · Discharge Destination: [facility] [place]  · Meds reviewed with patient/caregiver?     [x]Yes [] No   o Cholesterol Reducing: Lipitor  o Anticoagulant: Eliquis  · Is the patient taking all medication as directed?   [x]  Yes  []  No  Per patient she does not know which medications she is taking, medications are handled by staff at assisted living facility.  · Discussed personal risk factors   [x]  Yes []  No    o Physical Inactivity  o High cholesterol   - Review desired LDL goal <70  o Atherosclerosis  - Plaque inside the arteries, “hardening of the arteries”  o Atrial fibrillation   • Discussed signs and symptoms of stroke and when patient to call 911?   [x]  Yes []  No  o Sudden weakness or numbness of the face, arm, or leg especially on one side of the body  o Sudden confusion, trouble speaking or understanding  o Sudden trouble seeing in one or both eyes   o Sudden trouble walking, dizziness, loss of balance or coordination  o Sudden severe headaches with no known cause    Notified Patient that if any of these symptoms occur to call 911  · Does the patient have any new signs or symptoms of a stroke?   []  Yes     [x]  No  • Does the patient have increasing stiffness in arms, hands, or legs?    []  Yes     [x]  No   Is this interfering with activities of daily living?   []  Yes     [x]  No  · Does the patient have an appointment with PCP?  []  Yes     [x]  No  · Does the patient have 3 month Stroke Clinic appointment?  []  Yes     [x]  No  · Is the patient currently in therapy, outpatient, or home health?  []  Yes     [x]  No     Needs a referral?       []  Yes     [x]  No

## 2020-06-03 ENCOUNTER — READMISSION MANAGEMENT (OUTPATIENT)
Dept: CALL CENTER | Facility: HOSPITAL | Age: 85
End: 2020-06-03

## 2020-06-03 NOTE — OUTREACH NOTE
CHF Week 4 Survey      Responses   [facility] patient discharged from?  [place]   Does the patient have one of the following disease processes/diagnoses(primary or secondary)?  CHF   Week 4 attempt successful?  Yes   Call start time  1501   Call end time  1506   Is the patient taking all medications as directed (includes completed medication regime)?  Yes   Has the patient kept scheduled appointments due by today?  N/A   Week 4 Call Completed?  Yes   Would the patient like one additional call?  Yes   Wrap up additional comments  Pt states she is feeling better. Her appetite is much better. APRN come to her regularly.          Susie Campo RN

## 2020-06-11 ENCOUNTER — READMISSION MANAGEMENT (OUTPATIENT)
Dept: CALL CENTER | Facility: HOSPITAL | Age: 85
End: 2020-06-11

## 2020-06-11 NOTE — OUTREACH NOTE
CHF Week 5 Survey      Responses   [facility] patient discharged from?  [place]   Does the patient have one of the following disease processes/diagnoses(primary or secondary)?  CHF   Week 5 attempt successful?  No          Mounika Merchant LPN

## (undated) DEVICE — GLV SURG SENSICARE W/ALOE PF LF 8 STRL

## (undated) DEVICE — ADHS SKIN DERMABOND TOP ADVANCED

## (undated) DEVICE — APPL CHLORAPREP W/TINT 26ML ORNG

## (undated) DEVICE — Device

## (undated) DEVICE — PREP SOL POVIDONE/IODINE BT 4OZ

## (undated) DEVICE — INTENDED FOR TISSUE SEPARATION, AND OTHER PROCEDURES THAT REQUIRE A SHARP SURGICAL BLADE TO PUNCTURE OR CUT.: Brand: BARD-PARKER ® CARBON RIB-BACK BLADES

## (undated) DEVICE — 2108 SERIES SAGITTAL BLADE (19.5 X 1.27 X 81.0MM)

## (undated) DEVICE — BNDG ELAS ELITE V/CLOSE 4IN 5YD LF STRL

## (undated) DEVICE — DRAPE,U/ SHT,SPLIT,PLAS,STERIL: Brand: MEDLINE

## (undated) DEVICE — MEDICINE CUP, GRADUATED, STER: Brand: MEDLINE

## (undated) DEVICE — ANTIBACTERIAL UNDYED BRAIDED (POLYGLACTIN 910), SYNTHETIC ABSORBABLE SUTURE: Brand: COATED VICRYL

## (undated) DEVICE — OPTIFOAM GENTLE SA, POSTOP, 4X12: Brand: MEDLINE

## (undated) DEVICE — SOL ISO/ALC RUB 70PCT 4OZ

## (undated) DEVICE — GLV SURG BIOGEL LTX PF 8

## (undated) DEVICE — SPNG LAP 18X18IN LF STRL PK/5

## (undated) DEVICE — PK KN TOTL 40

## (undated) DEVICE — ENCORE® LATEX ORTHO SIZE 8.5, STERILE LATEX POWDER-FREE SURGICAL GLOVE: Brand: ENCORE

## (undated) DEVICE — TRY SKINPREP DRYPREP

## (undated) DEVICE — 3M™ IOBAN™ 2 ANTIMICROBIAL INCISE DRAPE 6650EZ: Brand: IOBAN™ 2

## (undated) DEVICE — PIN DRL NOHEAD TROC 3.2X75MM BX/4

## (undated) DEVICE — SUT MNCRYL 3/0 PS2 18IN MCP497G

## (undated) DEVICE — SYR LUERLOK 50ML

## (undated) DEVICE — SCRW HEX PERSONA FML 2.5X25MM PK/2
Type: IMPLANTABLE DEVICE | Site: KNEE | Status: NON-FUNCTIONAL
Removed: 2019-05-15

## (undated) DEVICE — BNDG ELAS ELITE V/CLOSE 6IN 5YD LF STRL

## (undated) DEVICE — DUAL CUT SAGITTAL BLADE

## (undated) DEVICE — TOWEL,OR,DSP,ST,BLUE,STD,4/PK,20PK/CS: Brand: MEDLINE

## (undated) DEVICE — ENCORE® LATEX ORTHO SIZE 8, STERILE LATEX POWDER-FREE SURGICAL GLOVE: Brand: ENCORE

## (undated) DEVICE — DRSNG SURESITE123 4X10IN

## (undated) DEVICE — STRAP STIRUP SLP RNG 19X3.5IN DISP

## (undated) DEVICE — DRSNG TELFA PAD NONADH STR 1S 3X8IN

## (undated) DEVICE — GLV SURG BIOGEL LTX PF 8 1/2

## (undated) DEVICE — GLV SURG TRIUMPH CLASSIC PF LTX 8 STRL

## (undated) DEVICE — NDL SPINE 18G 31/2IN PNK

## (undated) DEVICE — STRAP STIRUP WO/ RNG